# Patient Record
Sex: MALE | Race: WHITE | NOT HISPANIC OR LATINO | Employment: OTHER | URBAN - METROPOLITAN AREA
[De-identification: names, ages, dates, MRNs, and addresses within clinical notes are randomized per-mention and may not be internally consistent; named-entity substitution may affect disease eponyms.]

---

## 2021-10-28 ENCOUNTER — OFFICE VISIT (OUTPATIENT)
Dept: DERMATOLOGY | Facility: CLINIC | Age: 66
End: 2021-10-28
Payer: MEDICARE

## 2021-10-28 DIAGNOSIS — L82.1 SEBORRHEIC KERATOSIS: ICD-10-CM

## 2021-10-28 DIAGNOSIS — L81.4 LENTIGO: ICD-10-CM

## 2021-10-28 DIAGNOSIS — D22.9 MULTIPLE BENIGN NEVI: Primary | ICD-10-CM

## 2021-10-28 PROCEDURE — 1101F PT FALLS ASSESS-DOCD LE1/YR: CPT | Mod: CPTII,S$GLB,, | Performed by: DERMATOLOGY

## 2021-10-28 PROCEDURE — 1159F MED LIST DOCD IN RCRD: CPT | Mod: CPTII,S$GLB,, | Performed by: DERMATOLOGY

## 2021-10-28 PROCEDURE — 3288F FALL RISK ASSESSMENT DOCD: CPT | Mod: CPTII,S$GLB,, | Performed by: DERMATOLOGY

## 2021-10-28 PROCEDURE — 4010F PR ACE/ARB THEARPY RXD/TAKEN: ICD-10-PCS | Mod: CPTII,S$GLB,, | Performed by: DERMATOLOGY

## 2021-10-28 PROCEDURE — 4010F ACE/ARB THERAPY RXD/TAKEN: CPT | Mod: CPTII,S$GLB,, | Performed by: DERMATOLOGY

## 2021-10-28 PROCEDURE — 1160F PR REVIEW ALL MEDS BY PRESCRIBER/CLIN PHARMACIST DOCUMENTED: ICD-10-PCS | Mod: CPTII,S$GLB,, | Performed by: DERMATOLOGY

## 2021-10-28 PROCEDURE — 1160F RVW MEDS BY RX/DR IN RCRD: CPT | Mod: CPTII,S$GLB,, | Performed by: DERMATOLOGY

## 2021-10-28 PROCEDURE — 99203 PR OFFICE/OUTPT VISIT, NEW, LEVL III, 30-44 MIN: ICD-10-PCS | Mod: S$GLB,,, | Performed by: DERMATOLOGY

## 2021-10-28 PROCEDURE — 1101F PR PT FALLS ASSESS DOC 0-1 FALLS W/OUT INJ PAST YR: ICD-10-PCS | Mod: CPTII,S$GLB,, | Performed by: DERMATOLOGY

## 2021-10-28 PROCEDURE — 99999 PR PBB SHADOW E&M-EST. PATIENT-LVL III: CPT | Mod: PBBFAC,,, | Performed by: DERMATOLOGY

## 2021-10-28 PROCEDURE — 99999 PR PBB SHADOW E&M-EST. PATIENT-LVL III: ICD-10-PCS | Mod: PBBFAC,,, | Performed by: DERMATOLOGY

## 2021-10-28 PROCEDURE — 1159F PR MEDICATION LIST DOCUMENTED IN MEDICAL RECORD: ICD-10-PCS | Mod: CPTII,S$GLB,, | Performed by: DERMATOLOGY

## 2021-10-28 PROCEDURE — 3288F PR FALLS RISK ASSESSMENT DOCUMENTED: ICD-10-PCS | Mod: CPTII,S$GLB,, | Performed by: DERMATOLOGY

## 2021-10-28 PROCEDURE — 99203 OFFICE O/P NEW LOW 30 MIN: CPT | Mod: S$GLB,,, | Performed by: DERMATOLOGY

## 2021-10-28 RX ORDER — ISOSORBIDE MONONITRATE 30 MG/1
30 TABLET, EXTENDED RELEASE ORAL DAILY
Status: ON HOLD | COMMUNITY
Start: 2021-07-28 | End: 2023-11-07 | Stop reason: HOSPADM

## 2021-10-28 RX ORDER — TESTOSTERONE CYPIONATE 200 MG/ML
200 INJECTION, SOLUTION INTRAMUSCULAR
COMMUNITY
Start: 2021-10-07

## 2021-10-28 RX ORDER — ROSUVASTATIN CALCIUM 20 MG/1
20 TABLET, COATED ORAL DAILY
Status: ON HOLD | COMMUNITY
Start: 2021-10-07 | End: 2023-11-07 | Stop reason: HOSPADM

## 2021-10-28 RX ORDER — METOPROLOL SUCCINATE 25 MG/1
25 TABLET, EXTENDED RELEASE ORAL DAILY
COMMUNITY
Start: 2021-09-22

## 2021-10-28 RX ORDER — LISINOPRIL 40 MG/1
40 TABLET ORAL DAILY
Status: ON HOLD | COMMUNITY
Start: 2021-08-17 | End: 2023-11-07 | Stop reason: HOSPADM

## 2021-10-28 RX ORDER — HYDROCHLOROTHIAZIDE 25 MG/1
25 TABLET ORAL DAILY
Status: ON HOLD | COMMUNITY
Start: 2021-09-07 | End: 2023-11-07 | Stop reason: HOSPADM

## 2021-10-28 RX ORDER — IBUPROFEN 800 MG/1
800 TABLET ORAL 3 TIMES DAILY PRN
COMMUNITY
Start: 2021-06-15

## 2021-10-28 RX ORDER — OXYCODONE AND ACETAMINOPHEN 10; 325 MG/1; MG/1
1 TABLET ORAL EVERY 6 HOURS PRN
COMMUNITY
Start: 2021-10-04

## 2022-06-03 ENCOUNTER — OFFICE VISIT (OUTPATIENT)
Dept: UROLOGY | Facility: CLINIC | Age: 67
End: 2022-06-03
Payer: MEDICARE

## 2022-06-03 VITALS
WEIGHT: 220.88 LBS | TEMPERATURE: 99 F | DIASTOLIC BLOOD PRESSURE: 79 MMHG | RESPIRATION RATE: 18 BRPM | HEIGHT: 75 IN | BODY MASS INDEX: 27.46 KG/M2 | HEART RATE: 66 BPM | SYSTOLIC BLOOD PRESSURE: 152 MMHG

## 2022-06-03 DIAGNOSIS — R35.1 BENIGN PROSTATIC HYPERPLASIA WITH NOCTURIA: Primary | ICD-10-CM

## 2022-06-03 DIAGNOSIS — N40.1 BENIGN PROSTATIC HYPERPLASIA WITH NOCTURIA: Primary | ICD-10-CM

## 2022-06-03 DIAGNOSIS — Z80.42 FAMILY HISTORY OF PROSTATE CANCER IN FATHER: ICD-10-CM

## 2022-06-03 PROCEDURE — 3077F SYST BP >= 140 MM HG: CPT | Mod: CPTII,S$GLB,, | Performed by: UROLOGY

## 2022-06-03 PROCEDURE — 3078F PR MOST RECENT DIASTOLIC BLOOD PRESSURE < 80 MM HG: ICD-10-PCS | Mod: CPTII,S$GLB,, | Performed by: UROLOGY

## 2022-06-03 PROCEDURE — 1159F PR MEDICATION LIST DOCUMENTED IN MEDICAL RECORD: ICD-10-PCS | Mod: CPTII,S$GLB,, | Performed by: UROLOGY

## 2022-06-03 PROCEDURE — 99999 PR PBB SHADOW E&M-EST. PATIENT-LVL IV: ICD-10-PCS | Mod: PBBFAC,,, | Performed by: UROLOGY

## 2022-06-03 PROCEDURE — 1101F PR PT FALLS ASSESS DOC 0-1 FALLS W/OUT INJ PAST YR: ICD-10-PCS | Mod: CPTII,S$GLB,, | Performed by: UROLOGY

## 2022-06-03 PROCEDURE — 1126F AMNT PAIN NOTED NONE PRSNT: CPT | Mod: CPTII,S$GLB,, | Performed by: UROLOGY

## 2022-06-03 PROCEDURE — 3288F FALL RISK ASSESSMENT DOCD: CPT | Mod: CPTII,S$GLB,, | Performed by: UROLOGY

## 2022-06-03 PROCEDURE — 99999 PR PBB SHADOW E&M-EST. PATIENT-LVL IV: CPT | Mod: PBBFAC,,, | Performed by: UROLOGY

## 2022-06-03 PROCEDURE — 99203 OFFICE O/P NEW LOW 30 MIN: CPT | Mod: S$GLB,,, | Performed by: UROLOGY

## 2022-06-03 PROCEDURE — 4010F PR ACE/ARB THEARPY RXD/TAKEN: ICD-10-PCS | Mod: CPTII,S$GLB,, | Performed by: UROLOGY

## 2022-06-03 PROCEDURE — 3008F PR BODY MASS INDEX (BMI) DOCUMENTED: ICD-10-PCS | Mod: CPTII,S$GLB,, | Performed by: UROLOGY

## 2022-06-03 PROCEDURE — 4010F ACE/ARB THERAPY RXD/TAKEN: CPT | Mod: CPTII,S$GLB,, | Performed by: UROLOGY

## 2022-06-03 PROCEDURE — 1101F PT FALLS ASSESS-DOCD LE1/YR: CPT | Mod: CPTII,S$GLB,, | Performed by: UROLOGY

## 2022-06-03 PROCEDURE — 1159F MED LIST DOCD IN RCRD: CPT | Mod: CPTII,S$GLB,, | Performed by: UROLOGY

## 2022-06-03 PROCEDURE — 3008F BODY MASS INDEX DOCD: CPT | Mod: CPTII,S$GLB,, | Performed by: UROLOGY

## 2022-06-03 PROCEDURE — 3078F DIAST BP <80 MM HG: CPT | Mod: CPTII,S$GLB,, | Performed by: UROLOGY

## 2022-06-03 PROCEDURE — 3288F PR FALLS RISK ASSESSMENT DOCUMENTED: ICD-10-PCS | Mod: CPTII,S$GLB,, | Performed by: UROLOGY

## 2022-06-03 PROCEDURE — 99203 PR OFFICE/OUTPT VISIT, NEW, LEVL III, 30-44 MIN: ICD-10-PCS | Mod: S$GLB,,, | Performed by: UROLOGY

## 2022-06-03 PROCEDURE — 1126F PR PAIN SEVERITY QUANTIFIED, NO PAIN PRESENT: ICD-10-PCS | Mod: CPTII,S$GLB,, | Performed by: UROLOGY

## 2022-06-03 PROCEDURE — 3077F PR MOST RECENT SYSTOLIC BLOOD PRESSURE >= 140 MM HG: ICD-10-PCS | Mod: CPTII,S$GLB,, | Performed by: UROLOGY

## 2022-06-03 NOTE — PROGRESS NOTES
Chief Complaint:   Encounter Diagnoses   Name Primary?    Benign prostatic hyperplasia with nocturia Yes    Family history of prostate cancer in father        HPI:  67-year-old gentleman who would like to establish with Urology.  Significant family history of prostate cancer in his father who  in his mid 60s.  Thus far his PSAs have been stable, he takes 200 mg of testosterone every week by his PCP.  He states that over the last few months he started getting up about 3-5 times per evening with worsening symptoms, started over-the-counter prostate medication states that things are much better.  He is only getting up 2-3 times per evening, a better stream.  No problems with frequency urgency now, no gross hematuria, he has a remote history of smoking, but only for about 10 years.  No other urological history, no family history of urological cancers or stones except for that stated above.  Upon further questioning patient states that he did pass his stone years ago, no recurrence.  Tried tamsulosin once before with no benefit, at this point he is okay with his current lower urinary tract symptoms.    Allergies:  Patient has no known allergies.    Medications:  has a current medication list which includes the following prescription(s): hydrochlorothiazide, ibuprofen, isosorbide mononitrate, lisinopril, metoprolol succinate, oxycodone-acetaminophen, rosuvastatin, and testosterone cypionate.    Review of Systems:  General: No fever, chills, fatigability, or weight loss.  Skin: No rashes, itching, or changes in color or texture of skin.  Chest: Denies FERNANDEZ, cyanosis, wheezing, cough, and sputum production.  Abdomen: Appetite fine. No weight loss. Denies diarrhea, abdominal pain, hematemesis, or blood in stool.  Musculoskeletal: No joint stiffness or swelling. Denies back pain.  : As above.  All other review of systems negative.    PMH:   has a past medical history of Kidney stone.    PSH:   has no past surgical  history on file.    FamHx: family history includes Prostate cancer in his father.    SocHx:  reports that he has never smoked. He has never used smokeless tobacco. He reports that he does not drink alcohol and does not use drugs.      Physical Exam:  Vitals:    06/03/22 1457   BP: (!) 152/79   Pulse: 66   Resp: 18   Temp: 99.1 °F (37.3 °C)     General: A&Ox3, no apparent distress, no deformities  Neck: No masses, normal ROM  Lungs: normal inspiration, no use of accessory muscles  Heart: normal pulse, no arrhythmias  Abdomen: Soft, NT, ND, no masses, no hernias, no hepatosplenomegaly  Skin: The skin is warm and dry. No jaundice.  Ext: No c/c/e.  ESTRELLITA: 6/22- Normal rectal tone. Prost 25 gm no nodules or masses appreciated. SV not palpable. Perineum and anus normal.    Labs/Studies:   PSA 0.69 3/22    Impression/Plan:     1. BPH- currently doing well with over-the-counter supplements and would continue, call if this becomes worse.  Significant family history of prostate cancer within his father, ESTRELLITA and PSA are stable.  I will see him in 1 year with a PSA, unless he needs to be seen sooner.    2. Family history of prostate cancer within his father- please see above.

## 2022-09-14 ENCOUNTER — PATIENT MESSAGE (OUTPATIENT)
Dept: UROLOGY | Facility: CLINIC | Age: 67
End: 2022-09-14
Payer: MEDICARE

## 2022-11-11 ENCOUNTER — PATIENT MESSAGE (OUTPATIENT)
Dept: UROLOGY | Facility: CLINIC | Age: 67
End: 2022-11-11
Payer: MEDICARE

## 2022-12-16 ENCOUNTER — PATIENT MESSAGE (OUTPATIENT)
Dept: RESEARCH | Facility: HOSPITAL | Age: 67
End: 2022-12-16
Payer: MEDICARE

## 2023-05-05 ENCOUNTER — TELEPHONE (OUTPATIENT)
Dept: SURGERY | Facility: CLINIC | Age: 68
End: 2023-05-05
Payer: MEDICARE

## 2023-05-05 NOTE — TELEPHONE ENCOUNTER
Lm on vm letting know pt know we do not have a referral and his ins company might need that. Asked pt to call and gave good call back number

## 2023-06-02 ENCOUNTER — OFFICE VISIT (OUTPATIENT)
Dept: UROLOGY | Facility: CLINIC | Age: 68
End: 2023-06-02
Payer: MEDICARE

## 2023-06-02 VITALS
DIASTOLIC BLOOD PRESSURE: 73 MMHG | SYSTOLIC BLOOD PRESSURE: 125 MMHG | HEART RATE: 74 BPM | BODY MASS INDEX: 26.13 KG/M2 | HEIGHT: 75 IN | WEIGHT: 210.13 LBS

## 2023-06-02 DIAGNOSIS — Z80.42 FAMILY HISTORY OF PROSTATE CANCER IN FATHER: ICD-10-CM

## 2023-06-02 DIAGNOSIS — R35.1 BENIGN PROSTATIC HYPERPLASIA WITH NOCTURIA: Primary | ICD-10-CM

## 2023-06-02 DIAGNOSIS — N40.1 BENIGN PROSTATIC HYPERPLASIA WITH NOCTURIA: Primary | ICD-10-CM

## 2023-06-02 PROCEDURE — 3078F DIAST BP <80 MM HG: CPT | Mod: CPTII,S$GLB,, | Performed by: UROLOGY

## 2023-06-02 PROCEDURE — 3074F PR MOST RECENT SYSTOLIC BLOOD PRESSURE < 130 MM HG: ICD-10-PCS | Mod: CPTII,S$GLB,, | Performed by: UROLOGY

## 2023-06-02 PROCEDURE — 1126F AMNT PAIN NOTED NONE PRSNT: CPT | Mod: CPTII,S$GLB,, | Performed by: UROLOGY

## 2023-06-02 PROCEDURE — 99999 PR PBB SHADOW E&M-EST. PATIENT-LVL III: CPT | Mod: PBBFAC,,, | Performed by: UROLOGY

## 2023-06-02 PROCEDURE — 3074F SYST BP LT 130 MM HG: CPT | Mod: CPTII,S$GLB,, | Performed by: UROLOGY

## 2023-06-02 PROCEDURE — 99213 OFFICE O/P EST LOW 20 MIN: CPT | Mod: S$GLB,,, | Performed by: UROLOGY

## 2023-06-02 PROCEDURE — 3008F PR BODY MASS INDEX (BMI) DOCUMENTED: ICD-10-PCS | Mod: CPTII,S$GLB,, | Performed by: UROLOGY

## 2023-06-02 PROCEDURE — 1159F MED LIST DOCD IN RCRD: CPT | Mod: CPTII,S$GLB,, | Performed by: UROLOGY

## 2023-06-02 PROCEDURE — 1126F PR PAIN SEVERITY QUANTIFIED, NO PAIN PRESENT: ICD-10-PCS | Mod: CPTII,S$GLB,, | Performed by: UROLOGY

## 2023-06-02 PROCEDURE — 4010F ACE/ARB THERAPY RXD/TAKEN: CPT | Mod: CPTII,S$GLB,, | Performed by: UROLOGY

## 2023-06-02 PROCEDURE — 1101F PT FALLS ASSESS-DOCD LE1/YR: CPT | Mod: CPTII,S$GLB,, | Performed by: UROLOGY

## 2023-06-02 PROCEDURE — 1101F PR PT FALLS ASSESS DOC 0-1 FALLS W/OUT INJ PAST YR: ICD-10-PCS | Mod: CPTII,S$GLB,, | Performed by: UROLOGY

## 2023-06-02 PROCEDURE — 3288F FALL RISK ASSESSMENT DOCD: CPT | Mod: CPTII,S$GLB,, | Performed by: UROLOGY

## 2023-06-02 PROCEDURE — 99999 PR PBB SHADOW E&M-EST. PATIENT-LVL III: ICD-10-PCS | Mod: PBBFAC,,, | Performed by: UROLOGY

## 2023-06-02 PROCEDURE — 3008F BODY MASS INDEX DOCD: CPT | Mod: CPTII,S$GLB,, | Performed by: UROLOGY

## 2023-06-02 PROCEDURE — 99213 PR OFFICE/OUTPT VISIT, EST, LEVL III, 20-29 MIN: ICD-10-PCS | Mod: S$GLB,,, | Performed by: UROLOGY

## 2023-06-02 PROCEDURE — 1159F PR MEDICATION LIST DOCUMENTED IN MEDICAL RECORD: ICD-10-PCS | Mod: CPTII,S$GLB,, | Performed by: UROLOGY

## 2023-06-02 PROCEDURE — 3078F PR MOST RECENT DIASTOLIC BLOOD PRESSURE < 80 MM HG: ICD-10-PCS | Mod: CPTII,S$GLB,, | Performed by: UROLOGY

## 2023-06-02 PROCEDURE — 4010F PR ACE/ARB THEARPY RXD/TAKEN: ICD-10-PCS | Mod: CPTII,S$GLB,, | Performed by: UROLOGY

## 2023-06-02 PROCEDURE — 3288F PR FALLS RISK ASSESSMENT DOCUMENTED: ICD-10-PCS | Mod: CPTII,S$GLB,, | Performed by: UROLOGY

## 2023-06-02 NOTE — PROGRESS NOTES
Chief Complaint:   Encounter Diagnoses   Name Primary?    Benign prostatic hyperplasia with nocturia Yes    Family history of prostate cancer in father        HPI:    23- here for yearly assessment, PSA is stable and voiding well on over-the-counter medications.    67-year-old gentleman who would like to establish with Urology.  Significant family history of prostate cancer in his father who  in his mid 60s.  Thus far his PSAs have been stable, he takes 200 mg of testosterone every week by his PCP.  He states that over the last few months he started getting up about 3-5 times per evening with worsening symptoms, started over-the-counter prostate medication states that things are much better.  He is only getting up 2-3 times per evening, a better stream.  No problems with frequency urgency now, no gross hematuria, he has a remote history of smoking, but only for about 10 years.  No other urological history, no family history of urological cancers or stones except for that stated above.  Upon further questioning patient states that he did pass his stone years ago, no recurrence.  Tried tamsulosin once before with no benefit, at this point he is okay with his current lower urinary tract symptoms.    Allergies:  Patient has no known allergies.    Medications:  has a current medication list which includes the following prescription(s): hydrochlorothiazide, ibuprofen, isosorbide mononitrate, lisinopril, metoprolol succinate, oxycodone-acetaminophen, rosuvastatin, and testosterone cypionate.    Review of Systems:  General: No fever, chills, fatigability, or weight loss.  Skin: No rashes, itching, or changes in color or texture of skin.  Chest: Denies FERNANDEZ, cyanosis, wheezing, cough, and sputum production.  Abdomen: Appetite fine. No weight loss. Denies diarrhea, abdominal pain, hematemesis, or blood in stool.  Musculoskeletal: No joint stiffness or swelling. Denies back pain.  : As above.  All other review of  systems negative.    PMH:   has a past medical history of Kidney stone.    PSH:   has no past surgical history on file.    FamHx: family history includes Prostate cancer in his father.    SocHx:  reports that he has never smoked. He has never used smokeless tobacco. He reports that he does not drink alcohol and does not use drugs.      Physical Exam:  There were no vitals filed for this visit.    General: A&Ox3, no apparent distress, no deformities  Neck: No masses, normal ROM  Lungs: normal inspiration, no use of accessory muscles  Heart: normal pulse, no arrhythmias  Abdomen: Soft, NT, ND, no masses, no hernias, no hepatosplenomegaly  Skin: The skin is warm and dry. No jaundice.  Ext: No c/c/e.  ESTRELLITA: 6/22- Normal rectal tone. Prost 25 gm no nodules or masses appreciated. SV not palpable. Perineum and anus normal.    Labs/Studies:   PSA 0.74 4/23    Impression/Plan:       1. BPH- currently doing well with over-the-counter medications, no further recommendations.    2. Family history of prostate cancer within his father- PSA appears to be stable, continue yearly.  Call with any complaints in the meantime.

## 2023-10-28 ENCOUNTER — HOSPITAL ENCOUNTER (INPATIENT)
Facility: HOSPITAL | Age: 68
LOS: 10 days | Discharge: HOME HEALTH | DRG: 215 | End: 2023-11-07
Attending: EMERGENCY MEDICINE | Admitting: INTERNAL MEDICINE
Payer: MEDICARE

## 2023-10-28 DIAGNOSIS — Z95.811 LVAD (LEFT VENTRICULAR ASSIST DEVICE) PRESENT: ICD-10-CM

## 2023-10-28 DIAGNOSIS — Z95.1 S/P CABG X 3: Primary | ICD-10-CM

## 2023-10-28 DIAGNOSIS — R07.9 CHEST PAIN AT REST: ICD-10-CM

## 2023-10-28 DIAGNOSIS — I49.01 VENTRICULAR FIBRILLATION: ICD-10-CM

## 2023-10-28 DIAGNOSIS — I21.3 STEMI (ST ELEVATION MYOCARDIAL INFARCTION): ICD-10-CM

## 2023-10-28 DIAGNOSIS — I46.9 CARDIAC ARREST: ICD-10-CM

## 2023-10-28 DIAGNOSIS — Z98.890 POST-OPERATIVE STATE: ICD-10-CM

## 2023-10-28 DIAGNOSIS — I25.110 CORONARY ARTERY DISEASE INVOLVING NATIVE CORONARY ARTERY OF NATIVE HEART WITH UNSTABLE ANGINA PECTORIS: ICD-10-CM

## 2023-10-28 DIAGNOSIS — I25.10 CAD (CORONARY ARTERY DISEASE): ICD-10-CM

## 2023-10-28 DIAGNOSIS — I47.20 VT (VENTRICULAR TACHYCARDIA): ICD-10-CM

## 2023-10-28 DIAGNOSIS — I25.10 LEFT MAIN CORONARY ARTERY DISEASE: ICD-10-CM

## 2023-10-28 PROBLEM — J96.02 ACUTE HYPERCAPNIC RESPIRATORY FAILURE: Status: ACTIVE | Noted: 2023-10-28

## 2023-10-28 PROBLEM — E78.5 HYPERLIPIDEMIA: Status: ACTIVE | Noted: 2023-10-28

## 2023-10-28 PROBLEM — I10 PRIMARY HYPERTENSION: Status: ACTIVE | Noted: 2023-10-28

## 2023-10-28 PROBLEM — N17.9 AKI (ACUTE KIDNEY INJURY): Status: ACTIVE | Noted: 2023-10-28

## 2023-10-28 PROBLEM — E29.1 HYPOGONADISM IN MALE: Status: ACTIVE | Noted: 2023-10-28

## 2023-10-28 PROBLEM — K51.20 ULCERATIVE PROCTITIS: Status: ACTIVE | Noted: 2023-10-28

## 2023-10-28 LAB
ALBUMIN SERPL BCP-MCNC: 4 G/DL (ref 3.5–5.2)
ALLENS TEST: ABNORMAL
ALLENS TEST: ABNORMAL
ALP SERPL-CCNC: 56 U/L (ref 55–135)
ALT SERPL W/O P-5'-P-CCNC: 215 U/L (ref 10–44)
ANION GAP SERPL CALC-SCNC: 12 MMOL/L (ref 8–16)
ANION GAP SERPL CALC-SCNC: 27 MMOL/L (ref 8–16)
AORTIC ROOT ANNULUS: 3.05 CM
APTT PPP: 23 SEC (ref 21–32)
APTT PPP: 24.5 SEC (ref 21–32)
ASCENDING AORTA: 2.86 CM
AST SERPL-CCNC: 113 U/L (ref 10–40)
AV INDEX (PROSTH): 0.79
AV MEAN GRADIENT: 3 MMHG
AV PEAK GRADIENT: 4 MMHG
AV VALVE AREA BY VELOCITY RATIO: 2.15 CM²
AV VALVE AREA: 2.34 CM²
AV VELOCITY RATIO: 0.73
BACTERIA #/AREA URNS HPF: ABNORMAL /HPF
BASOPHILS # BLD AUTO: 0.1 K/UL (ref 0–0.2)
BASOPHILS NFR BLD: 0.8 % (ref 0–1.9)
BILIRUB SERPL-MCNC: 0.5 MG/DL (ref 0.1–1)
BILIRUB UR QL STRIP: NEGATIVE
BSA FOR ECHO PROCEDURE: 2.43 M2
BUN SERPL-MCNC: 27 MG/DL (ref 8–23)
BUN SERPL-MCNC: 30 MG/DL (ref 8–23)
CALCIUM SERPL-MCNC: 7.5 MG/DL (ref 8.7–10.5)
CALCIUM SERPL-MCNC: 8.3 MG/DL (ref 8.7–10.5)
CHLORIDE SERPL-SCNC: 102 MMOL/L (ref 95–110)
CHLORIDE SERPL-SCNC: 105 MMOL/L (ref 95–110)
CK SERPL-CCNC: 2422 U/L (ref 20–200)
CK SERPL-CCNC: 394 U/L (ref 20–200)
CLARITY UR: CLEAR
CO2 SERPL-SCNC: 11 MMOL/L (ref 23–29)
CO2 SERPL-SCNC: 21 MMOL/L (ref 23–29)
COLOR UR: YELLOW
CREAT SERPL-MCNC: 1.5 MG/DL (ref 0.5–1.4)
CREAT SERPL-MCNC: 1.9 MG/DL (ref 0.5–1.4)
CV ECHO LV RWT: 0.42 CM
DELSYS: ABNORMAL
DELSYS: ABNORMAL
DIFFERENTIAL METHOD: ABNORMAL
DOP CALC AO PEAK VEL: 1.06 M/S
DOP CALC AO VTI: 20.6 CM
DOP CALC LVOT AREA: 3 CM2
DOP CALC LVOT DIAMETER: 1.94 CM
DOP CALC LVOT PEAK VEL: 0.77 M/S
DOP CALC LVOT STROKE VOLUME: 48.16 CM3
DOP CALC RVOT PEAK VEL: 0.66 M/S
DOP CALC RVOT VTI: 13.7 CM
DOP CALCLVOT PEAK VEL VTI: 16.3 CM
E WAVE DECELERATION TIME: 189.74 MSEC
E/A RATIO: 2.05
E/E' RATIO: 8.8 M/S
ECHO LV POSTERIOR WALL: 1.13 CM (ref 0.6–1.1)
EOSINOPHIL # BLD AUTO: 0.2 K/UL (ref 0–0.5)
EOSINOPHIL NFR BLD: 1.3 % (ref 0–8)
ERYTHROCYTE [DISTWIDTH] IN BLOOD BY AUTOMATED COUNT: 13.3 % (ref 11.5–14.5)
ERYTHROCYTE [SEDIMENTATION RATE] IN BLOOD BY WESTERGREN METHOD: 18 MM/H
ERYTHROCYTE [SEDIMENTATION RATE] IN BLOOD BY WESTERGREN METHOD: 20 MM/H
EST. GFR  (NO RACE VARIABLE): 38 ML/MIN/1.73 M^2
EST. GFR  (NO RACE VARIABLE): 51 ML/MIN/1.73 M^2
FIBRINOGEN PPP-MCNC: 179 MG/DL (ref 182–400)
FIO2: 100
FIO2: 40
FRACTIONAL SHORTENING: 29 % (ref 28–44)
GLUCOSE SERPL-MCNC: 144 MG/DL (ref 70–110)
GLUCOSE SERPL-MCNC: 145 MG/DL (ref 70–110)
GLUCOSE SERPL-MCNC: 148 MG/DL (ref 70–110)
GLUCOSE SERPL-MCNC: 149 MG/DL (ref 70–110)
GLUCOSE SERPL-MCNC: 198 MG/DL (ref 70–110)
GLUCOSE UR QL STRIP: NEGATIVE
HCO3 UR-SCNC: 20 MMOL/L (ref 24–28)
HCO3 UR-SCNC: 21.7 MMOL/L (ref 24–28)
HCT VFR BLD AUTO: 53.3 % (ref 40–54)
HGB BLD-MCNC: 16.9 G/DL (ref 14–18)
HGB UR QL STRIP: ABNORMAL
IMM GRANULOCYTES # BLD AUTO: 0.7 K/UL (ref 0–0.04)
IMM GRANULOCYTES NFR BLD AUTO: 5.6 % (ref 0–0.5)
INR PPP: 1.3 (ref 0.8–1.2)
INTERVENTRICULAR SEPTUM: 1.09 CM (ref 0.6–1.1)
IVC DIAMETER: 2.68 CM
IVRT: 64.7 MSEC
KETONES UR QL STRIP: NEGATIVE
LA MAJOR: 6.14 CM
LA MINOR: 6.05 CM
LA WIDTH: 4.5 CM
LACTATE SERPL-SCNC: 1.8 MMOL/L (ref 0.5–2.2)
LEFT ATRIUM SIZE: 4.3 CM
LEFT ATRIUM VOLUME INDEX: 41.8 ML/M2
LEFT ATRIUM VOLUME: 100.24 CM3
LEFT INTERNAL DIMENSION IN SYSTOLE: 3.81 CM (ref 2.1–4)
LEFT VENTRICLE DIASTOLIC VOLUME INDEX: 57.63 ML/M2
LEFT VENTRICLE DIASTOLIC VOLUME: 138.32 ML
LEFT VENTRICLE MASS INDEX: 98 G/M2
LEFT VENTRICLE SYSTOLIC VOLUME INDEX: 26 ML/M2
LEFT VENTRICLE SYSTOLIC VOLUME: 62.38 ML
LEFT VENTRICULAR INTERNAL DIMENSION IN DIASTOLE: 5.35 CM (ref 3.5–6)
LEFT VENTRICULAR MASS: 234.12 G
LEUKOCYTE ESTERASE UR QL STRIP: NEGATIVE
LV LATERAL E/E' RATIO: 9.78 M/S
LV SEPTAL E/E' RATIO: 8 M/S
LVOT MG: 1.52 MMHG
LVOT MV: 0.6 CM/S
LYMPHOCYTES # BLD AUTO: 3.9 K/UL (ref 1–4.8)
LYMPHOCYTES NFR BLD: 31 % (ref 18–48)
MAGNESIUM SERPL-MCNC: 2 MG/DL (ref 1.6–2.6)
MCH RBC QN AUTO: 29.8 PG (ref 27–31)
MCHC RBC AUTO-ENTMCNC: 31.7 G/DL (ref 32–36)
MCV RBC AUTO: 94 FL (ref 82–98)
MICROSCOPIC COMMENT: ABNORMAL
MODE: ABNORMAL
MODE: ABNORMAL
MONOCYTES # BLD AUTO: 0.8 K/UL (ref 0.3–1)
MONOCYTES NFR BLD: 6.3 % (ref 4–15)
MV PEAK A VEL: 0.43 M/S
MV PEAK E VEL: 0.88 M/S
MV STENOSIS PRESSURE HALF TIME: 55.02 MS
MV VALVE AREA P 1/2 METHOD: 4 CM2
NEUTROPHILS # BLD AUTO: 6.9 K/UL (ref 1.8–7.7)
NEUTROPHILS NFR BLD: 55 % (ref 38–73)
NITRITE UR QL STRIP: NEGATIVE
NRBC BLD-RTO: 0 /100 WBC
PCO2 BLDA: 45.7 MMHG (ref 35–45)
PCO2 BLDA: 57.2 MMHG (ref 35–45)
PEEP: 5
PEEP: 5
PH SMN: 7.19 [PH] (ref 7.35–7.45)
PH SMN: 7.25 [PH] (ref 7.35–7.45)
PH UR STRIP: 7 [PH] (ref 5–8)
PHOSPHATE SERPL-MCNC: 3.7 MG/DL (ref 2.7–4.5)
PISA MRMAX VEL: 4.82 M/S
PISA TR MAX VEL: 2.89 M/S
PLATELET # BLD AUTO: 250 K/UL (ref 150–450)
PMV BLD AUTO: 10.3 FL (ref 9.2–12.9)
PO2 BLDA: 413 MMHG (ref 80–100)
PO2 BLDA: 77 MMHG (ref 80–100)
POC BE: -6 MMOL/L
POC BE: -7 MMOL/L
POC SATURATED O2: 100 % (ref 95–100)
POC SATURATED O2: 91 % (ref 95–100)
POTASSIUM SERPL-SCNC: 4.1 MMOL/L (ref 3.5–5.1)
POTASSIUM SERPL-SCNC: 5.1 MMOL/L (ref 3.5–5.1)
PROT SERPL-MCNC: 6.8 G/DL (ref 6–8.4)
PROT UR QL STRIP: ABNORMAL
PROTHROMBIN TIME: 14 SEC (ref 9–12.5)
PV MEAN GRADIENT: 1 MMHG
RA MAJOR: 5.53 CM
RA PRESSURE ESTIMATED: 3 MMHG
RA WIDTH: 4 CM
RBC # BLD AUTO: 5.68 M/UL (ref 4.6–6.2)
RBC #/AREA URNS HPF: 6 /HPF (ref 0–4)
RIGHT VENTRICULAR END-DIASTOLIC DIMENSION: 2.69 CM
RV TB RVSP: 6 MMHG
SAMPLE: ABNORMAL
SAMPLE: ABNORMAL
SITE: ABNORMAL
SITE: ABNORMAL
SODIUM SERPL-SCNC: 138 MMOL/L (ref 136–145)
SODIUM SERPL-SCNC: 140 MMOL/L (ref 136–145)
SP GR UR STRIP: 1.02 (ref 1–1.03)
SP02: 100
STJ: 2.96 CM
TDI LATERAL: 0.09 M/S
TDI SEPTAL: 0.11 M/S
TDI: 0.1 M/S
TR MAX PG: 33 MMHG
TR MEAN GRADIENT: 29 MMHG
TRICUSPID ANNULAR PLANE SYSTOLIC EXCURSION: 2.09 CM
TROPONIN I SERPL DL<=0.01 NG/ML-MCNC: 0.02 NG/ML (ref 0–0.03)
TROPONIN I SERPL DL<=0.01 NG/ML-MCNC: 2.21 NG/ML (ref 0–0.03)
TROPONIN I SERPL DL<=0.01 NG/ML-MCNC: 6.19 NG/ML (ref 0–0.03)
TV REST PULMONARY ARTERY PRESSURE: 36 MMHG
UNIDENT CRYS URNS QL MICRO: ABNORMAL
URN SPEC COLLECT METH UR: ABNORMAL
UROBILINOGEN UR STRIP-ACNC: NEGATIVE EU/DL
VT: 450
VT: 500
WBC # BLD AUTO: 12.6 K/UL (ref 3.9–12.7)
WBC #/AREA URNS HPF: 3 /HPF (ref 0–5)
Z-SCORE OF LEFT VENTRICULAR DIMENSION IN END DIASTOLE: -7.15
Z-SCORE OF LEFT VENTRICULAR DIMENSION IN END SYSTOLE: -4.28

## 2023-10-28 PROCEDURE — 63600175 PHARM REV CODE 636 W HCPCS

## 2023-10-28 PROCEDURE — 63600175 PHARM REV CODE 636 W HCPCS: Performed by: NURSE PRACTITIONER

## 2023-10-28 PROCEDURE — 87040 BLOOD CULTURE FOR BACTERIA: CPT | Performed by: EMERGENCY MEDICINE

## 2023-10-28 PROCEDURE — 82803 BLOOD GASES ANY COMBINATION: CPT

## 2023-10-28 PROCEDURE — 99900026 HC AIRWAY MAINTENANCE (STAT)

## 2023-10-28 PROCEDURE — 96361 HYDRATE IV INFUSION ADD-ON: CPT

## 2023-10-28 PROCEDURE — 36415 COLL VENOUS BLD VENIPUNCTURE: CPT | Performed by: INTERNAL MEDICINE

## 2023-10-28 PROCEDURE — 99291 CRITICAL CARE FIRST HOUR: CPT

## 2023-10-28 PROCEDURE — 84484 ASSAY OF TROPONIN QUANT: CPT | Mod: 91 | Performed by: INTERNAL MEDICINE

## 2023-10-28 PROCEDURE — 93005 ELECTROCARDIOGRAM TRACING: CPT

## 2023-10-28 PROCEDURE — 25000003 PHARM REV CODE 250: Performed by: INTERNAL MEDICINE

## 2023-10-28 PROCEDURE — 94002 VENT MGMT INPAT INIT DAY: CPT

## 2023-10-28 PROCEDURE — 82550 ASSAY OF CK (CPK): CPT | Mod: 91 | Performed by: EMERGENCY MEDICINE

## 2023-10-28 PROCEDURE — 85384 FIBRINOGEN ACTIVITY: CPT | Performed by: EMERGENCY MEDICINE

## 2023-10-28 PROCEDURE — 82550 ASSAY OF CK (CPK): CPT | Performed by: INTERNAL MEDICINE

## 2023-10-28 PROCEDURE — 27201640 HC PAD, ARTICGEL

## 2023-10-28 PROCEDURE — 85610 PROTHROMBIN TIME: CPT | Performed by: EMERGENCY MEDICINE

## 2023-10-28 PROCEDURE — 20000000 HC ICU ROOM

## 2023-10-28 PROCEDURE — 95822 EEG COMA OR SLEEP ONLY: CPT

## 2023-10-28 PROCEDURE — 99900035 HC TECH TIME PER 15 MIN (STAT)

## 2023-10-28 PROCEDURE — 99223 1ST HOSP IP/OBS HIGH 75: CPT | Mod: 25,,, | Performed by: INTERNAL MEDICINE

## 2023-10-28 PROCEDURE — 99223 PR INITIAL HOSPITAL CARE,LEVL III: ICD-10-PCS | Mod: 25,,, | Performed by: INTERNAL MEDICINE

## 2023-10-28 PROCEDURE — 93010 ELECTROCARDIOGRAM REPORT: CPT | Mod: ,,, | Performed by: INTERNAL MEDICINE

## 2023-10-28 PROCEDURE — 63600175 PHARM REV CODE 636 W HCPCS: Performed by: EMERGENCY MEDICINE

## 2023-10-28 PROCEDURE — 80053 COMPREHEN METABOLIC PANEL: CPT | Performed by: EMERGENCY MEDICINE

## 2023-10-28 PROCEDURE — 83605 ASSAY OF LACTIC ACID: CPT | Performed by: INTERNAL MEDICINE

## 2023-10-28 PROCEDURE — 85730 THROMBOPLASTIN TIME PARTIAL: CPT | Mod: 91 | Performed by: EMERGENCY MEDICINE

## 2023-10-28 PROCEDURE — 81000 URINALYSIS NONAUTO W/SCOPE: CPT | Performed by: EMERGENCY MEDICINE

## 2023-10-28 PROCEDURE — 51702 INSERT TEMP BLADDER CATH: CPT

## 2023-10-28 PROCEDURE — 83735 ASSAY OF MAGNESIUM: CPT | Mod: 91 | Performed by: INTERNAL MEDICINE

## 2023-10-28 PROCEDURE — 96374 THER/PROPH/DIAG INJ IV PUSH: CPT

## 2023-10-28 PROCEDURE — 25000003 PHARM REV CODE 250: Performed by: EMERGENCY MEDICINE

## 2023-10-28 PROCEDURE — 36556 INSERT NON-TUNNEL CV CATH: CPT

## 2023-10-28 PROCEDURE — 36620 INSERTION CATHETER ARTERY: CPT

## 2023-10-28 PROCEDURE — 96375 TX/PRO/DX INJ NEW DRUG ADDON: CPT

## 2023-10-28 PROCEDURE — 84484 ASSAY OF TROPONIN QUANT: CPT | Mod: 91 | Performed by: EMERGENCY MEDICINE

## 2023-10-28 PROCEDURE — 93010 ELECTROCARDIOGRAM REPORT: CPT | Mod: 76,,, | Performed by: INTERNAL MEDICINE

## 2023-10-28 PROCEDURE — 96365 THER/PROPH/DIAG IV INF INIT: CPT

## 2023-10-28 PROCEDURE — 94761 N-INVAS EAR/PLS OXIMETRY MLT: CPT

## 2023-10-28 PROCEDURE — 85730 THROMBOPLASTIN TIME PARTIAL: CPT | Performed by: INTERNAL MEDICINE

## 2023-10-28 PROCEDURE — 82947 ASSAY GLUCOSE BLOOD QUANT: CPT | Performed by: INTERNAL MEDICINE

## 2023-10-28 PROCEDURE — 93010 EKG 12-LEAD: ICD-10-PCS | Mod: 76,,, | Performed by: INTERNAL MEDICINE

## 2023-10-28 PROCEDURE — 80048 BASIC METABOLIC PNL TOTAL CA: CPT | Mod: XB | Performed by: INTERNAL MEDICINE

## 2023-10-28 PROCEDURE — 63600175 PHARM REV CODE 636 W HCPCS: Performed by: INTERNAL MEDICINE

## 2023-10-28 PROCEDURE — 84100 ASSAY OF PHOSPHORUS: CPT | Mod: 91 | Performed by: INTERNAL MEDICINE

## 2023-10-28 PROCEDURE — 95822 EEG COMA OR SLEEP ONLY: CPT | Mod: 26,,, | Performed by: PSYCHIATRY & NEUROLOGY

## 2023-10-28 PROCEDURE — 85007 BL SMEAR W/DIFF WBC COUNT: CPT | Performed by: EMERGENCY MEDICINE

## 2023-10-28 PROCEDURE — 85027 COMPLETE CBC AUTOMATED: CPT | Performed by: EMERGENCY MEDICINE

## 2023-10-28 PROCEDURE — 95822 PR EEG,COMA/SLEEP RECORD ONLY: ICD-10-PCS | Mod: 26,,, | Performed by: PSYCHIATRY & NEUROLOGY

## 2023-10-28 PROCEDURE — 27100171 HC OXYGEN HIGH FLOW UP TO 24 HOURS

## 2023-10-28 PROCEDURE — 36600 WITHDRAWAL OF ARTERIAL BLOOD: CPT

## 2023-10-28 RX ORDER — SODIUM CHLORIDE 0.9 % (FLUSH) 0.9 %
10 SYRINGE (ML) INJECTION
Status: DISCONTINUED | OUTPATIENT
Start: 2023-10-28 | End: 2023-11-07 | Stop reason: HOSPADM

## 2023-10-28 RX ORDER — NOREPINEPHRINE BITARTRATE/D5W 4MG/250ML
PLASTIC BAG, INJECTION (ML) INTRAVENOUS
Status: DISPENSED
Start: 2023-10-28 | End: 2023-10-28

## 2023-10-28 RX ORDER — MAGNESIUM SULFATE HEPTAHYDRATE 40 MG/ML
2 INJECTION, SOLUTION INTRAVENOUS
Status: DISCONTINUED | OUTPATIENT
Start: 2023-10-28 | End: 2023-11-07 | Stop reason: HOSPADM

## 2023-10-28 RX ORDER — BUSPIRONE HYDROCHLORIDE 5 MG/1
5 TABLET ORAL 2 TIMES DAILY
Status: DISCONTINUED | OUTPATIENT
Start: 2023-10-28 | End: 2023-10-30

## 2023-10-28 RX ORDER — CALCIUM GLUCONATE 20 MG/ML
3 INJECTION, SOLUTION INTRAVENOUS
Status: DISCONTINUED | OUTPATIENT
Start: 2023-10-28 | End: 2023-10-31

## 2023-10-28 RX ORDER — DOPAMINE HYDROCHLORIDE 160 MG/100ML
0-20 INJECTION, SOLUTION INTRAVENOUS CONTINUOUS
Status: DISCONTINUED | OUTPATIENT
Start: 2023-10-28 | End: 2023-10-30

## 2023-10-28 RX ORDER — PROPOFOL 10 MG/ML
INJECTION, EMULSION INTRAVENOUS
Status: COMPLETED
Start: 2023-10-28 | End: 2023-10-28

## 2023-10-28 RX ORDER — LEVETIRACETAM 500 MG/5ML
2000 INJECTION, SOLUTION, CONCENTRATE INTRAVENOUS ONCE
Status: DISCONTINUED | OUTPATIENT
Start: 2023-10-28 | End: 2023-10-28

## 2023-10-28 RX ORDER — NOREPINEPHRINE BITARTRATE/D5W 4MG/250ML
0-3 PLASTIC BAG, INJECTION (ML) INTRAVENOUS CONTINUOUS
Status: DISCONTINUED | OUTPATIENT
Start: 2023-10-28 | End: 2023-10-29

## 2023-10-28 RX ORDER — POTASSIUM CHLORIDE 29.8 MG/ML
80 INJECTION INTRAVENOUS
Status: DISCONTINUED | OUTPATIENT
Start: 2023-10-28 | End: 2023-11-02 | Stop reason: SDUPTHER

## 2023-10-28 RX ORDER — LORAZEPAM 2 MG/ML
INJECTION INTRAMUSCULAR
Status: COMPLETED
Start: 2023-10-28 | End: 2023-10-28

## 2023-10-28 RX ORDER — FAMOTIDINE 10 MG/ML
20 INJECTION INTRAVENOUS EVERY 12 HOURS
Status: DISCONTINUED | OUTPATIENT
Start: 2023-10-28 | End: 2023-10-30

## 2023-10-28 RX ORDER — ATROPINE SULFATE 0.1 MG/ML
INJECTION INTRAVENOUS
Status: DISPENSED
Start: 2023-10-28 | End: 2023-10-28

## 2023-10-28 RX ORDER — DOPAMINE HYDROCHLORIDE 160 MG/100ML
0-20 INJECTION, SOLUTION INTRAVENOUS CONTINUOUS
Status: DISCONTINUED | OUTPATIENT
Start: 2023-10-28 | End: 2023-10-28

## 2023-10-28 RX ORDER — FENTANYL CITRATE 50 UG/ML
200 INJECTION, SOLUTION INTRAMUSCULAR; INTRAVENOUS
Status: COMPLETED | OUTPATIENT
Start: 2023-10-28 | End: 2023-10-28

## 2023-10-28 RX ORDER — MAGNESIUM SULFATE HEPTAHYDRATE 40 MG/ML
4 INJECTION, SOLUTION INTRAVENOUS
Status: DISCONTINUED | OUTPATIENT
Start: 2023-10-28 | End: 2023-11-07 | Stop reason: HOSPADM

## 2023-10-28 RX ORDER — SODIUM CHLORIDE, SODIUM LACTATE, POTASSIUM CHLORIDE, CALCIUM CHLORIDE 600; 310; 30; 20 MG/100ML; MG/100ML; MG/100ML; MG/100ML
INJECTION, SOLUTION INTRAVENOUS CONTINUOUS
Status: DISCONTINUED | OUTPATIENT
Start: 2023-10-28 | End: 2023-10-29

## 2023-10-28 RX ORDER — ATROPINE SULFATE 0.1 MG/ML
1 INJECTION INTRAVENOUS
Status: COMPLETED | OUTPATIENT
Start: 2023-10-28 | End: 2023-10-28

## 2023-10-28 RX ORDER — LEVETIRACETAM 10 MG/ML
1000 INJECTION INTRAVASCULAR EVERY 12 HOURS
Status: DISCONTINUED | OUTPATIENT
Start: 2023-10-29 | End: 2023-10-28

## 2023-10-28 RX ORDER — POTASSIUM CHLORIDE 14.9 MG/ML
60 INJECTION INTRAVENOUS
Status: DISCONTINUED | OUTPATIENT
Start: 2023-10-28 | End: 2023-11-02 | Stop reason: SDUPTHER

## 2023-10-28 RX ORDER — ENOXAPARIN SODIUM 100 MG/ML
40 INJECTION SUBCUTANEOUS EVERY 24 HOURS
Status: DISCONTINUED | OUTPATIENT
Start: 2023-10-28 | End: 2023-10-30

## 2023-10-28 RX ORDER — CALCIUM GLUCONATE 20 MG/ML
1 INJECTION, SOLUTION INTRAVENOUS
Status: DISCONTINUED | OUTPATIENT
Start: 2023-10-28 | End: 2023-10-31

## 2023-10-28 RX ORDER — FENTANYL CITRATE-0.9 % NACL/PF 10 MCG/ML
0-300 PLASTIC BAG, INJECTION (ML) INTRAVENOUS CONTINUOUS
Status: DISCONTINUED | OUTPATIENT
Start: 2023-10-28 | End: 2023-10-30

## 2023-10-28 RX ORDER — LORAZEPAM 2 MG/ML
2 INJECTION INTRAMUSCULAR ONCE
Status: COMPLETED | OUTPATIENT
Start: 2023-10-28 | End: 2023-10-28

## 2023-10-28 RX ORDER — CALCIUM GLUCONATE 20 MG/ML
2 INJECTION, SOLUTION INTRAVENOUS
Status: DISCONTINUED | OUTPATIENT
Start: 2023-10-28 | End: 2023-10-31

## 2023-10-28 RX ORDER — POTASSIUM CHLORIDE 29.8 MG/ML
40 INJECTION INTRAVENOUS
Status: DISCONTINUED | OUTPATIENT
Start: 2023-10-28 | End: 2023-11-02 | Stop reason: SDUPTHER

## 2023-10-28 RX ORDER — CALCIUM GLUCONATE 98 MG/ML
1 INJECTION, SOLUTION INTRAVENOUS ONCE
Status: COMPLETED | OUTPATIENT
Start: 2023-10-28 | End: 2023-10-28

## 2023-10-28 RX ORDER — LORAZEPAM 2 MG/ML
2 INJECTION INTRAMUSCULAR
Status: DISCONTINUED | OUTPATIENT
Start: 2023-10-28 | End: 2023-10-30

## 2023-10-28 RX ORDER — MUPIROCIN 20 MG/G
OINTMENT TOPICAL 2 TIMES DAILY
Status: DISPENSED | OUTPATIENT
Start: 2023-10-28 | End: 2023-11-02

## 2023-10-28 RX ORDER — PROPOFOL 10 MG/ML
0-50 INJECTION, EMULSION INTRAVENOUS CONTINUOUS
Status: DISCONTINUED | OUTPATIENT
Start: 2023-10-28 | End: 2023-10-30

## 2023-10-28 RX ORDER — CHLORHEXIDINE GLUCONATE ORAL RINSE 1.2 MG/ML
15 SOLUTION DENTAL 2 TIMES DAILY
Status: DISCONTINUED | OUTPATIENT
Start: 2023-10-28 | End: 2023-10-31

## 2023-10-28 RX ADMIN — DOPAMINE HYDROCHLORIDE 5 MCG/KG/MIN: 160 INJECTION, SOLUTION INTRAVENOUS at 05:10

## 2023-10-28 RX ADMIN — NOREPINEPHRINE BITARTRATE 0.2 MCG/KG/MIN: 4 INJECTION, SOLUTION INTRAVENOUS at 12:10

## 2023-10-28 RX ADMIN — FENTANYL CITRATE 200 MCG: 50 INJECTION INTRAMUSCULAR; INTRAVENOUS at 10:10

## 2023-10-28 RX ADMIN — CISATRACURIUM BESYLATE 3 MCG/KG/MIN: 10 INJECTION INTRAVENOUS at 11:10

## 2023-10-28 RX ADMIN — LORAZEPAM 2 MG: 2 INJECTION INTRAMUSCULAR; INTRAVENOUS at 06:10

## 2023-10-28 RX ADMIN — DEXTROSE MONOHYDRATE 1000 MG PE: 50 INJECTION, SOLUTION INTRAVENOUS at 09:10

## 2023-10-28 RX ADMIN — AMIODARONE HYDROCHLORIDE 1 MG/MIN: 1.8 INJECTION, SOLUTION INTRAVENOUS at 09:10

## 2023-10-28 RX ADMIN — PROPOFOL 25 MCG/KG/MIN: 10 INJECTION, EMULSION INTRAVENOUS at 11:10

## 2023-10-28 RX ADMIN — NOREPINEPHRINE BITARTRATE 0.22 MCG/KG/MIN: 4 INJECTION, SOLUTION INTRAVENOUS at 04:10

## 2023-10-28 RX ADMIN — PROPOFOL 15 MCG/KG/MIN: 10 INJECTION, EMULSION INTRAVENOUS at 09:10

## 2023-10-28 RX ADMIN — AMIODARONE HYDROCHLORIDE 0.5 MG/MIN: 1.8 INJECTION, SOLUTION INTRAVENOUS at 03:10

## 2023-10-28 RX ADMIN — CALCIUM GLUCONATE 1 G: 98 INJECTION, SOLUTION INTRAVENOUS at 06:10

## 2023-10-28 RX ADMIN — DOPAMINE HYDROCHLORIDE 5 MCG/KG/MIN: 160 INJECTION, SOLUTION INTRAVENOUS at 08:10

## 2023-10-28 RX ADMIN — Medication 100 MCG/HR: at 10:10

## 2023-10-28 RX ADMIN — PROPOFOL 30 MCG/KG/MIN: 10 INJECTION, EMULSION INTRAVENOUS at 05:10

## 2023-10-28 RX ADMIN — PROPOFOL 30 MCG/KG/MIN: 10 INJECTION, EMULSION INTRAVENOUS at 12:10

## 2023-10-28 RX ADMIN — LORAZEPAM 2 MG: 2 INJECTION INTRAMUSCULAR at 06:10

## 2023-10-28 RX ADMIN — ATROPINE SULFATE 1 MG: 0.1 INJECTION INTRAVENOUS at 10:10

## 2023-10-28 RX ADMIN — CHLORHEXIDINE GLUCONATE 0.12% ORAL RINSE 15 ML: 1.2 LIQUID ORAL at 09:10

## 2023-10-28 RX ADMIN — MUPIROCIN: 20 OINTMENT TOPICAL at 09:10

## 2023-10-28 RX ADMIN — NOREPINEPHRINE BITARTRATE 0.05 MCG/KG/MIN: 4 INJECTION, SOLUTION INTRAVENOUS at 10:10

## 2023-10-28 RX ADMIN — SODIUM CHLORIDE, POTASSIUM CHLORIDE, SODIUM LACTATE AND CALCIUM CHLORIDE: 600; 310; 30; 20 INJECTION, SOLUTION INTRAVENOUS at 04:10

## 2023-10-28 RX ADMIN — AMIODARONE HYDROCHLORIDE 0.5 MG/MIN: 1.8 INJECTION, SOLUTION INTRAVENOUS at 08:10

## 2023-10-28 RX ADMIN — ENOXAPARIN SODIUM 40 MG: 40 INJECTION SUBCUTANEOUS at 04:10

## 2023-10-28 RX ADMIN — SODIUM CHLORIDE 3369 ML: 9 INJECTION, SOLUTION INTRAVENOUS at 09:10

## 2023-10-28 RX ADMIN — FAMOTIDINE 20 MG: 10 INJECTION, SOLUTION INTRAVENOUS at 09:10

## 2023-10-28 NOTE — NURSING
Per communication with MD Tate, amio gtt stopped when HR dropped to 35.  Titrated down prop hoping that would also buffet low HR.  No improvement, MD notified for any possible new directives.

## 2023-10-28 NOTE — ASSESSMENT & PLAN NOTE
Patient with Hypercapnic Respiratory failure which is Acute.  he is not on home oxygen. Supplemental oxygen was provided and noted- Vent Mode: A/C  Oxygen Concentration (%):  [40-50] 40  Resp Rate Total:  [20 br/min-25 br/min] 20 br/min  Vt Set:  [500 mL] 500 mL  PEEP/CPAP:  [5 cmH20] 5 cmH20  Mean Airway Pressure:  [8.3 nwE45-27 cmH20] 8.3 cmH20    .   Signs/symptoms of respiratory failure include- lethargy. Contributing diagnoses includes - cardiac arrest Labs and images were reviewed. Patient Has recent ABG, which has been reviewed. Will treat underlying causes and adjust management of respiratory failure as follows - ventilator support    - intubated 10/28  - ABGs reviewed; continue PRN  - wean settings, as able  - vent bundle in place  - SAT when he completes TTM

## 2023-10-28 NOTE — PROCEDURES
"Carlin Isaac is a 68 y.o. male patient.    Temp: (!) 95 °F (35 °C) (10/28/23 1731)  Pulse: (!) 35 (10/28/23 1731)  Resp: 20 (10/28/23 1731)  BP: (!) 100/53 (10/28/23 1731)  SpO2: 100 % (10/28/23 1731)  Weight: 112 kg (246 lb 14.6 oz) (10/28/23 1413)  Height: 6' 3" (190.5 cm) (10/28/23 1432)       Arterial Line    Date/Time: 10/28/2023 6:45 PM  Location procedure was performed: [facility] PULMONARY MEDICINE    Performed by: Marc Tate MD  Authorized by: Marc Tate MD  Preparation: Patient was prepped and draped in the usual sterile fashion.  Indications: multiple ABGs and hemodynamic monitoring  Location: left femoral    Patient sedated: yes  Sedatives: propofol  Analgesia: fentanyl  Vitals: Vital signs were monitored during sedation.  Needle gauge: 20  Number of attempts: 1  Technical procedures used: catheter over needle; ultrasound guidance with sterile probe cover  Complications: No  Specimens: No  Implants: No  Post-procedure: line sutured and dressing applied  Post-procedure CMS: normal  Patient tolerance: Patient tolerated the procedure well with no immediate complications          10/28/2023    "

## 2023-10-28 NOTE — ASSESSMENT & PLAN NOTE
- presumed VT/VF arrest given 3 shocks, 3 epis, and amio bolus in the field  - TTM with goal 36 initially  - protocol in place  - amio drip in place  - Cardiology consulted  - Echo fairly unremarkable  - cardiac monitoring  - replete lytes PRN  - CT Head without acute findings initially  - trend troponins  - wean Levo for goal MAP > 65

## 2023-10-28 NOTE — ASSESSMENT & PLAN NOTE
- Cr 1.9 at admission; b/l around 1.2  - likely 2/2 to arrest  - will monitor with resuscitation   - renally dose meds and avoid nephrotoxins

## 2023-10-28 NOTE — HPI
Mr Isaac is a 69 y/o man with HTN, hypogonadism on chonic testosterone, Chrohn's dz/Ulcerative proctitis, CAD s/p stent to the proximal/mid LAD in 2007, and HLD who presents to the ER today after an out of hospital cardiac arrest.  History taken from the medical record and discussion with staff as patient is unable to provide history and no family at bedside during my exam.  Reportedly, he was working out at [employer] when he suddenly collapsed.  EMS administered 3 shocks, 3 epis, and amio bolus en route.  Presumed rythm was VF.    Of note, he had angina in Sept 2022 and [facility] with multi-vessel disease.  He was recommended for CABG, but he declined at the time as his angina had resolved.    In the ER, lab work notable for INR 1.3, Cr 1.9, mildly elevated LFTs,  with negative initial troponin.  Echo fairly unremarkable.  He is intubated and sedated.  TTM in place and will change goal to 36.  Currently on Levo for vasopressor support through CVL placed by ER.

## 2023-10-28 NOTE — H&P (VIEW-ONLY)
[facility] - Intensive Care ([facility])  Cardiology  Consult Note    Patient Name: Carlin Isaac  MRN: [id number]  Admission Date: 10/28/2023  Hospital Length of Stay: 0 days  Code Status: Full Code   Attending Provider: Marc Tate MD   Consulting Provider: Héctor Graham MD  Primary Care Physician: Eliot Ambriz MD  Principal Problem:Cardiac arrest    Patient information was obtained from relative(s) and ER records.     Inpatient consult to Cardiology  Consult performed by: Héctor Graham MD  Consult ordered by: Duran Muniz MD        Subjective:     Chief Complaint:  VT arrest     HPI:   History of Present Illness: Carlin Isaac is a 68 y.o. male patient who presents to the Emergency Department for evaluation of cardiac arrest onset PTA. Pt was working out at a [employer] when he suddenly collapsed. EMS administered 3 shocks, 3 epis, and amio while on route. Symptoms are constant and moderate in severity. No mitigating or exacerbating factors reported. Associated sxs not reported. Other prior Tx not reported. No further complaints or concerns at this time.     Per significant other, Coronary artery disease involving native coronary artery of native heart without angina pectoris  -s/p remote PCI/stent of the proximal/mid LAD 2007  -[facility] 09/2022 with distal left main, ostial LAD, and proximal LCx disease w/left dominate circulation, has preserved LVEF-CT surgery consulted and recommended elective CABG. Medical therapy optimized, and anginal symptoms did not reoccur therefore patient deferred CABG-will obtain if has further issues with angina  -will continue on current GDMT with aspirin, statin, BB, and long-acting nitrate     H/O bilateral carotid disease of 30% and HTN.      Past Medical History:   Diagnosis Date    Kidney stone        No past surgical history on file.    Review of patient's allergies indicates:  No Known Allergies    No current facility-administered medications  on file prior to encounter.     Current Outpatient Medications on File Prior to Encounter   Medication Sig    hydroCHLOROthiazide (HYDRODIURIL) 25 MG tablet Take 25 mg by mouth once daily.    ibuprofen (ADVIL,MOTRIN) 800 MG tablet Take 800 mg by mouth 3 (three) times daily as needed.    isosorbide mononitrate (IMDUR) 30 MG 24 hr tablet Take 30 mg by mouth once daily.    lisinopriL (PRINIVIL,ZESTRIL) 40 MG tablet Take 40 mg by mouth once daily.    metoprolol succinate (TOPROL-XL) 25 MG 24 hr tablet Take 25 mg by mouth once daily.    oxyCODONE-acetaminophen (PERCOCET)  mg per tablet Take 1 tablet by mouth every 6 (six) hours as needed.    rosuvastatin (CRESTOR) 20 MG tablet Take 20 mg by mouth once daily.    testosterone cypionate (DEPOTESTOTERONE CYPIONATE) 200 mg/mL injection Inject 200 mg into the muscle every 7 days.     Family History       Problem Relation (Age of Onset)    Prostate cancer Father          Tobacco Use    Smoking status: Never    Smokeless tobacco: Never   Substance and Sexual Activity    Alcohol use: Never    Drug use: Never    Sexual activity: Not on file     Review of Systems   Unable to perform ROS: Acuity of condition     Objective:     Vital Signs (Most Recent):  Temp: (!) 93.6 °F (34.2 °C) (10/28/23 1245)  Pulse: (!) 56 (10/28/23 1245)  Resp: 20 (10/28/23 1245)  BP: (!) 157/95 (10/28/23 1220)  SpO2: 99 % (10/28/23 1245) Vital Signs (24h Range):  Temp:  [93 °F (33.9 °C)-98.8 °F (37.1 °C)] 93.6 °F (34.2 °C)  Pulse:  [46-82] 56  Resp:  [0-32] 20  SpO2:  [94 %-100 %] 99 %  BP: ()/(43-95) 157/95     Weight: 112 kg (247 lb)  Body mass index is 30.87 kg/m².    SpO2: 99 %         Intake/Output Summary (Last 24 hours) at 10/28/2023 1255  Last data filed at 10/28/2023 1222  Gross per 24 hour   Intake --   Output 285 ml   Net -285 ml       Lines/Drains/Airways       Central Venous Catheter Line  Duration             Percutaneous Central Line Insertion/Assessment - Triple Lumen   10/28/23 0950 Internal Jugular Right <1 day              Drain  Duration                  NG/OG Tube 10/28/23 0940 [place] sump 16 Fr. Center mouth <1 day         Urethral Catheter 10/28/23 1000 Latex 16 Fr. <1 day              Airway  Duration                  Airway - Non-Surgical 10/28/23 0900 Endotracheal Tube <1 day              Peripheral Intravenous Line  Duration                  Peripheral IV - Single Lumen 10/28/23 0915 20 G Left;Posterior Forearm <1 day         Peripheral IV - Single Lumen 10/28/23 0919 20 G Right Antecubital <1 day                     Physical Exam  Cardiovascular:      Rate and Rhythm: Tachycardia present.   Pulmonary:      Breath sounds: Normal breath sounds.          Significant Labs: CMP   Recent Labs   Lab 10/28/23  0914      K 4.1      CO2 11*   *   BUN 27*   CREATININE 1.9*   CALCIUM 8.3*   PROT 6.8   ALBUMIN 4.0   BILITOT 0.5   ALKPHOS 56   *   *   ANIONGAP 27*   , CBC   Recent Labs   Lab 10/28/23  0914   WBC 12.60   HGB 16.9   HCT 53.3      , and Troponin   Recent Labs   Lab 10/28/23  0914   TROPONINI 0.020       Significant Imaging: Echocardiogram: Transthoracic echo (TTE) complete ([facility] Only):   Results for orders placed or performed during the hospital encounter of 10/28/23   Echo   Result Value Ref Range    BSA 2.43 m2    LVOT stroke volume 48.16 cm3    LVIDd 5.35 3.5 - 6.0 cm    LV Systolic Volume 62.38 mL    LV Systolic Volume Index 26.0 mL/m2    LVIDs 3.81 2.1 - 4.0 cm    LV Diastolic Volume 138.32 mL    LV Diastolic Volume Index 57.63 mL/m2    IVS 1.09 0.6 - 1.1 cm    LVOT diameter 1.94 cm    LVOT area 3.0 cm2    FS 29 28 - 44 %    Left Ventricle Relative Wall Thickness 0.42 cm    Posterior Wall 1.13 (A) 0.6 - 1.1 cm    LV mass 234.12 g    LV Mass Index 98 g/m2    MV Peak E Dima 0.88 m/s    TDI LATERAL 0.09 m/s    TDI SEPTAL 0.11 m/s    E/E' ratio 8.80 m/s    MV Peak A Dima 0.43 m/s    TR Max Dima 2.89 m/s    E/A ratio 2.05     IVRT  64.70 msec    E wave deceleration time 189.74 msec    LV SEPTAL E/E' RATIO 8.00 m/s    LV LATERAL E/E' RATIO 9.78 m/s    LVOT peak jannie 0.77 m/s    Left Ventricular Outflow Tract Mean Velocity 0.60 cm/s    Left Ventricular Outflow Tract Mean Gradient 1.52 mmHg    LA size 4.30 cm    Left Atrium Minor Axis 6.05 cm    Left Atrium Major Axis 6.14 cm    RVDD 2.69 cm    RVOT peak VTI 13.7 cm    TAPSE 2.09 cm    RA Major Axis 5.53 cm    AV mean gradient 3 mmHg    AV peak gradient 4 mmHg    Ao peak jannie 1.06 m/s    Ao VTI 20.60 cm    LVOT peak VTI 16.30 cm    AV valve area 2.34 cm²    AV Velocity Ratio 0.73     AV index (prosthetic) 0.79     ARMANDO by Velocity Ratio 2.15 cm²    Mr max jannie 4.82 m/s    MV stenosis pressure 1/2 time 55.02 ms    MV valve area p 1/2 method 4.00 cm2    TV mean gradient 29 mmHg    Triscuspid Valve Regurgitation Peak Gradient 33 mmHg    PV mean gradient 1 mmHg    RVOT peak jannie 0.66 m/s    Ao root annulus 3.05 cm    STJ 2.96 cm    Ascending aorta 2.86 cm    IVC diameter 2.68 cm    Mean e' 0.10 m/s    ZLVIDS -4.28     ZLVIDD -7.15     LA Volume Index 41.8 mL/m2    LA volume 100.24 cm3    LA WIDTH 4.5 cm    RA Width 4.0 cm    TV resting pulmonary artery pressure 36 mmHg    RV TB RVSP 6 mmHg    Est. RA pres 3 mmHg    Narrative      Left Ventricle: The left ventricle is normal in size. Normal wall   thickness. Normal wall motion. There is low normal systolic function with   a visually estimated ejection fraction of 50 - 55%. There is normal   diastolic function.    Right Ventricle: Normal right ventricular cavity size. Wall thickness   is normal. Right ventricle wall motion  is normal. Systolic function is   normal.    Mitral Valve: There is mild regurgitation with a centrally directed   jet.    Tricuspid Valve: There is mild regurgitation with a centrally directed   jet.    Pulmonic Valve: There is mild regurgitation with a centrally directed   jet.    Pulmonary Artery: The estimated pulmonary artery  systolic pressure is   36 mmHg.    IVC/SVC: Normal venous pressure at 3 mmHg.       Assessment and Plan:     * Cardiac arrest  Will continue supportive care  Continue amiodarone  Continue enoxaparin  Add BB if needed    Coronary artery disease involving native coronary artery of native heart  Significant CAD, EKG shows anterior and lateral ischemia  Plan nitrates if possible  1 st troponin negative and will trend  Likely cardiac event was VT  Cardiac echo shows  Preserved LV function        VTE Risk Mitigation (From admission, onward)         Ordered     enoxaparin injection 40 mg  Daily         10/28/23 1229     IP VTE HIGH RISK PATIENT  Once         10/28/23 1229     Place sequential compression device  Until discontinued         10/28/23 1229                Thank you for your consult. I will follow-up with patient. Please contact us if you have any additional questions.    Héctor Graham MD  Cardiology   [facility] - Intensive Care ([facility])

## 2023-10-28 NOTE — ASSESSMENT & PLAN NOTE
Doesn't appear he takes anything regularly at home (mention of PRN sulfasalazine in some clinic notes)  Supportive Care

## 2023-10-28 NOTE — SUBJECTIVE & OBJECTIVE
Past Medical History:   Diagnosis Date    Kidney stone        No past surgical history on file.    Review of patient's allergies indicates:  No Known Allergies    Family History       Problem Relation (Age of Onset)    Prostate cancer Father          Tobacco Use    Smoking status: Never    Smokeless tobacco: Never   Substance and Sexual Activity    Alcohol use: Never    Drug use: Never    Sexual activity: Not on file         Review of Systems   Unable to perform ROS: Intubated     Objective:     Vital Signs (Most Recent):  Temp: (!) 93.6 °F (34.2 °C) (10/28/23 1245)  Pulse: (!) 56 (10/28/23 1245)  Resp: 20 (10/28/23 1245)  BP: (!) 157/95 (10/28/23 1220)  SpO2: 99 % (10/28/23 1245) Vital Signs (24h Range):  Temp:  [93 °F (33.9 °C)-98.8 °F (37.1 °C)] 93.6 °F (34.2 °C)  Pulse:  [46-82] 56  Resp:  [0-32] 20  SpO2:  [94 %-100 %] 99 %  BP: ()/(43-95) 157/95     Weight: 112 kg (247 lb)  Body mass index is 30.87 kg/m².      Intake/Output Summary (Last 24 hours) at 10/28/2023 1254  Last data filed at 10/28/2023 1222  Gross per 24 hour   Intake --   Output 285 ml   Net -285 ml        Physical Exam  Vitals and nursing note reviewed.   Constitutional:       General: He is not in acute distress.     Comments: Intubated and sedated   HENT:      Head: Normocephalic and atraumatic.   Eyes:      General: No scleral icterus.     Conjunctiva/sclera: Conjunctivae normal.      Pupils: Pupils are equal, round, and reactive to light.   Cardiovascular:      Rate and Rhythm: Regular rhythm. Bradycardia present.      Pulses: Normal pulses.      Heart sounds: No murmur heard.  Pulmonary:      Effort: Pulmonary effort is normal. No respiratory distress.      Breath sounds: No wheezing, rhonchi or rales.   Abdominal:      General: Abdomen is flat. There is no distension.      Palpations: Abdomen is soft.      Tenderness: There is no abdominal tenderness.   Musculoskeletal:      Cervical back: Normal range of motion and neck supple. No  rigidity or tenderness.      Right lower leg: No edema.      Left lower leg: No edema.   Skin:     Comments: Very tan          Vents:  Vent Mode: A/C (10/28/23 1131)  Ventilator Initiated: Yes (10/28/23 0938)  Set Rate: 20 BPM (10/28/23 1131)  Vt Set: 500 mL (10/28/23 1131)  PEEP/CPAP: 5 cmH20 (10/28/23 1131)  Oxygen Concentration (%): 40 (10/28/23 1245)  Peak Airway Pressure: 11 cmH20 (10/28/23 1131)  Plateau Pressure: 0 cmH20 (10/28/23 1131)  Total Ve: 12.3 L/m (10/28/23 1131)  Negative Inspiratory Force (cm H2O): 0 (10/28/23 1131)  F/VT Ratio<105 (RSBI): (!) 54.9 (10/28/23 1131)    Lines/Drains/Airways       Central Venous Catheter Line  Duration             Percutaneous Central Line Insertion/Assessment - Triple Lumen  10/28/23 0950 Internal Jugular Right <1 day              Drain  Duration                  NG/OG Tube 10/28/23 0940 [place] sump 16 Fr. Center mouth <1 day         Urethral Catheter 10/28/23 1000 Latex 16 Fr. <1 day              Airway  Duration                  Airway - Non-Surgical 10/28/23 0900 Endotracheal Tube <1 day              Peripheral Intravenous Line  Duration                  Peripheral IV - Single Lumen 10/28/23 0915 20 G Left;Posterior Forearm <1 day         Peripheral IV - Single Lumen 10/28/23 0919 20 G Right Antecubital <1 day                    Significant Labs:    CBC/Anemia Profile:  Recent Labs   Lab 10/28/23  0914   WBC 12.60   HGB 16.9   HCT 53.3      MCV 94   RDW 13.3        Chemistries:  Recent Labs   Lab 10/28/23  0914      K 4.1      CO2 11*   BUN 27*   CREATININE 1.9*   CALCIUM 8.3*   ALBUMIN 4.0   PROT 6.8   BILITOT 0.5   ALKPHOS 56   *   *       All pertinent labs within the past 24 hours have been reviewed.    Significant Imaging:   I have reviewed all pertinent imaging results/findings within the past 24 hours.

## 2023-10-28 NOTE — CONSULTS
[facility] - Intensive Care ([facility])  Cardiology  Consult Note    Patient Name: Carlin Isaac  MRN: [id number]  Admission Date: 10/28/2023  Hospital Length of Stay: 0 days  Code Status: Full Code   Attending Provider: Marc Tate MD   Consulting Provider: Héctor Graham MD  Primary Care Physician: Eliot Ambriz MD  Principal Problem:Cardiac arrest    Patient information was obtained from relative(s) and ER records.     Inpatient consult to Cardiology  Consult performed by: Héctor Graham MD  Consult ordered by: Duran Muniz MD        Subjective:     Chief Complaint:  VT arrest     HPI:   History of Present Illness: Carlin Isaac is a 68 y.o. male patient who presents to the Emergency Department for evaluation of cardiac arrest onset PTA. Pt was working out at a [email or web address] when he suddenly collapsed. EMS administered 3 shocks, 3 epis, and amio while on route. Symptoms are constant and moderate in severity. No mitigating or exacerbating factors reported. Associated sxs not reported. Other prior Tx not reported. No further complaints or concerns at this time.     Per significant other, Coronary artery disease involving native coronary artery of native heart without angina pectoris  -s/p remote PCI/stent of the proximal/mid LAD 2007  -[facility] 09/2022 with distal left main, ostial LAD, and proximal LCx disease w/left dominate circulation, has preserved LVEF-CT surgery consulted and recommended elective CABG. Medical therapy optimized, and anginal symptoms did not reoccur therefore patient deferred CABG-will obtain if has further issues with angina  -will continue on current GDMT with aspirin, statin, BB, and long-acting nitrate     H/O bilateral carotid disease of 30% and HTN.      Past Medical History:   Diagnosis Date    Kidney stone        No past surgical history on file.    Review of patient's allergies indicates:  No Known Allergies    No current facility-administered medications  on file prior to encounter.     Current Outpatient Medications on File Prior to Encounter   Medication Sig    hydroCHLOROthiazide (HYDRODIURIL) 25 MG tablet Take 25 mg by mouth once daily.    ibuprofen (ADVIL,MOTRIN) 800 MG tablet Take 800 mg by mouth 3 (three) times daily as needed.    isosorbide mononitrate (IMDUR) 30 MG 24 hr tablet Take 30 mg by mouth once daily.    lisinopriL (PRINIVIL,ZESTRIL) 40 MG tablet Take 40 mg by mouth once daily.    metoprolol succinate (TOPROL-XL) 25 MG 24 hr tablet Take 25 mg by mouth once daily.    oxyCODONE-acetaminophen (PERCOCET)  mg per tablet Take 1 tablet by mouth every 6 (six) hours as needed.    rosuvastatin (CRESTOR) 20 MG tablet Take 20 mg by mouth once daily.    testosterone cypionate (DEPOTESTOTERONE CYPIONATE) 200 mg/mL injection Inject 200 mg into the muscle every 7 days.     Family History       Problem Relation (Age of Onset)    Prostate cancer Father          Tobacco Use    Smoking status: Never    Smokeless tobacco: Never   Substance and Sexual Activity    Alcohol use: Never    Drug use: Never    Sexual activity: Not on file     Review of Systems   Unable to perform ROS: Acuity of condition     Objective:     Vital Signs (Most Recent):  Temp: (!) 93.6 °F (34.2 °C) (10/28/23 1245)  Pulse: (!) 56 (10/28/23 1245)  Resp: 20 (10/28/23 1245)  BP: (!) 157/95 (10/28/23 1220)  SpO2: 99 % (10/28/23 1245) Vital Signs (24h Range):  Temp:  [93 °F (33.9 °C)-98.8 °F (37.1 °C)] 93.6 °F (34.2 °C)  Pulse:  [46-82] 56  Resp:  [0-32] 20  SpO2:  [94 %-100 %] 99 %  BP: ()/(43-95) 157/95     Weight: 112 kg (247 lb)  Body mass index is 30.87 kg/m².    SpO2: 99 %         Intake/Output Summary (Last 24 hours) at 10/28/2023 1255  Last data filed at 10/28/2023 1222  Gross per 24 hour   Intake --   Output 285 ml   Net -285 ml       Lines/Drains/Airways       Central Venous Catheter Line  Duration             Percutaneous Central Line Insertion/Assessment - Triple Lumen   10/28/23 0950 Internal Jugular Right <1 day              Drain  Duration                  NG/OG Tube 10/28/23 0940 [place] sump 16 Fr. Center mouth <1 day         Urethral Catheter 10/28/23 1000 Latex 16 Fr. <1 day              Airway  Duration                  Airway - Non-Surgical 10/28/23 0900 Endotracheal Tube <1 day              Peripheral Intravenous Line  Duration                  Peripheral IV - Single Lumen 10/28/23 0915 20 G Left;Posterior Forearm <1 day         Peripheral IV - Single Lumen 10/28/23 0919 20 G Right Antecubital <1 day                     Physical Exam  Cardiovascular:      Rate and Rhythm: Tachycardia present.   Pulmonary:      Breath sounds: Normal breath sounds.          Significant Labs: CMP   Recent Labs   Lab 10/28/23  0914      K 4.1      CO2 11*   *   BUN 27*   CREATININE 1.9*   CALCIUM 8.3*   PROT 6.8   ALBUMIN 4.0   BILITOT 0.5   ALKPHOS 56   *   *   ANIONGAP 27*   , CBC   Recent Labs   Lab 10/28/23  0914   WBC 12.60   HGB 16.9   HCT 53.3      , and Troponin   Recent Labs   Lab 10/28/23  0914   TROPONINI 0.020       Significant Imaging: Echocardiogram: Transthoracic echo (TTE) complete (Cupid Only):   Results for orders placed or performed during the hospital encounter of 10/28/23   Echo   Result Value Ref Range    BSA 2.43 m2    LVOT stroke volume 48.16 cm3    LVIDd 5.35 3.5 - 6.0 cm    LV Systolic Volume 62.38 mL    LV Systolic Volume Index 26.0 mL/m2    LVIDs 3.81 2.1 - 4.0 cm    LV Diastolic Volume 138.32 mL    LV Diastolic Volume Index 57.63 mL/m2    IVS 1.09 0.6 - 1.1 cm    LVOT diameter 1.94 cm    LVOT area 3.0 cm2    FS 29 28 - 44 %    Left Ventricle Relative Wall Thickness 0.42 cm    Posterior Wall 1.13 (A) 0.6 - 1.1 cm    LV mass 234.12 g    LV Mass Index 98 g/m2    MV Peak E Dima 0.88 m/s    TDI LATERAL 0.09 m/s    TDI SEPTAL 0.11 m/s    E/E' ratio 8.80 m/s    MV Peak A Dima 0.43 m/s    TR Max Dima 2.89 m/s    E/A ratio 2.05     IVRT  64.70 msec    E wave deceleration time 189.74 msec    LV SEPTAL E/E' RATIO 8.00 m/s    LV LATERAL E/E' RATIO 9.78 m/s    LVOT peak jannie 0.77 m/s    Left Ventricular Outflow Tract Mean Velocity 0.60 cm/s    Left Ventricular Outflow Tract Mean Gradient 1.52 mmHg    LA size 4.30 cm    Left Atrium Minor Axis 6.05 cm    Left Atrium Major Axis 6.14 cm    RVDD 2.69 cm    RVOT peak VTI 13.7 cm    TAPSE 2.09 cm    RA Major Axis 5.53 cm    AV mean gradient 3 mmHg    AV peak gradient 4 mmHg    Ao peak jannie 1.06 m/s    Ao VTI 20.60 cm    LVOT peak VTI 16.30 cm    AV valve area 2.34 cm²    AV Velocity Ratio 0.73     AV index (prosthetic) 0.79     ARMANDO by Velocity Ratio 2.15 cm²    Mr max jannie 4.82 m/s    MV stenosis pressure 1/2 time 55.02 ms    MV valve area p 1/2 method 4.00 cm2    TV mean gradient 29 mmHg    Triscuspid Valve Regurgitation Peak Gradient 33 mmHg    PV mean gradient 1 mmHg    RVOT peak jannie 0.66 m/s    Ao root annulus 3.05 cm    STJ 2.96 cm    Ascending aorta 2.86 cm    IVC diameter 2.68 cm    Mean e' 0.10 m/s    ZLVIDS -4.28     ZLVIDD -7.15     LA Volume Index 41.8 mL/m2    LA volume 100.24 cm3    LA WIDTH 4.5 cm    RA Width 4.0 cm    TV resting pulmonary artery pressure 36 mmHg    RV TB RVSP 6 mmHg    Est. RA pres 3 mmHg    Narrative      Left Ventricle: The left ventricle is normal in size. Normal wall   thickness. Normal wall motion. There is low normal systolic function with   a visually estimated ejection fraction of 50 - 55%. There is normal   diastolic function.    Right Ventricle: Normal right ventricular cavity size. Wall thickness   is normal. Right ventricle wall motion  is normal. Systolic function is   normal.    Mitral Valve: There is mild regurgitation with a centrally directed   jet.    Tricuspid Valve: There is mild regurgitation with a centrally directed   jet.    Pulmonic Valve: There is mild regurgitation with a centrally directed   jet.    Pulmonary Artery: The estimated pulmonary artery  systolic pressure is   36 mmHg.    IVC/SVC: Normal venous pressure at 3 mmHg.       Assessment and Plan:     * Cardiac arrest  Will continue supportive care  Continue amiodarone  Continue enoxaparin  Add BB if needed    Coronary artery disease involving native coronary artery of native heart  Significant CAD, EKG shows anterior and lateral ischemia  Plan nitrates if possible  1 st troponin negative and will trend  Likely cardiac event was VT  Cardiac echo shows  Preserved LV function        VTE Risk Mitigation (From admission, onward)         Ordered     enoxaparin injection 40 mg  Daily         10/28/23 1229     IP VTE HIGH RISK PATIENT  Once         10/28/23 1229     Place sequential compression device  Until discontinued         10/28/23 1229                Thank you for your consult. I will follow-up with patient. Please contact us if you have any additional questions.    Héctor Graham MD  Cardiology   [facility] - Int[facility] ([facility])

## 2023-10-28 NOTE — Clinical Note
40 ml of contrast were injected throughout the case. 0 mL of contrast was the total wasted during the case. 40 mL was the total amount used during the case.

## 2023-10-28 NOTE — SUBJECTIVE & OBJECTIVE
Past Medical History:   Diagnosis Date    Kidney stone        No past surgical history on file.    Review of patient's allergies indicates:  No Known Allergies    No current facility-administered medications on file prior to encounter.     Current Outpatient Medications on File Prior to Encounter   Medication Sig    hydroCHLOROthiazide (HYDRODIURIL) 25 MG tablet Take 25 mg by mouth once daily.    ibuprofen (ADVIL,MOTRIN) 800 MG tablet Take 800 mg by mouth 3 (three) times daily as needed.    isosorbide mononitrate (IMDUR) 30 MG 24 hr tablet Take 30 mg by mouth once daily.    lisinopriL (PRINIVIL,ZESTRIL) 40 MG tablet Take 40 mg by mouth once daily.    metoprolol succinate (TOPROL-XL) 25 MG 24 hr tablet Take 25 mg by mouth once daily.    oxyCODONE-acetaminophen (PERCOCET)  mg per tablet Take 1 tablet by mouth every 6 (six) hours as needed.    rosuvastatin (CRESTOR) 20 MG tablet Take 20 mg by mouth once daily.    testosterone cypionate (DEPOTESTOTERONE CYPIONATE) 200 mg/mL injection Inject 200 mg into the muscle every 7 days.     Family History       Problem Relation (Age of Onset)    Prostate cancer Father          Tobacco Use    Smoking status: Never    Smokeless tobacco: Never   Substance and Sexual Activity    Alcohol use: Never    Drug use: Never    Sexual activity: Not on file     Review of Systems   Unable to perform ROS: Acuity of condition     Objective:     Vital Signs (Most Recent):  Temp: (!) 93.6 °F (34.2 °C) (10/28/23 1245)  Pulse: (!) 56 (10/28/23 1245)  Resp: 20 (10/28/23 1245)  BP: (!) 157/95 (10/28/23 1220)  SpO2: 99 % (10/28/23 1245) Vital Signs (24h Range):  Temp:  [93 °F (33.9 °C)-98.8 °F (37.1 °C)] 93.6 °F (34.2 °C)  Pulse:  [46-82] 56  Resp:  [0-32] 20  SpO2:  [94 %-100 %] 99 %  BP: ()/(43-95) 157/95     Weight: 112 kg (247 lb)  Body mass index is 30.87 kg/m².    SpO2: 99 %         Intake/Output Summary (Last 24 hours) at 10/28/2023 1255  Last data filed at 10/28/2023 1222  Gross per  24 hour   Intake --   Output 285 ml   Net -285 ml       Lines/Drains/Airways       Central Venous Catheter Line  Duration             Percutaneous Central Line Insertion/Assessment - Triple Lumen  10/28/23 0950 Internal Jugular Right <1 day              Drain  Duration                  NG/OG Tube 10/28/23 0940 [place] sump 16 Fr. Center mouth <1 day         Urethral Catheter 10/28/23 1000 Latex 16 Fr. <1 day              Airway  Duration                  Airway - Non-Surgical 10/28/23 0900 Endotracheal Tube <1 day              Peripheral Intravenous Line  Duration                  Peripheral IV - Single Lumen 10/28/23 0915 20 G Left;Posterior Forearm <1 day         Peripheral IV - Single Lumen 10/28/23 0919 20 G Right Antecubital <1 day                     Physical Exam  Cardiovascular:      Rate and Rhythm: Tachycardia present.   Pulmonary:      Breath sounds: Normal breath sounds.          Significant Labs: CMP   Recent Labs   Lab 10/28/23  0914      K 4.1      CO2 11*   *   BUN 27*   CREATININE 1.9*   CALCIUM 8.3*   PROT 6.8   ALBUMIN 4.0   BILITOT 0.5   ALKPHOS 56   *   *   ANIONGAP 27*   , CBC   Recent Labs   Lab 10/28/23  0914   WBC 12.60   HGB 16.9   HCT 53.3      , and Troponin   Recent Labs   Lab 10/28/23  0914   TROPONINI 0.020       Significant Imaging: Echocardiogram: Transthoracic echo (TTE) complete (Cupid Only):   Results for orders placed or performed during the hospital encounter of 10/28/23   Echo   Result Value Ref Range    BSA 2.43 m2    LVOT stroke volume 48.16 cm3    LVIDd 5.35 3.5 - 6.0 cm    LV Systolic Volume 62.38 mL    LV Systolic Volume Index 26.0 mL/m2    LVIDs 3.81 2.1 - 4.0 cm    LV Diastolic Volume 138.32 mL    LV Diastolic Volume Index 57.63 mL/m2    IVS 1.09 0.6 - 1.1 cm    LVOT diameter 1.94 cm    LVOT area 3.0 cm2    FS 29 28 - 44 %    Left Ventricle Relative Wall Thickness 0.42 cm    Posterior Wall 1.13 (A) 0.6 - 1.1 cm    LV mass 234.12 g     LV Mass Index 98 g/m2    MV Peak E Dima 0.88 m/s    TDI LATERAL 0.09 m/s    TDI SEPTAL 0.11 m/s    E/E' ratio 8.80 m/s    MV Peak A Dima 0.43 m/s    TR Max Dima 2.89 m/s    E/A ratio 2.05     IVRT 64.70 msec    E wave deceleration time 189.74 msec    LV SEPTAL E/E' RATIO 8.00 m/s    LV LATERAL E/E' RATIO 9.78 m/s    LVOT peak dima 0.77 m/s    Left Ventricular Outflow Tract Mean Velocity 0.60 cm/s    Left Ventricular Outflow Tract Mean Gradient 1.52 mmHg    LA size 4.30 cm    Left Atrium Minor Axis 6.05 cm    Left Atrium Major Axis 6.14 cm    RVDD 2.69 cm    RVOT peak VTI 13.7 cm    TAPSE 2.09 cm    RA Major Axis 5.53 cm    AV mean gradient 3 mmHg    AV peak gradient 4 mmHg    Ao peak dima 1.06 m/s    Ao VTI 20.60 cm    LVOT peak VTI 16.30 cm    AV valve area 2.34 cm²    AV Velocity Ratio 0.73     AV index (prosthetic) 0.79     ARMANDO by Velocity Ratio 2.15 cm²    Mr max dima 4.82 m/s    MV stenosis pressure 1/2 time 55.02 ms    MV valve area p 1/2 method 4.00 cm2    TV mean gradient 29 mmHg    Triscuspid Valve Regurgitation Peak Gradient 33 mmHg    PV mean gradient 1 mmHg    RVOT peak dima 0.66 m/s    Ao root annulus 3.05 cm    STJ 2.96 cm    Ascending aorta 2.86 cm    IVC diameter 2.68 cm    Mean e' 0.10 m/s    ZLVIDS -4.28     ZLVIDD -7.15     LA Volume Index 41.8 mL/m2    LA volume 100.24 cm3    LA WIDTH 4.5 cm    RA Width 4.0 cm    TV resting pulmonary artery pressure 36 mmHg    RV TB RVSP 6 mmHg    Est. RA pres 3 mmHg    Narrative      Left Ventricle: The left ventricle is normal in size. Normal wall   thickness. Normal wall motion. There is low normal systolic function with   a visually estimated ejection fraction of 50 - 55%. There is normal   diastolic function.    Right Ventricle: Normal right ventricular cavity size. Wall thickness   is normal. Right ventricle wall motion  is normal. Systolic function is   normal.    Mitral Valve: There is mild regurgitation with a centrally directed   jet.    Tricuspid Valve: There  is mild regurgitation with a centrally directed   jet.    Pulmonic Valve: There is mild regurgitation with a centrally directed   jet.    Pulmonary Artery: The estimated pulmonary artery systolic pressure is   36 mmHg.    IVC/SVC: Normal venous pressure at 3 mmHg.

## 2023-10-28 NOTE — Clinical Note
The catheter was inserted into the left ventricle. Hemodynamics were performed.  An angiography was performed Multiple views were taken.

## 2023-10-28 NOTE — ASSESSMENT & PLAN NOTE
Significant CAD, EKG shows anterior and lateral ischemia  Plan nitrates if possible  1 st troponin negative and will trend  Likely cardiac event was VT  Cardiac echo shows  Preserved LV function

## 2023-10-28 NOTE — HPI
History of Present Illness: Carlin Isaac is a 68 y.o. male patient who presents to the Emergency Department for evaluation of cardiac arrest onset PTA. Pt was working out at a [employer] when he suddenly collapsed. EMS administered 3 shocks, 3 epis, and amio while on route. Symptoms are constant and moderate in severity. No mitigating or exacerbating factors reported. Associated sxs not reported. Other prior Tx not reported. No further complaints or concerns at this time.     Per significant other, Coronary artery disease involving native coronary artery of native heart without angina pectoris  -s/p remote PCI/stent of the proximal/mid LAD 2007  -[facility] 09/2022 with distal left main, ostial LAD, and proximal LCx disease w/left dominate circulation, has preserved LVEF-CT surgery consulted and recommended elective CABG. Medical therapy optimized, and anginal symptoms did not reoccur therefore patient deferred CABG-will obtain if has further issues with angina  -will continue on current GDMT with aspirin, statin, BB, and long-acting nitrate     H/O bilateral carotid disease of 30% and HTN.

## 2023-10-28 NOTE — PLAN OF CARE
Nutrition Recs: 10/28  1. Recommend pt be Initiated onto EN when medically appropriate.   - Peptamen Intense VHP. Initiate at 10 mL/hr, advanced as tolerated. Aim for goal rate of 60 mL/hr.   - Add Beneprotein x6 packs/day (fwf before and after).   - Formula provides with Beneprotein: 2123.28 Kcal (100% EEN), 168 g Protein (100% EPN), 1209.6 mL free water.   - 150 mL fwf q 4 hrs (900 mL) or per MD/NP.   - FWF + Free water = 2109.6 mL/day (100% EFN).   - Check Mg, Na, K+, Phos, and Glu before and after initiation, correct as indicated.   2. Re-consult for updated TF recs if propofol is stopped, decreased, or increased.  3. Weigh twice weekly.    Goals:   1. Pt will be initiated onto EN within 24 hrs.   2. Pt will consume and tolerate >50% of EEN/EPN prior to RD follow up.  Nutrition Goal Status: new  Communication of RD Recs: other (comment) (POC: Sticky Note)  Aj Busby, Registration Eligible, Provisional LDN

## 2023-10-28 NOTE — H&P
[facility] - Intensive Care ([facility])  Critical Care Medicine  History & Physical    Patient Name: Carlin Isaac  MRN: [id number]  Admission Date: 10/28/2023  Hospital Length of Stay: 0 days  Code Status: Full Code  Attending Physician: Marc Tate MD   Primary Care Provider: Eliot Ambriz MD   Principal Problem: Cardiac arrest    Subjective:     HPI:  Mr Isaac is a 69 y/o man with HTN, hypogonadism on chonic testosterone, Chrohn's dz/Ulcerative proctitis, CAD s/p stent to the proximal/mid LAD in 2007, and HLD who presents to the ER today after an out of hospital cardiac arrest.  History taken from the medical record and discussion with staff as patient is unable to provide history and no family at bedside during my exam.  Reportedly, he was working out at [employer] when he suddenly collapsed.  EMS administered 3 shocks, 3 epis, and amio bolus en route.  Presumed rythm was VF.    Of note, he had angina in Sept 2022 and [facility] with multi-vessel disease.  He was recommended for CABG, but he declined at the time as his angina had resolved.    In the ER, lab work notable for INR 1.3, Cr 1.9, mildly elevated LFTs,  with negative initial troponin.  Echo fairly unremarkable.  He is intubated and sedated.  TTM in place and will change goal to 36.  Currently on Levo for vasopressor support through CVL placed by ER.      Hospital/ICU Course:  No notes on file     Past Medical History:   Diagnosis Date    Kidney stone        No past surgical history on file.    Review of patient's allergies indicates:  No Known Allergies    Family History       Problem Relation (Age of Onset)    Prostate cancer Father          Tobacco Use    Smoking status: Never    Smokeless tobacco: Never   Substance and Sexual Activity    Alcohol use: Never    Drug use: Never    Sexual activity: Not on file         Review of Systems   Unable to perform ROS: Intubated     Objective:     Vital Signs (Most Recent):  Temp: (!) 93.6  °F (34.2 °C) (10/28/23 1245)  Pulse: (!) 56 (10/28/23 1245)  Resp: 20 (10/28/23 1245)  BP: (!) 157/95 (10/28/23 1220)  SpO2: 99 % (10/28/23 1245) Vital Signs (24h Range):  Temp:  [93 °F (33.9 °C)-98.8 °F (37.1 °C)] 93.6 °F (34.2 °C)  Pulse:  [46-82] 56  Resp:  [0-32] 20  SpO2:  [94 %-100 %] 99 %  BP: ()/(43-95) 157/95     Weight: 112 kg (247 lb)  Body mass index is 30.87 kg/m².      Intake/Output Summary (Last 24 hours) at 10/28/2023 1254  Last data filed at 10/28/2023 1222  Gross per 24 hour   Intake --   Output 285 ml   Net -285 ml        Physical Exam  Vitals and nursing note reviewed.   Constitutional:       General: He is not in acute distress.     Comments: Intubated and sedated   HENT:      Head: Normocephalic and atraumatic.   Eyes:      General: No scleral icterus.     Conjunctiva/sclera: Conjunctivae normal.      Pupils: Pupils are equal, round, and reactive to light.   Cardiovascular:      Rate and Rhythm: Regular rhythm. Bradycardia present.      Pulses: Normal pulses.      Heart sounds: No murmur heard.  Pulmonary:      Effort: Pulmonary effort is normal. No respiratory distress.      Breath sounds: No wheezing, rhonchi or rales.   Abdominal:      General: Abdomen is flat. There is no distension.      Palpations: Abdomen is soft.      Tenderness: There is no abdominal tenderness.   Musculoskeletal:      Cervical back: Normal range of motion and neck supple. No rigidity or tenderness.      Right lower leg: No edema.      Left lower leg: No edema.   Skin:     Comments: Very tan          Vents:  Vent Mode: A/C (10/28/23 1131)  Ventilator Initiated: Yes (10/28/23 0938)  Set Rate: 20 BPM (10/28/23 1131)  Vt Set: 500 mL (10/28/23 1131)  PEEP/CPAP: 5 cmH20 (10/28/23 1131)  Oxygen Concentration (%): 40 (10/28/23 1245)  Peak Airway Pressure: 11 cmH20 (10/28/23 1131)  Plateau Pressure: 0 cmH20 (10/28/23 1131)  Total Ve: 12.3 L/m (10/28/23 1131)  Negative Inspiratory Force (cm H2O): 0 (10/28/23 1131)  F/VT  Ratio<105 (RSBI): (!) 54.9 (10/28/23 1131)    Lines/Drains/Airways       Central Venous Catheter Line  Duration             Percutaneous Central Line Insertion/Assessment - Triple Lumen  10/28/23 0950 Internal Jugular Right <1 day              Drain  Duration                  NG/OG Tube 10/28/23 0940 [place] sump 16 Fr. Center mouth <1 day         Urethral Catheter 10/28/23 1000 Latex 16 Fr. <1 day              Airway  Duration                  Airway - Non-Surgical 10/28/23 0900 Endotracheal Tube <1 day              Peripheral Intravenous Line  Duration                  Peripheral IV - Single Lumen 10/28/23 0915 20 G Left;Posterior Forearm <1 day         Peripheral IV - Single Lumen 10/28/23 0919 20 G Right Antecubital <1 day                    Significant Labs:    CBC/Anemia Profile:  Recent Labs   Lab 10/28/23  0914   WBC 12.60   HGB 16.9   HCT 53.3      MCV 94   RDW 13.3        Chemistries:  Recent Labs   Lab 10/28/23  0914      K 4.1      CO2 11*   BUN 27*   CREATININE 1.9*   CALCIUM 8.3*   ALBUMIN 4.0   PROT 6.8   BILITOT 0.5   ALKPHOS 56   *   *       All pertinent labs within the past 24 hours have been reviewed.    Significant Imaging:   I have reviewed all pertinent imaging results/findings within the past 24 hours.    Assessment/Plan:     Pulmonary  Acute hypercapnic respiratory failure  Patient with Hypercapnic Respiratory failure which is Acute.  he is not on home oxygen. Supplemental oxygen was provided and noted- Vent Mode: A/C  Oxygen Concentration (%):  [40-50] 40  Resp Rate Total:  [20 br/min-25 br/min] 20 br/min  Vt Set:  [500 mL] 500 mL  PEEP/CPAP:  [5 cmH20] 5 cmH20  Mean Airway Pressure:  [8.3 cdQ71-73 cmH20] 8.3 cmH20    .   Signs/symptoms of respiratory failure include- lethargy. Contributing diagnoses includes - cardiac arrest Labs and images were reviewed. Patient Has recent ABG, which has been reviewed. Will treat underlying causes and adjust management of  respiratory failure as follows - ventilator support    - intubated 10/28  - ABGs reviewed; continue PRN  - wean settings, as able  - vent bundle in place  - SAT when he completes TTM    Cardiac/Vascular  * Cardiac arrest  - presumed VT/VF arrest given 3 shocks, 3 epis, and amio bolus in the field  - TTM with goal 36 initially  - protocol in place  - amio drip in place  - Cardiology consulted  - Echo fairly unremarkable  - cardiac monitoring  - replete lytes PRN  - CT Head without acute findings initially  - trend troponins  - wean Levo for goal MAP > 65    Hyperlipidemia  Holding statin acutely  Can restart if LFTs improve    Primary hypertension  Holding meds acutely  - Can restart, as necessary    Coronary artery disease involving native coronary artery of native heart  - h/o PCI in 2007  - LHC in 2022 with multi-vessel disease and pt declined CABG  - Cardiology following  - trend troponins    Renal/  KAREN (acute kidney injury)  - Cr 1.9 at admission; b/l around 1.2  - likely 2/2 to arrest  - will monitor with resuscitation   - renally dose meds and avoid nephrotoxins    Endocrine  Hypogonadism in male  Holding testosterone replacement    GI  Ulcerative proctitis  Doesn't appear he takes anything regularly at home (mention of PRN sulfasalazine in some clinic notes)  Supportive Care       Critical Care Time: 50 minutes  Critical secondary to respiratory failure, infusion of high risk medications    Critical care was time spent personally by me on the following activities: development of treatment plan with patient or surrogate and bedside caregivers, discussions with consultants, evaluation of patient's response to treatment, examination of patient, ordering and performing treatments and interventions, ordering and review of laboratory studies, ordering and review of radiographic studies, pulse oximetry, re-evaluation of patient's condition. This critical care time did not overlap with that of any other provider  or involve time for any procedures.     Marc Tate MD  Critical Care Medicine  [facility] - Intensive Care [facility])

## 2023-10-28 NOTE — CONSULTS
[facility] - Intensive Care ([facility])  Adult Nutrition  Consult Note    SUMMARY     Recommendations  1. Recommend pt be Initiated onto EN when medically appropriate.   - Peptamen Intense VHP. Initiate at 10 mL/hr, advanced as tolerated. Aim for goal rate of 60 mL/hr.   - Add Beneprotein x6 packs/day (fwf before and after).   - Formula provides with Beneprotein: 2123.28 Kcal (100% EEN), 168 g Protein (100% EPN), 1209.6 mL free water.   - 150 mL fwf q 4 hrs (900 mL) or per MD/NP.   - FWF + Free water = 2109.6 mL/day (100% EFN).   - Check Mg, Na, K+, Phos, and Glu before and after initiation, correct as indicated.   2. Re-consult for updated TF recs if propofol is stopped, decreased, or increased.  3. Weigh twice weekly.    Goals:   1. Pt will be initiated onto EN within 24 hrs.   2. Pt will consume and tolerate >50% of EEN/EPN prior to RD follow up.  Nutrition Goal Status: new  Communication of RD Recs: other (comment) (POC: Sticky Note)    Assessment and Plan    Nutrition Problem  Inadequate PO intake    Related to (etiology):   Decreased ability to consume sufficient nutrition    Signs and Symptoms (as evidenced by):   NPO, Vent    Interventions(treatment strategy):  1. Enteral nutrition  2. Collaboration with other providers    Nutrition Diagnosis Status:   New       Malnutrition Assessment                                       Reason for Assessment    Reason For Assessment: consult, new tube feeding  Diagnosis: cardiac disease (Cardiac arrest)  Relevant Medical History: Cardiac arrest, CAD, KAREN, HTN, HLD, Acute hypercapnic respiratory failure  Interdisciplinary Rounds: did not attend  General Information Comments:   10/28: 67 y/o male admitted d/t active principal problem of Cardiac arrest. Pt currently in the ICU intubated (Total Ve: 10 L/M) and sedated (Propofol: 20.2 mL/hr). NFPE not currently appropriate. Pt currently charted to weigh 247 lb, BMI 30.87 (Obese). Pt LBM: 10/28. RD will continue to  "follow  Nutrition Discharge Planning: Cardiac    Wt Readings from Last 20 Encounters:   10/28/23 112 kg (246 lb 14.6 oz)   06/02/23 95.3 kg (210 lb 1.6 oz)   06/03/22 100.2 kg (220 lb 14.4 oz)   ]    Nutrition Risk Screen    Nutrition Risk Screen: no indicators present    Nutrition/Diet History    Spiritual, Cultural Beliefs, Worship Practices, Values that Affect Care:  (KRUPA)  Food Allergies: NKFA  Factors Affecting Nutritional Intake: NPO, on mechanical ventilation    Anthropometrics    Temp: 96.3 °F (35.7 °C)  Height: 6' 3" (190.5 cm)  Height (inches): 75 in  Weight Method: Bed Scale  Weight: 112 kg (246 lb 14.6 oz)  Weight (lb): 246.92 lb  Ideal Body Weight (IBW), Male: 196 lb  % Ideal Body Weight, Male (lb): 125.98 %  BMI (Calculated): 30.9  BMI Grade: 30 - 34.9- obesity - grade I       Lab/Procedures/Meds  BMP  Lab Results   Component Value Date     10/28/2023    K 4.1 10/28/2023     10/28/2023    CO2 11 (L) 10/28/2023    BUN 27 (H) 10/28/2023    CREATININE 1.9 (H) 10/28/2023    CALCIUM 8.3 (L) 10/28/2023    ANIONGAP 27 (H) 10/28/2023    EGFRNORACEVR 38 (A) 10/28/2023     Lab Results   Component Value Date    CALCIUM 8.3 (L) 10/28/2023     No results for input(s): "POCTGLUCOSE" in the last 24 hours.  Lab Results   Component Value Date     (H) 10/28/2023     (H) 10/28/2023    ALKPHOS 56 10/28/2023    BILITOT 0.5 10/28/2023       Pertinent Labs Reviewed: reviewed  Pertinent Medications Reviewed: reviewed  Scheduled Meds:   chlorhexidine  15 mL Mouth/Throat BID    enoxparin  40 mg Subcutaneous Daily    famotidine (PF)  20 mg Intravenous Q12H    mupirocin   Nasal BID     Continuous Infusions:   amiodarone in dextrose 5%      fentanyl 100 mcg/hr (10/28/23 1041)    NORepinephrine bitartrate-D5W 0.2 mcg/kg/min (10/28/23 1238)    propofoL 30 mcg/kg/min (10/28/23 1241)     PRN Meds:.calcium gluconate IVPB, calcium gluconate IVPB, calcium gluconate IVPB, influenza 65up-adj, magnesium sulfate " IVPB, magnesium sulfate IVPB, potassium chloride in water **AND** potassium chloride in water **AND** potassium chloride in water, sodium chloride 0.9%      Estimated/Assessed Needs    Weight Used For Calorie Calculations: 112 kg (246 lb 14.6 oz)  Energy Calorie Requirements (kcal): 2116 (Total Ve: 10 L/M)  Energy Need Method: [place] (modified)  Protein Requirements: 168-191 (1.5-1.7 g/kg per KAREN)  Weight Used For Protein Calculations: 112 kg (246 lb 14.6 oz)  Fluid Requirements (mL): 500 + total output     RDA Method (mL): 2116  CHO Requirement: 265      Nutrition Prescription Ordered    Current Diet Order: NPO    Evaluation of Received Nutrient/Fluid Intake    I/O: -285 Since Admit  Energy Calories Required: not meeting needs  Protein Required: not meeting needs  Fluid Required: not meeting needs  Tolerance: not tolerating  % Intake of Estimated Energy Needs: 0 - 25 %  % Meal Intake: NPO    Nutrition Risk    Level of Risk/Frequency of Follow-up: high (x2 weekly)       Monitor and Evaluation    Food and Nutrient Intake: energy intake, enteral nutrition intake  Food and Nutrient Adminstration: enteral and parenteral nutrition administration  Physical Activity and Function: factors affecting access to physical activity, nutrition-related ADLs and IADLs  Anthropometric Measurements: weight, body mass index  Biochemical Data, Medical Tests and Procedures: electrolyte and renal panel, glucose/endocrine profile, inflammatory profile, lipid profile  Nutrition-Focused Physical Findings: overall appearance, skin, extremities, muscles and bones       Nutrition Follow-Up    RD Follow-up?: Yes  Aj Busby, Registration Eligible, Provisional LDN

## 2023-10-28 NOTE — ED PROVIDER NOTES
SCRIBE #1 NOTE: I, Jeannie Ceballos, am scribing for, and in the presence of, Duran Muniz MD. I have scribed the entire note.       History     Chief Complaint   Patient presents with    Cardiac Arrest     Working out at [employer]. Collapsed. 3 shocks, 3 epis, given amio PTA.      Review of patient's allergies indicates:  No Known Allergies      History of Present Illness     HPI    10/28/2023, 9:10 AM  History obtained from the EMS      History of Present Illness: Carlin Isaac is a 68 y.o. male patient who presents to the Emergency Department for evaluation of cardiac arrest onset PTA. Pt was working out at a [employer] when he suddenly collapsed. EMS administered 3 shocks, 3 epis, and amio while on route. Symptoms are constant and moderate in severity. No mitigating or exacerbating factors reported. Associated sxs not reported. Other prior Tx not reported. No further complaints or concerns at this time.       Arrival mode: Ambulance    PCP: Eliot Ambriz MD        Past Medical History:  Past Medical History:   Diagnosis Date    Kidney stone        Past Surgical History:  No past surgical history on file.      Family History:  Family History   Problem Relation Age of Onset    Prostate cancer Father        Social History:  Social History     Tobacco Use    Smoking status: Never    Smokeless tobacco: Never   Substance and Sexual Activity    Alcohol use: Never    Drug use: Never    Sexual activity: Not on file        Review of Systems     Review of Systems   Reason unable to perform ROS: intubated.  s/p cardiac arrest.        Physical Exam     Initial Vitals   BP Pulse Resp Temp SpO2   10/28/23 0910 10/28/23 0910 10/28/23 0910 10/28/23 0930 10/28/23 0910   (!) 112/58 (!) 59 20 98.8 °F (37.1 °C) 99 %      MAP       --                 Physical Exam  Nursing Notes and Vital Signs Reviewed.  Constitutional: Patient is in severe acute distress. Well-developed and well-nourished.  Head:  Atraumatic. Normocephalic.  Eyes:Conjunctivae are not pale. No scleral icterus.  ENT: Mucous membranes are moist. ETT in place  Neck: Supple. No lymphadenopathy.  Cardiovascular: Regular rate. Regular rhythm. No murmurs, rubs, or gallops. Distal pulses are 2+ and symmetric.  Pulmonary/Chest: Clear to auscultation bilaterally when bagged.  No wheezing or rales.   Abdominal: Soft and non-distended.    Skin: Warm and dry.  Neurological:  episodes of decorticate posturing       ED Course   Central Line    Date/Time: 10/28/2023 9:50 AM    Performed by: Duran Muniz MD  Authorized by: Duran Muniz MD    Location procedure was performed:  [facility] EMERGENCY DEPARTMENT  Indications:  Med administration  Preparation:  Skin prepped with ChloraPrep  Location:  Right internal jugular  Catheter size:  7 Fr  Inserted Catheter Length (cm):  16  Manometry: No    Complications: No    Specimens: No    Implants: No    Critical Care    Date/Time: 10/28/2023 11:44 AM    Performed by: Duran Muniz MD  Authorized by: Duran Muniz MD  Direct patient critical care time: 45 minutes  Additional history critical care time: 15 minutes  Ordering / reviewing critical care time: 12 minutes  Documentation critical care time: 10 minutes  Consulting other physicians critical care time: 15 minutes  Total critical care time (exclusive of procedural time) : 97 minutes  Critical care was necessary to treat or prevent imminent or life-threatening deterioration of the following conditions: cardiac failure and shock.        ED Vital Signs:  Vitals:    10/28/23 0932 10/28/23 0933 10/28/23 0938 10/28/23 1010   BP: 101/66  108/68    Pulse: 68 65 62    Resp: 20 20 20 (!) 26   Temp:       TempSrc:       SpO2: 99% 100% 100%    Weight:        10/28/23 1024 10/28/23 1029 10/28/23 1035 10/28/23 1038   BP: (!) 69/43 (!) 76/51 (!) 84/51    Pulse: (!) 53 (!) 48 (!) 46 78   Resp: 20 20 20    Temp:       TempSrc:       SpO2: 98% 98% 98%    Weight:         10/28/23 1042 10/28/23 1115 10/28/23 1125 10/28/23 1131   BP: 108/61 125/66 132/82 109/76   Pulse: 76 82 80 80   Resp: 20 (!) 32 (!) 26 (!) 28   Temp:       TempSrc:       SpO2: 99% 95% 95% (!) 94%   Weight:        10/28/23 1135 10/28/23 1140 10/28/23 1145   BP: (!) 84/50 121/60 126/73   Pulse: 73 66 (!) 57   Resp: (!) 26 (!) 21 20   Temp:      TempSrc:      SpO2: 98% 100% 99%   Weight:          Abnormal Lab Results:  Labs Reviewed   CBC W/ AUTO DIFFERENTIAL - Abnormal; Notable for the following components:       Result Value    MCHC 31.7 (*)     Immature Granulocytes 5.6 (*)     Immature Grans (Abs) 0.70 (*)     All other components within normal limits   COMPREHENSIVE METABOLIC PANEL - Abnormal; Notable for the following components:    CO2 11 (*)     Glucose 198 (*)     BUN 27 (*)     Creatinine 1.9 (*)     Calcium 8.3 (*)      (*)      (*)     eGFR 38 (*)     Anion Gap 27 (*)     All other components within normal limits   URINALYSIS, REFLEX TO URINE CULTURE - Abnormal; Notable for the following components:    Protein, UA Trace (*)     Occult Blood UA 1+ (*)     All other components within normal limits    Narrative:     Specimen Source->Urine   CK - Abnormal; Notable for the following components:     (*)     All other components within normal limits   FIBRINOGEN - Abnormal; Notable for the following components:    Fibrinogen 179 (*)     All other components within normal limits   PROTIME-INR - Abnormal; Notable for the following components:    Prothrombin Time 14.0 (*)     INR 1.3 (*)     All other components within normal limits   URINALYSIS MICROSCOPIC - Abnormal; Notable for the following components:    RBC, UA 6 (*)     All other components within normal limits    Narrative:     Specimen Source->Urine   ISTAT PROCEDURE - Abnormal; Notable for the following components:    POC PH 7.249 (*)     POC PCO2 45.7 (*)     POC PO2 413 (*)     POC HCO3 20.0 (*)     POC BE -7 (*)     All  other components within normal limits   ISTAT PROCEDURE - Abnormal; Notable for the following components:    POC PH 7.188 (*)     POC PCO2 57.2 (*)     POC PO2 77 (*)     POC HCO3 21.7 (*)     POC BE -6 (*)     All other components within normal limits   CULTURE, BLOOD   CULTURE, BLOOD   TROPONIN I   APTT   B-TYPE NATRIURETIC PEPTIDE        All Lab Results:  Results for orders placed or performed during the hospital encounter of 10/28/23   CBC Auto Differential   Result Value Ref Range    WBC 12.60 3.90 - 12.70 K/uL    RBC 5.68 4.60 - 6.20 M/uL    Hemoglobin 16.9 14.0 - 18.0 g/dL    Hematocrit 53.3 40.0 - 54.0 %    MCV 94 82 - 98 fL    MCH 29.8 27.0 - 31.0 pg    MCHC 31.7 (L) 32.0 - 36.0 g/dL    RDW 13.3 11.5 - 14.5 %    Platelets 250 150 - 450 K/uL    MPV 10.3 9.2 - 12.9 fL    Immature Granulocytes 5.6 (H) 0.0 - 0.5 %    Gran # (ANC) 6.9 1.8 - 7.7 K/uL    Immature Grans (Abs) 0.70 (H) 0.00 - 0.04 K/uL    Lymph # 3.9 1.0 - 4.8 K/uL    Mono # 0.8 0.3 - 1.0 K/uL    Eos # 0.2 0.0 - 0.5 K/uL    Baso # 0.10 0.00 - 0.20 K/uL    nRBC 0 0 /100 WBC    Gran % 55.0 38.0 - 73.0 %    Lymph % 31.0 18.0 - 48.0 %    Mono % 6.3 4.0 - 15.0 %    Eosinophil % 1.3 0.0 - 8.0 %    Basophil % 0.8 0.0 - 1.9 %    Differential Method Automated    Comprehensive Metabolic Panel   Result Value Ref Range    Sodium 140 136 - 145 mmol/L    Potassium 4.1 3.5 - 5.1 mmol/L    Chloride 102 95 - 110 mmol/L    CO2 11 (L) 23 - 29 mmol/L    Glucose 198 (H) 70 - 110 mg/dL    BUN 27 (H) 8 - 23 mg/dL    Creatinine 1.9 (H) 0.5 - 1.4 mg/dL    Calcium 8.3 (L) 8.7 - 10.5 mg/dL    Total Protein 6.8 6.0 - 8.4 g/dL    Albumin 4.0 3.5 - 5.2 g/dL    Total Bilirubin 0.5 0.1 - 1.0 mg/dL    Alkaline Phosphatase 56 55 - 135 U/L     (H) 10 - 40 U/L     (H) 10 - 44 U/L    eGFR 38 (A) >60 mL/min/1.73 m^2    Anion Gap 27 (H) 8 - 16 mmol/L   Urinalysis, Reflex to Urine Culture Urine, Clean Catch    Specimen: Urine   Result Value Ref Range    Specimen UA Urine,  Clean Catch     Color, UA Yellow Yellow, Straw, Damaris    Appearance, UA Clear Clear    pH, UA 7.0 5.0 - 8.0    Specific Gravity, UA 1.025 1.005 - 1.030    Protein, UA Trace (A) Negative    Glucose, UA Negative Negative    Ketones, UA Negative Negative    Bilirubin (UA) Negative Negative    Occult Blood UA 1+ (A) Negative    Nitrite, UA Negative Negative    Urobilinogen, UA Negative <2.0 EU/dL    Leukocytes, UA Negative Negative   CK   Result Value Ref Range     (H) 20 - 200 U/L   Troponin I   Result Value Ref Range    Troponin I 0.020 0.000 - 0.026 ng/mL   Fibrinogen   Result Value Ref Range    Fibrinogen 179 (L) 182 - 400 mg/dL   APTT   Result Value Ref Range    aPTT 23.0 21.0 - 32.0 sec   Protime-INR   Result Value Ref Range    Prothrombin Time 14.0 (H) 9.0 - 12.5 sec    INR 1.3 (H) 0.8 - 1.2   Urinalysis Microscopic   Result Value Ref Range    RBC, UA 6 (H) 0 - 4 /hpf    WBC, UA 3 0 - 5 /hpf    Bacteria Rare None-Occ /hpf    Unclass Marycruz UA Rare None-Moderate    Microscopic Comment SEE COMMENT    ISTAT PROCEDURE   Result Value Ref Range    POC PH 7.249 (LL) 7.35 - 7.45    POC PCO2 45.7 (H) 35 - 45 mmHg    POC PO2 413 (H) 80 - 100 mmHg    POC HCO3 20.0 (L) 24 - 28 mmol/L    POC BE -7 (L) -2 to 2 mmol/L    POC SATURATED O2 100 95 - 100 %    Rate 18     Sample ARTERIAL     Site RR     Allens Test Pass     DelSys Adult Vent     Mode AC/PRVC     Vt 450     PEEP 5     FiO2 100     Sp02 100    ISTAT PROCEDURE   Result Value Ref Range    POC PH 7.188 (LL) 7.35 - 7.45    POC PCO2 57.2 (HH) 35 - 45 mmHg    POC PO2 77 (L) 80 - 100 mmHg    POC HCO3 21.7 (L) 24 - 28 mmol/L    POC BE -6 (L) -2 to 2 mmol/L    POC SATURATED O2 91 95 - 100 %    Rate 20     Sample ARTERIAL     Site RR     Allens Test Pass     DelSys Adult Vent     Mode AC/PRVC     Vt 500     PEEP 5     FiO2 40            Imaging Results:  Imaging Results              CT Head Without Contrast (Final result)  Result time 10/28/23 11:24:10      Final result by  Amaya Jo MD (Timothy) (10/28/23 11:24:10)                   Impression:      No acute intracranial abnormality.    All CT scans at this facility use dose modulation, iterative reconstructions, and/or weight base dosing when appropriate to reduce radiation dose to as low as reasonably achievable.      Electronically signed by: Amaya Jo MD  Date:    10/28/2023  Time:    11:24               Narrative:    EXAMINATION:  CT HEAD WITHOUT CONTRAST    CLINICAL HISTORY:  Syncope, recurrent;    TECHNIQUE:  Noncontrast images were obtained.    COMPARISON:  None    FINDINGS:  Motion artifact.  No intracranial acute hemorrhage or acute focal brain parenchymal abnormality is identified.  Calvarium is intact.                                       X-Ray Chest AP Portable (Final result)  Result time 10/28/23 09:57:03      Final result by Amaya Jo MD (Timothy) (10/28/23 09:57:03)                   Impression:      Right central line in good position.  Clear lungs.  No pneumothorax      Electronically signed by: Amaya Jo MD  Date:    10/28/2023  Time:    09:57               Narrative:    EXAMINATION:  XR CHEST AP PORTABLE    CLINICAL HISTORY:  <Diagnosis>, central line placement;    COMPARISON:  Chest, 10/28/2023    FINDINGS:  One view.  Endotracheal tube nasogastric tube in good position..  Placement of a right central line with tip terminating at the SVC level.  No pneumothorax.  Stable heart size with clear lungs.                                       X-Ray Chest AP Portable (Final result)  Result time 10/28/23 09:31:18      Final result by Amaya Jo MD (Timothy) (10/28/23 09:31:18)                   Impression:      Endotracheal tube and nasogastric tube in good position.  Lungs appear clear.      Electronically signed by: Amaya Jo MD  Date:    10/28/2023  Time:    09:31               Narrative:    EXAMINATION:  XR CHEST AP PORTABLE    CLINICAL HISTORY:  , Cardiac arrest;    FINDINGS:  No  prior studies.  One view.  Endotracheal tube and nasogastric tube in good position. Support devices are present overlying the chest limiting evaluation. The heart is within upper limits of normal in size. Lungs appear relatively clear.                                       The EKG was ordered, reviewed, and independently interpreted by the ED provider.  Interpretation time: 09:10  Rate: 72 BPM  Rhythm: atrial fibrillation  Interpretation: Rightward axis. Marked ST abnormality, possible lateral subendocardial injury. No STEMI.             The Emergency Provider reviewed the vital signs and test results, which are outlined above.     ED Discussion       11:47 AM: Discussed case with Dr. Tate (ICU). Dr. Tate agrees with current care and management of pt and accepts admission.   Admitting Service: ICU  Admitting Physician: Dr. Tate  Admit to: ICU         Medical Decision Making  Working out at [facility], when he collapsed.  Found to be in vfib.  Shocked three times and ROSC  DDx: vfib, NSTEMI, electrolyte abnormality    Problems Addressed:  Cardiac arrest: acute illness or injury  Ventricular fibrillation: acute illness or injury    Amount and/or Complexity of Data Reviewed  Labs: ordered.  Radiology: ordered.    Risk  Decision regarding hospitalization.                ED Medication(s):  Medications   propofol (DIPRIVAN) 10 mg/mL infusion (30 mcg/kg/min × 112.3 kg Intravenous Rate/Dose Change 10/28/23 1024)   fentaNYL 2500 mcg in 0.9% sodium chloride 250 mL infusion premix (titrating) (100 mcg/hr Intravenous New Bag 10/28/23 1041)   NORepinephrine 4 mg in dextrose 5% 250 mL infusion (premix) (0.2 mcg/kg/min × 112.3 kg Intravenous Rate/Dose Change 10/28/23 1030)   amiodarone 360 mg/200 mL (1.8 mg/mL) infusion (1 mg/min Intravenous New Bag 10/28/23 0925)   sodium chloride 0.9% bolus 3,369 mL 3,369 mL (0 mLs Intravenous Stopped 10/28/23 1123)   FOSphenytoin (CEREBYX) 1,000 mg PE in dextrose 5 % (D5W) 100  mL IVPB (0 mg PE Intravenous Stopped 10/28/23 1014)   cisatracurium (NIMBEX) 200 mg in dextrose 5 % (D5W) 100 mL infusion (3 mcg/kg/min × 112.3 kg Intravenous New Bag 10/28/23 1125)   fentaNYL 50 mcg/mL injection 200 mcg (200 mcg Intravenous Given 10/28/23 1010)   atropine injection 1 mg ( Intravenous Back Association 10/28/23 1045)       New Prescriptions    No medications on file               Scribe Attestation:   Scribe #1: I performed the above scribed service and the documentation accurately describes the services I performed. I attest to the accuracy of the note.     Attending:   Physician Attestation Statement for Scribe #1: I, Duran Muniz MD, personally performed the services described in this documentation, as scribed by Jeannei Ceballos, in my presence, and it is both accurate and complete.           Clinical Impression       ICD-10-CM ICD-9-CM   1. Ventricular fibrillation  I49.01 427.41   2. Cardiac arrest  I46.9 427.5   3. VT (ventricular tachycardia)  I47.20 427.1       Disposition:   Disposition: Admitted  Condition: Critical        Duran Muniz MD  10/28/23 1143

## 2023-10-28 NOTE — PROGRESS NOTES
With addition of dopamine, improved HR but also increased cardiac irritability.  Cards and MD Tate made aware.  Titrated down levo, per MD Tate, put propofol back to 30. Md at bedside now to place fem art line

## 2023-10-28 NOTE — ASSESSMENT & PLAN NOTE
- h/o PCI in 2007  - [facility] in 2022 with multi-vessel disease and pt declined CABG  - Cardiology following  - trend troponins

## 2023-10-29 PROBLEM — M62.82 NON-TRAUMATIC RHABDOMYOLYSIS: Status: ACTIVE | Noted: 2023-10-29

## 2023-10-29 LAB
ALBUMIN SERPL BCP-MCNC: 3.5 G/DL (ref 3.5–5.2)
ALLENS TEST: ABNORMAL
ALP SERPL-CCNC: 46 U/L (ref 55–135)
ALT SERPL W/O P-5'-P-CCNC: 168 U/L (ref 10–44)
ANION GAP SERPL CALC-SCNC: 10 MMOL/L (ref 8–16)
ANION GAP SERPL CALC-SCNC: 12 MMOL/L (ref 8–16)
ANION GAP SERPL CALC-SCNC: 14 MMOL/L (ref 8–16)
AST SERPL-CCNC: 190 U/L (ref 10–40)
BASOPHILS # BLD AUTO: 0.04 K/UL (ref 0–0.2)
BASOPHILS NFR BLD: 0.3 % (ref 0–1.9)
BILIRUB DIRECT SERPL-MCNC: 0.3 MG/DL (ref 0.1–0.3)
BILIRUB SERPL-MCNC: 0.7 MG/DL (ref 0.1–1)
BUN SERPL-MCNC: 21 MG/DL (ref 8–23)
BUN SERPL-MCNC: 25 MG/DL (ref 8–23)
BUN SERPL-MCNC: 30 MG/DL (ref 8–23)
CALCIUM SERPL-MCNC: 7.9 MG/DL (ref 8.7–10.5)
CALCIUM SERPL-MCNC: 8 MG/DL (ref 8.7–10.5)
CALCIUM SERPL-MCNC: 8.1 MG/DL (ref 8.7–10.5)
CHLORIDE SERPL-SCNC: 103 MMOL/L (ref 95–110)
CHLORIDE SERPL-SCNC: 108 MMOL/L (ref 95–110)
CHLORIDE SERPL-SCNC: 109 MMOL/L (ref 95–110)
CK SERPL-CCNC: 7796 U/L (ref 20–200)
CK SERPL-CCNC: 8977 U/L (ref 20–200)
CK SERPL-CCNC: ABNORMAL U/L (ref 20–200)
CO2 SERPL-SCNC: 20 MMOL/L (ref 23–29)
CO2 SERPL-SCNC: 22 MMOL/L (ref 23–29)
CO2 SERPL-SCNC: 23 MMOL/L (ref 23–29)
CREAT SERPL-MCNC: 1.1 MG/DL (ref 0.5–1.4)
CREAT SERPL-MCNC: 1.2 MG/DL (ref 0.5–1.4)
CREAT SERPL-MCNC: 1.4 MG/DL (ref 0.5–1.4)
DELSYS: ABNORMAL
DIFFERENTIAL METHOD: ABNORMAL
EOSINOPHIL # BLD AUTO: 0 K/UL (ref 0–0.5)
EOSINOPHIL NFR BLD: 0.1 % (ref 0–8)
ERYTHROCYTE [DISTWIDTH] IN BLOOD BY AUTOMATED COUNT: 13.2 % (ref 11.5–14.5)
ERYTHROCYTE [SEDIMENTATION RATE] IN BLOOD BY WESTERGREN METHOD: 20 MM/H
EST. GFR  (NO RACE VARIABLE): 55 ML/MIN/1.73 M^2
EST. GFR  (NO RACE VARIABLE): >60 ML/MIN/1.73 M^2
EST. GFR  (NO RACE VARIABLE): >60 ML/MIN/1.73 M^2
FIO2: 30
GLUCOSE SERPL-MCNC: 115 MG/DL (ref 70–110)
GLUCOSE SERPL-MCNC: 118 MG/DL (ref 70–110)
GLUCOSE SERPL-MCNC: 86 MG/DL (ref 70–110)
HCO3 UR-SCNC: 22.2 MMOL/L (ref 24–28)
HCT VFR BLD AUTO: 49.7 % (ref 40–54)
HGB BLD-MCNC: 16.7 G/DL (ref 14–18)
IMM GRANULOCYTES # BLD AUTO: 0.07 K/UL (ref 0–0.04)
IMM GRANULOCYTES NFR BLD AUTO: 0.5 % (ref 0–0.5)
INR PPP: 1.2 (ref 0.8–1.2)
LACTATE SERPL-SCNC: 0.9 MMOL/L (ref 0.5–2.2)
LYMPHOCYTES # BLD AUTO: 1.6 K/UL (ref 1–4.8)
LYMPHOCYTES NFR BLD: 11.1 % (ref 18–48)
MAGNESIUM SERPL-MCNC: 1.8 MG/DL (ref 1.6–2.6)
MAGNESIUM SERPL-MCNC: 2 MG/DL (ref 1.6–2.6)
MAGNESIUM SERPL-MCNC: 2.3 MG/DL (ref 1.6–2.6)
MCH RBC QN AUTO: 29.6 PG (ref 27–31)
MCHC RBC AUTO-ENTMCNC: 33.6 G/DL (ref 32–36)
MCV RBC AUTO: 88 FL (ref 82–98)
MODE: ABNORMAL
MONOCYTES # BLD AUTO: 1.4 K/UL (ref 0.3–1)
MONOCYTES NFR BLD: 9.7 % (ref 4–15)
NEUTROPHILS # BLD AUTO: 11.3 K/UL (ref 1.8–7.7)
NEUTROPHILS NFR BLD: 78.3 % (ref 38–73)
NRBC BLD-RTO: 0 /100 WBC
OSMOLALITY SERPL: 298 MOSM/KG (ref 280–300)
OSMOLALITY UR: 164 MOSM/KG (ref 50–1200)
PCO2 BLDA: 34.7 MMHG (ref 35–45)
PEEP: 5
PH SMN: 7.41 [PH] (ref 7.35–7.45)
PHOSPHATE SERPL-MCNC: 2.5 MG/DL (ref 2.7–4.5)
PHOSPHATE SERPL-MCNC: 3.3 MG/DL (ref 2.7–4.5)
PHOSPHATE SERPL-MCNC: 5 MG/DL (ref 2.7–4.5)
PLATELET # BLD AUTO: 190 K/UL (ref 150–450)
PMV BLD AUTO: 9.8 FL (ref 9.2–12.9)
PO2 BLDA: 103 MMHG (ref 80–100)
POC BE: -2 MMOL/L
POC SATURATED O2: 98 % (ref 95–100)
POTASSIUM SERPL-SCNC: 3.8 MMOL/L (ref 3.5–5.1)
POTASSIUM SERPL-SCNC: 3.9 MMOL/L (ref 3.5–5.1)
POTASSIUM SERPL-SCNC: 4.8 MMOL/L (ref 3.5–5.1)
PROT SERPL-MCNC: 5.9 G/DL (ref 6–8.4)
PROTHROMBIN TIME: 12.4 SEC (ref 9–12.5)
RBC # BLD AUTO: 5.65 M/UL (ref 4.6–6.2)
SAMPLE: ABNORMAL
SITE: ABNORMAL
SODIUM SERPL-SCNC: 139 MMOL/L (ref 136–145)
SODIUM SERPL-SCNC: 141 MMOL/L (ref 136–145)
SODIUM SERPL-SCNC: 141 MMOL/L (ref 136–145)
TROPONIN I SERPL DL<=0.01 NG/ML-MCNC: 3.44 NG/ML (ref 0–0.03)
TROPONIN I SERPL DL<=0.01 NG/ML-MCNC: 4.46 NG/ML (ref 0–0.03)
VT: 500
WBC # BLD AUTO: 14.36 K/UL (ref 3.9–12.7)

## 2023-10-29 PROCEDURE — 20000000 HC ICU ROOM

## 2023-10-29 PROCEDURE — 99233 SBSQ HOSP IP/OBS HIGH 50: CPT | Mod: ,,, | Performed by: INTERNAL MEDICINE

## 2023-10-29 PROCEDURE — 99900035 HC TECH TIME PER 15 MIN (STAT)

## 2023-10-29 PROCEDURE — 37799 UNLISTED PX VASCULAR SURGERY: CPT

## 2023-10-29 PROCEDURE — 80048 BASIC METABOLIC PNL TOTAL CA: CPT | Mod: 91 | Performed by: INTERNAL MEDICINE

## 2023-10-29 PROCEDURE — 99233 PR SUBSEQUENT HOSPITAL CARE,LEVL III: ICD-10-PCS | Mod: ,,, | Performed by: INTERNAL MEDICINE

## 2023-10-29 PROCEDURE — 84100 ASSAY OF PHOSPHORUS: CPT | Mod: 91 | Performed by: INTERNAL MEDICINE

## 2023-10-29 PROCEDURE — 63600175 PHARM REV CODE 636 W HCPCS: Performed by: NURSE PRACTITIONER

## 2023-10-29 PROCEDURE — 94761 N-INVAS EAR/PLS OXIMETRY MLT: CPT

## 2023-10-29 PROCEDURE — 82803 BLOOD GASES ANY COMBINATION: CPT

## 2023-10-29 PROCEDURE — 83605 ASSAY OF LACTIC ACID: CPT | Performed by: INTERNAL MEDICINE

## 2023-10-29 PROCEDURE — 27100171 HC OXYGEN HIGH FLOW UP TO 24 HOURS

## 2023-10-29 PROCEDURE — 85610 PROTHROMBIN TIME: CPT | Performed by: INTERNAL MEDICINE

## 2023-10-29 PROCEDURE — 83930 ASSAY OF BLOOD OSMOLALITY: CPT | Performed by: NURSE PRACTITIONER

## 2023-10-29 PROCEDURE — 63600175 PHARM REV CODE 636 W HCPCS: Performed by: INTERNAL MEDICINE

## 2023-10-29 PROCEDURE — 63600175 PHARM REV CODE 636 W HCPCS: Performed by: EMERGENCY MEDICINE

## 2023-10-29 PROCEDURE — 25000003 PHARM REV CODE 250: Performed by: EMERGENCY MEDICINE

## 2023-10-29 PROCEDURE — 84484 ASSAY OF TROPONIN QUANT: CPT | Performed by: INTERNAL MEDICINE

## 2023-10-29 PROCEDURE — 25000003 PHARM REV CODE 250: Performed by: NURSE PRACTITIONER

## 2023-10-29 PROCEDURE — 94003 VENT MGMT INPAT SUBQ DAY: CPT

## 2023-10-29 PROCEDURE — 83735 ASSAY OF MAGNESIUM: CPT | Mod: 91 | Performed by: INTERNAL MEDICINE

## 2023-10-29 PROCEDURE — 83935 ASSAY OF URINE OSMOLALITY: CPT | Performed by: NURSE PRACTITIONER

## 2023-10-29 PROCEDURE — 25000003 PHARM REV CODE 250: Performed by: INTERNAL MEDICINE

## 2023-10-29 PROCEDURE — 85025 COMPLETE CBC W/AUTO DIFF WBC: CPT | Performed by: INTERNAL MEDICINE

## 2023-10-29 PROCEDURE — 99900026 HC AIRWAY MAINTENANCE (STAT)

## 2023-10-29 PROCEDURE — 63600175 PHARM REV CODE 636 W HCPCS

## 2023-10-29 PROCEDURE — 82550 ASSAY OF CK (CPK): CPT | Performed by: INTERNAL MEDICINE

## 2023-10-29 PROCEDURE — 80076 HEPATIC FUNCTION PANEL: CPT | Performed by: INTERNAL MEDICINE

## 2023-10-29 RX ORDER — NOREPINEPHRINE BITARTRATE/D5W 4MG/250ML
0-3 PLASTIC BAG, INJECTION (ML) INTRAVENOUS CONTINUOUS
Status: DISCONTINUED | OUTPATIENT
Start: 2023-10-29 | End: 2023-10-30

## 2023-10-29 RX ORDER — FUROSEMIDE 10 MG/ML
40 INJECTION INTRAMUSCULAR; INTRAVENOUS ONCE
Status: COMPLETED | OUTPATIENT
Start: 2023-10-29 | End: 2023-10-29

## 2023-10-29 RX ORDER — LIDOCAINE HYDROCHLORIDE ANHYDROUS AND DEXTROSE MONOHYDRATE .8; 5 G/100ML; G/100ML
2 INJECTION, SOLUTION INTRAVENOUS CONTINUOUS
Status: DISCONTINUED | OUTPATIENT
Start: 2023-10-29 | End: 2023-10-29 | Stop reason: SDUPTHER

## 2023-10-29 RX ORDER — LIDOCAINE HYDROCHLORIDE ANHYDROUS AND DEXTROSE MONOHYDRATE .8; 5 G/100ML; G/100ML
2 INJECTION, SOLUTION INTRAVENOUS CONTINUOUS
Status: DISCONTINUED | OUTPATIENT
Start: 2023-10-29 | End: 2023-10-30

## 2023-10-29 RX ORDER — ACETAMINOPHEN 650 MG/20.3ML
650 LIQUID ORAL ONCE
Status: COMPLETED | OUTPATIENT
Start: 2023-10-29 | End: 2023-10-29

## 2023-10-29 RX ORDER — LIDOCAINE HYDROCHLORIDE 20 MG/ML
1 INJECTION INTRAVENOUS ONCE
Status: DISCONTINUED | OUTPATIENT
Start: 2023-10-29 | End: 2023-10-30

## 2023-10-29 RX ORDER — MAGNESIUM SULFATE HEPTAHYDRATE 500 MG/ML
1 INJECTION, SOLUTION INTRAMUSCULAR; INTRAVENOUS ONCE
Status: COMPLETED | OUTPATIENT
Start: 2023-10-29 | End: 2023-10-29

## 2023-10-29 RX ADMIN — AMIODARONE HYDROCHLORIDE 0.5 MG/MIN: 1.8 INJECTION, SOLUTION INTRAVENOUS at 05:10

## 2023-10-29 RX ADMIN — ACETAMINOPHEN 650 MG: 650 SOLUTION ORAL at 09:10

## 2023-10-29 RX ADMIN — FUROSEMIDE 40 MG: 10 INJECTION, SOLUTION INTRAMUSCULAR; INTRAVENOUS at 09:10

## 2023-10-29 RX ADMIN — DOPAMINE HYDROCHLORIDE 7.5 MCG/KG/MIN: 160 INJECTION, SOLUTION INTRAVENOUS at 12:10

## 2023-10-29 RX ADMIN — MAGNESIUM SULFATE HEPTAHYDRATE 2 G: 40 INJECTION, SOLUTION INTRAVENOUS at 01:10

## 2023-10-29 RX ADMIN — SODIUM CHLORIDE, POTASSIUM CHLORIDE, SODIUM LACTATE AND CALCIUM CHLORIDE: 600; 310; 30; 20 INJECTION, SOLUTION INTRAVENOUS at 01:10

## 2023-10-29 RX ADMIN — BUSPIRONE HYDROCHLORIDE 5 MG: 5 TABLET ORAL at 09:10

## 2023-10-29 RX ADMIN — PROPOFOL 35 MCG/KG/MIN: 10 INJECTION, EMULSION INTRAVENOUS at 11:10

## 2023-10-29 RX ADMIN — CHLORHEXIDINE GLUCONATE 0.12% ORAL RINSE 15 ML: 1.2 LIQUID ORAL at 08:10

## 2023-10-29 RX ADMIN — CHLORHEXIDINE GLUCONATE 0.12% ORAL RINSE 15 ML: 1.2 LIQUID ORAL at 09:10

## 2023-10-29 RX ADMIN — MUPIROCIN: 20 OINTMENT TOPICAL at 08:10

## 2023-10-29 RX ADMIN — AMIODARONE HYDROCHLORIDE 0.5 MG/MIN: 1.8 INJECTION, SOLUTION INTRAVENOUS at 04:10

## 2023-10-29 RX ADMIN — DOPAMINE HYDROCHLORIDE 10 MCG/KG/MIN: 160 INJECTION, SOLUTION INTRAVENOUS at 07:10

## 2023-10-29 RX ADMIN — BUSPIRONE HYDROCHLORIDE 5 MG: 5 TABLET ORAL at 12:10

## 2023-10-29 RX ADMIN — FAMOTIDINE 20 MG: 10 INJECTION, SOLUTION INTRAVENOUS at 08:10

## 2023-10-29 RX ADMIN — Medication 200 MCG/HR: at 04:10

## 2023-10-29 RX ADMIN — NOREPINEPHRINE BITARTRATE 0.24 MCG/KG/MIN: 4 INJECTION, SOLUTION INTRAVENOUS at 09:10

## 2023-10-29 RX ADMIN — PROPOFOL 18 MCG/KG/MIN: 10 INJECTION, EMULSION INTRAVENOUS at 04:10

## 2023-10-29 RX ADMIN — FAMOTIDINE 20 MG: 10 INJECTION, SOLUTION INTRAVENOUS at 09:10

## 2023-10-29 RX ADMIN — PROPOFOL 20 MCG/KG/MIN: 10 INJECTION, EMULSION INTRAVENOUS at 05:10

## 2023-10-29 RX ADMIN — DOPAMINE HYDROCHLORIDE 10 MCG/KG/MIN: 160 INJECTION, SOLUTION INTRAVENOUS at 01:10

## 2023-10-29 RX ADMIN — MAGNESIUM SULFATE HEPTAHYDRATE 1 G: 500 INJECTION, SOLUTION INTRAMUSCULAR; INTRAVENOUS at 09:10

## 2023-10-29 RX ADMIN — Medication 200 MCG/HR: at 02:10

## 2023-10-29 RX ADMIN — MUPIROCIN: 20 OINTMENT TOPICAL at 09:10

## 2023-10-29 RX ADMIN — AMIODARONE HYDROCHLORIDE 1 MG/MIN: 1.8 INJECTION, SOLUTION INTRAVENOUS at 09:10

## 2023-10-29 RX ADMIN — SODIUM CHLORIDE, POTASSIUM CHLORIDE, SODIUM LACTATE AND CALCIUM CHLORIDE: 600; 310; 30; 20 INJECTION, SOLUTION INTRAVENOUS at 04:10

## 2023-10-29 RX ADMIN — AMIODARONE HYDROCHLORIDE 150 MG: 1.5 INJECTION, SOLUTION INTRAVENOUS at 09:10

## 2023-10-29 RX ADMIN — DOPAMINE HYDROCHLORIDE 10 MCG/KG/MIN: 160 INJECTION, SOLUTION INTRAVENOUS at 08:10

## 2023-10-29 RX ADMIN — ENOXAPARIN SODIUM 40 MG: 40 INJECTION SUBCUTANEOUS at 04:10

## 2023-10-29 RX ADMIN — BUSPIRONE HYDROCHLORIDE 5 MG: 5 TABLET ORAL at 08:10

## 2023-10-29 RX ADMIN — PROPOFOL 20 MCG/KG/MIN: 10 INJECTION, EMULSION INTRAVENOUS at 11:10

## 2023-10-29 NOTE — ASSESSMENT & PLAN NOTE
- Cr 1.9 at admission; b/l around 1.2  - likely 2/2 to arrest and shock  - improving with resuscitation   - robust UOP  - renally dose meds and avoid nephrotoxins

## 2023-10-29 NOTE — SUBJECTIVE & OBJECTIVE
Interval History:  Supportive care post cardiac arrest and MI    Review of Systems   Unable to perform ROS: Acuity of condition     Objective:     Vital Signs (Most Recent):  Temp: 97.7 °F (36.5 °C) (10/29/23 1500)  Pulse: (!) 46 (10/29/23 1500)  Resp: 20 (10/29/23 1500)  BP: 125/60 (10/29/23 1500)  SpO2: 100 % (10/29/23 1500) Vital Signs (24h Range):  Temp:  [93.6 °F (34.2 °C)-97.7 °F (36.5 °C)] 97.7 °F (36.5 °C)  Pulse:  [35-89] 46  Resp:  [10-44] 20  SpO2:  [87 %-100 %] 100 %  BP: ()/(49-67) 125/60  Arterial Line BP: ()/(48-69) 114/50     Weight: 112 kg (246 lb 14.6 oz)  Body mass index is 30.86 kg/m².     SpO2: 100 %         Intake/Output Summary (Last 24 hours) at 10/29/2023 1550  Last data filed at 10/29/2023 1500  Gross per 24 hour   Intake 4423.32 ml   Output 5393 ml   Net -969.68 ml       Lines/Drains/Airways       Central Venous Catheter Line  Duration             Percutaneous Central Line Insertion/Assessment - Triple Lumen  10/28/23 0950 Internal Jugular Right 1 day              Drain  Duration                  NG/OG Tube 10/28/23 0940 Arthur sump 16 Fr. Center mouth 1 day         Urethral Catheter 10/28/23 1318 Temperature probe 1 day              Airway  Duration                  Airway - Non-Surgical 10/28/23 0900 Endotracheal Tube 1 day              Arterial Line  Duration             Arterial Line 10/28/23 1830 Left Femoral <1 day              Peripheral Intravenous Line  Duration                  Peripheral IV - Single Lumen 10/28/23 0915 20 G Left;Posterior Forearm 1 day                       Physical Exam  Eyes:      Pupils: Pupils are equal, round, and reactive to light.   Neck:      Trachea: No tracheal deviation.   Cardiovascular:      Rate and Rhythm: Normal rate and regular rhythm.      Pulses: Intact distal pulses.           Carotid pulses are 2+ on the right side and 2+ on the left side.       Radial pulses are 2+ on the right side and 2+ on the left side.        Femoral pulses  are 2+ on the right side and 2+ on the left side.       Popliteal pulses are 2+ on the right side and 2+ on the left side.        Dorsalis pedis pulses are 2+ on the right side and 2+ on the left side.        Posterior tibial pulses are 2+ on the right side and 2+ on the left side.      Heart sounds: Normal heart sounds. No murmur heard.     No friction rub. No gallop.   Pulmonary:      Effort: Pulmonary effort is normal. No respiratory distress.      Breath sounds: Normal breath sounds. No stridor. No wheezing or rales.   Chest:      Chest wall: No tenderness.   Abdominal:      General: There is no distension.      Tenderness: There is no abdominal tenderness. There is no rebound.   Musculoskeletal:         General: No tenderness.   Skin:     General: Skin is warm and dry.            Significant Labs: CMP   Recent Labs   Lab 10/28/23  0914 10/28/23  1303 10/28/23  1613 10/28/23  2348 10/29/23  0808     --  138 139 141   K 4.1  --  5.1 4.8 3.9     --  105 103 108   CO2 11*  --  21* 22* 23   *   < > 148*  149* 86 118*   BUN 27*  --  30* 30* 25*   CREATININE 1.9*  --  1.5* 1.4 1.2   CALCIUM 8.3*  --  7.5* 8.1* 8.0*   PROT 6.8  --   --   --  5.9*   ALBUMIN 4.0  --   --   --  3.5   BILITOT 0.5  --   --   --  0.7   ALKPHOS 56  --   --   --  46*   *  --   --   --  190*   *  --   --   --  168*   ANIONGAP 27*  --  12 14 10    < > = values in this interval not displayed.   , CBC   Recent Labs   Lab 10/28/23  0914 10/29/23  0410   WBC 12.60 14.36*   HGB 16.9 16.7   HCT 53.3 49.7    190   , and Troponin   Recent Labs   Lab 10/28/23  1613 10/28/23  2348 10/29/23  0410   TROPONINI 6.192* 4.463* 3.445*       Significant Imaging: Echocardiogram: Transthoracic echo (TTE) complete (Cupid Only):   Results for orders placed or performed during the hospital encounter of 10/28/23   Echo   Result Value Ref Range    BSA 2.43 m2    LVOT stroke volume 48.16 cm3    LVIDd 5.35 3.5 - 6.0 cm    LV  Systolic Volume 62.38 mL    LV Systolic Volume Index 26.0 mL/m2    LVIDs 3.81 2.1 - 4.0 cm    LV Diastolic Volume 138.32 mL    LV Diastolic Volume Index 57.63 mL/m2    IVS 1.09 0.6 - 1.1 cm    LVOT diameter 1.94 cm    LVOT area 3.0 cm2    FS 29 28 - 44 %    Left Ventricle Relative Wall Thickness 0.42 cm    Posterior Wall 1.13 (A) 0.6 - 1.1 cm    LV mass 234.12 g    LV Mass Index 98 g/m2    MV Peak E Dima 0.88 m/s    TDI LATERAL 0.09 m/s    TDI SEPTAL 0.11 m/s    E/E' ratio 8.80 m/s    MV Peak A Dima 0.43 m/s    TR Max Dima 2.89 m/s    E/A ratio 2.05     IVRT 64.70 msec    E wave deceleration time 189.74 msec    LV SEPTAL E/E' RATIO 8.00 m/s    LV LATERAL E/E' RATIO 9.78 m/s    LVOT peak dima 0.77 m/s    Left Ventricular Outflow Tract Mean Velocity 0.60 cm/s    Left Ventricular Outflow Tract Mean Gradient 1.52 mmHg    LA size 4.30 cm    Left Atrium Minor Axis 6.05 cm    Left Atrium Major Axis 6.14 cm    RVDD 2.69 cm    RVOT peak VTI 13.7 cm    TAPSE 2.09 cm    RA Major Axis 5.53 cm    AV mean gradient 3 mmHg    AV peak gradient 4 mmHg    Ao peak dima 1.06 m/s    Ao VTI 20.60 cm    LVOT peak VTI 16.30 cm    AV valve area 2.34 cm²    AV Velocity Ratio 0.73     AV index (prosthetic) 0.79     ARMANDO by Velocity Ratio 2.15 cm²    Mr max dima 4.82 m/s    MV stenosis pressure 1/2 time 55.02 ms    MV valve area p 1/2 method 4.00 cm2    TV mean gradient 29 mmHg    Triscuspid Valve Regurgitation Peak Gradient 33 mmHg    PV mean gradient 1 mmHg    RVOT peak dima 0.66 m/s    Ao root annulus 3.05 cm    STJ 2.96 cm    Ascending aorta 2.86 cm    IVC diameter 2.68 cm    Mean e' 0.10 m/s    ZLVIDS -4.28     ZLVIDD -7.15     LA Volume Index 41.8 mL/m2    LA volume 100.24 cm3    LA WIDTH 4.5 cm    RA Width 4.0 cm    TV resting pulmonary artery pressure 36 mmHg    RV TB RVSP 6 mmHg    Est. RA pres 3 mmHg    Narrative      Left Ventricle: The left ventricle is normal in size. Normal wall   thickness. Normal wall motion. There is low normal  systolic function with   a visually estimated ejection fraction of 50 - 55%. There is normal   diastolic function.    Right Ventricle: Normal right ventricular cavity size. Wall thickness   is normal. Right ventricle wall motion  is normal. Systolic function is   normal.    Mitral Valve: There is mild regurgitation with a centrally directed   jet.    Tricuspid Valve: There is mild regurgitation with a centrally directed   jet.    Pulmonic Valve: There is mild regurgitation with a centrally directed   jet.    Pulmonary Artery: The estimated pulmonary artery systolic pressure is   36 mmHg.    IVC/SVC: Normal venous pressure at 3 mmHg.        no

## 2023-10-29 NOTE — PLAN OF CARE
[facility] - Intensive Care (Hospital)  Initial Discharge Assessment       Primary Care Provider: Eliot Ambriz MD    Admission Diagnosis: Ventricular fibrillation [I49.01]  Cardiac arrest [I46.9]  VT (ventricular tachycardia) [I47.20]    Admission Date: 10/28/2023  Expected Discharge Date:     Transition of Care Barriers: None    Payor: [employer] MEDICARE / Plan: [employer] 65 / Product Type: Medicare Advantage /     Extended Emergency Contact Information  Primary Emergency Contact: Demi Sahni  Mobile Phone: [phone]  Relation: Significant other   needed? No            No Pharmacies Listed    Initial Assessment (most recent)       Adult Discharge Assessment - 10/29/23 1113          Discharge Assessment    Assessment Type Discharge Planning Assessment     Confirmed/corrected address, phone number and insurance Yes     Confirmed Demographics Correct on Facesheet     Source of Information family     Does patient/caregiver understand observation status Yes     Communicated ANGELI with patient/caregiver Date not available/Unable to determine     Reason For Admission Ventricular fibrillation     People in Home significant other     Do you expect to return to your current living situation? Yes     Do you have help at home or someone to help you manage your care at home? Yes     Who are your caregiver(s) and their phone number(s)? Demi Bora, [phone]     Prior to hospitilization cognitive status: Alert/Oriented     Current cognitive status: Alert/Oriented     Equipment Currently Used at Home none     Readmission within 30 days? No     Patient currently being followed by outpatient case management? No     Do you currently have service(s) that help you manage your care at home? No     Do you take prescription medications? Yes     Do you have prescription coverage? Yes     Do you have any problems affording any of your prescribed medications? No     Who is going to help  you get home at discharge? Demi Sahni, [phone]     How do you get to doctors appointments? car, drives self     Are you on dialysis? No     Do you take coumadin? No     DME Needed Upon Discharge  none     Discharge Plan discussed with: Spouse/sig other     Name(s) and Number(s) Demi Bora, [phone]     Transition of Care Barriers None

## 2023-10-29 NOTE — ASSESSMENT & PLAN NOTE
- CK continues to climb despite downtrend in troponin  - likely 2/2 to shivering and straining patient does at times  - continue sedation  - continue IVF  - monitor  - renal fxn stable and robust UOP

## 2023-10-29 NOTE — PROGRESS NOTES
[facility] ([facility])  Cardiology  Progress Note    Patient Name: Carlin Isaac  MRN: [id number]  Admission Date: 10/28/2023  Hospital Length of Stay: 1 days  Code Status: Full Code   Attending Physician: Marc Tate MD   Primary Care Physician: Eliot Ambriz MD  Expected Discharge Date:   Principal Problem:Cardiac arrest    Subjective:     Hospital Course:   10/29/2023: The patient is supported intubated stable.  Cardiac echo shows normal LV function troponin levels are elevated EKG shows ST segments have reverted back to normal sinus rhythm with normal intervals and no evidence of further ST changes.  Putting the wife the patient has significant coronary disease and had refused bypass surgery last year.  Will plan another heart catheterization after he is wakeful and extubated.  Clinically stable this time with supportive care.      Interval History:  Supportive care post cardiac arrest and MI    Review of Systems   Unable to perform ROS: Acuity of condition     Objective:     Vital Signs (Most Recent):  Temp: 97.7 °F (36.5 °C) (10/29/23 1500)  Pulse: (!) 46 (10/29/23 1500)  Resp: 20 (10/29/23 1500)  BP: 125/60 (10/29/23 1500)  SpO2: 100 % (10/29/23 1500) Vital Signs (24h Range):  Temp:  [93.6 °F (34.2 °C)-97.7 °F (36.5 °C)] 97.7 °F (36.5 °C)  Pulse:  [35-89] 46  Resp:  [10-44] 20  SpO2:  [87 %-100 %] 100 %  BP: ()/(49-67) 125/60  Arterial Line BP: ()/(48-69) 114/50     Weight: 112 kg (246 lb 14.6 oz)  Body mass index is 30.86 kg/m².     SpO2: 100 %         Intake/Output Summary (Last 24 hours) at 10/29/2023 1550  Last data filed at 10/29/2023 1500  Gross per 24 hour   Intake 4423.32 ml   Output 5393 ml   Net -969.68 ml       Lines/Drains/Airways       Central Venous Catheter Line  Duration             Percutaneous Central Line Insertion/Assessment - Triple Lumen  10/28/23 0950 Internal Jugular Right 1 day              Drain  Duration                  NG/OG Tube 10/28/23  0940 Brayton sump 16 Fr. Center mouth 1 day         Urethral Catheter 10/28/23 1318 Temperature probe 1 day              Airway  Duration                  Airway - Non-Surgical 10/28/23 0900 Endotracheal Tube 1 day              Arterial Line  Duration             Arterial Line 10/28/23 1830 Left Femoral <1 day              Peripheral Intravenous Line  Duration                  Peripheral IV - Single Lumen 10/28/23 0915 20 G Left;Posterior Forearm 1 day                       Physical Exam  Eyes:      Pupils: Pupils are equal, round, and reactive to light.   Neck:      Trachea: No tracheal deviation.   Cardiovascular:      Rate and Rhythm: Normal rate and regular rhythm.      Pulses: Intact distal pulses.           Carotid pulses are 2+ on the right side and 2+ on the left side.       Radial pulses are 2+ on the right side and 2+ on the left side.        Femoral pulses are 2+ on the right side and 2+ on the left side.       Popliteal pulses are 2+ on the right side and 2+ on the left side.        Dorsalis pedis pulses are 2+ on the right side and 2+ on the left side.        Posterior tibial pulses are 2+ on the right side and 2+ on the left side.      Heart sounds: Normal heart sounds. No murmur heard.     No friction rub. No gallop.   Pulmonary:      Effort: Pulmonary effort is normal. No respiratory distress.      Breath sounds: Normal breath sounds. No stridor. No wheezing or rales.   Chest:      Chest wall: No tenderness.   Abdominal:      General: There is no distension.      Tenderness: There is no abdominal tenderness. There is no rebound.   Musculoskeletal:         General: No tenderness.   Skin:     General: Skin is warm and dry.            Significant Labs: CMP   Recent Labs   Lab 10/28/23  0914 10/28/23  1303 10/28/23  1613 10/28/23  2348 10/29/23  0808     --  138 139 141   K 4.1  --  5.1 4.8 3.9     --  105 103 108   CO2 11*  --  21* 22* 23   *   < > 148*  149* 86 118*   BUN 27*  --  30*  30* 25*   CREATININE 1.9*  --  1.5* 1.4 1.2   CALCIUM 8.3*  --  7.5* 8.1* 8.0*   PROT 6.8  --   --   --  5.9*   ALBUMIN 4.0  --   --   --  3.5   BILITOT 0.5  --   --   --  0.7   ALKPHOS 56  --   --   --  46*   *  --   --   --  190*   *  --   --   --  168*   ANIONGAP 27*  --  12 14 10    < > = values in this interval not displayed.   , CBC   Recent Labs   Lab 10/28/23  0914 10/29/23  0410   WBC 12.60 14.36*   HGB 16.9 16.7   HCT 53.3 49.7    190   , and Troponin   Recent Labs   Lab 10/28/23  1613 10/28/23  2348 10/29/23  0410   TROPONINI 6.192* 4.463* 3.445*       Significant Imaging: Echocardiogram: Transthoracic echo (TTE) complete (Cupid Only):   Results for orders placed or performed during the hospital encounter of 10/28/23   Echo   Result Value Ref Range    BSA 2.43 m2    LVOT stroke volume 48.16 cm3    LVIDd 5.35 3.5 - 6.0 cm    LV Systolic Volume 62.38 mL    LV Systolic Volume Index 26.0 mL/m2    LVIDs 3.81 2.1 - 4.0 cm    LV Diastolic Volume 138.32 mL    LV Diastolic Volume Index 57.63 mL/m2    IVS 1.09 0.6 - 1.1 cm    LVOT diameter 1.94 cm    LVOT area 3.0 cm2    FS 29 28 - 44 %    Left Ventricle Relative Wall Thickness 0.42 cm    Posterior Wall 1.13 (A) 0.6 - 1.1 cm    LV mass 234.12 g    LV Mass Index 98 g/m2    MV Peak E Dima 0.88 m/s    TDI LATERAL 0.09 m/s    TDI SEPTAL 0.11 m/s    E/E' ratio 8.80 m/s    MV Peak A Dima 0.43 m/s    TR Max Dima 2.89 m/s    E/A ratio 2.05     IVRT 64.70 msec    E wave deceleration time 189.74 msec    LV SEPTAL E/E' RATIO 8.00 m/s    LV LATERAL E/E' RATIO 9.78 m/s    LVOT peak dima 0.77 m/s    Left Ventricular Outflow Tract Mean Velocity 0.60 cm/s    Left Ventricular Outflow Tract Mean Gradient 1.52 mmHg    LA size 4.30 cm    Left Atrium Minor Axis 6.05 cm    Left Atrium Major Axis 6.14 cm    RVDD 2.69 cm    RVOT peak VTI 13.7 cm    TAPSE 2.09 cm    RA Major Axis 5.53 cm    AV mean gradient 3 mmHg    AV peak gradient 4 mmHg    Ao peak dima 1.06 m/s    Ao  VTI 20.60 cm    LVOT peak VTI 16.30 cm    AV valve area 2.34 cm²    AV Velocity Ratio 0.73     AV index (prosthetic) 0.79     ARMANDO by Velocity Ratio 2.15 cm²    Mr max jannie 4.82 m/s    MV stenosis pressure 1/2 time 55.02 ms    MV valve area p 1/2 method 4.00 cm2    TV mean gradient 29 mmHg    Triscuspid Valve Regurgitation Peak Gradient 33 mmHg    PV mean gradient 1 mmHg    RVOT peak jannie 0.66 m/s    Ao root annulus 3.05 cm    STJ 2.96 cm    Ascending aorta 2.86 cm    IVC diameter 2.68 cm    Mean e' 0.10 m/s    ZLVIDS -4.28     ZLVIDD -7.15     LA Volume Index 41.8 mL/m2    LA volume 100.24 cm3    LA WIDTH 4.5 cm    RA Width 4.0 cm    TV resting pulmonary artery pressure 36 mmHg    RV TB RVSP 6 mmHg    Est. RA pres 3 mmHg    Narrative      Left Ventricle: The left ventricle is normal in size. Normal wall   thickness. Normal wall motion. There is low normal systolic function with   a visually estimated ejection fraction of 50 - 55%. There is normal   diastolic function.    Right Ventricle: Normal right ventricular cavity size. Wall thickness   is normal. Right ventricle wall motion  is normal. Systolic function is   normal.    Mitral Valve: There is mild regurgitation with a centrally directed   jet.    Tricuspid Valve: There is mild regurgitation with a centrally directed   jet.    Pulmonic Valve: There is mild regurgitation with a centrally directed   jet.    Pulmonary Artery: The estimated pulmonary artery systolic pressure is   36 mmHg.    IVC/SVC: Normal venous pressure at 3 mmHg.       Assessment and Plan:     Brief HPI:  Continue supportive care discussed with the wife possible heart catheterization in the future.    * Cardiac arrest  Will continue supportive care  Continue amiodarone  Continue enoxaparin  Add BB if needed    Coronary artery disease involving native coronary artery of native heart  Significant CAD, EKG shows anterior and lateral ischemia  Plan nitrates if possible  1 st troponin negative  and will trend  Likely cardiac event was VT  Cardiac echo shows  Preserved LV function        VTE Risk Mitigation (From admission, onward)         Ordered     enoxaparin injection 40 mg  Daily         10/28/23 1229     IP VTE HIGH RISK PATIENT  Once         10/28/23 1229     Place sequential compression device  Until discontinued         10/28/23 1229                Héctor Graham MD  Cardiology  [facility] - Intensive Care ([facility])

## 2023-10-29 NOTE — PROCEDURES
DATE: 10/28/23    EEG NUMBER: [id number]    REFERRING PHYSICIAN:  Dr. Tate      This EEG was performed to assess for subclinical seizures      ELECTROENCEPHALOGRAM REPORT     METHODOLOGY:  Electroencephalographic (EEG) recording is with electrodes placed according to the International 10-20 placement system.  Thirty two (32) channels of digital signal are simultaneously recorded from the scalp and may include EKG, EMG, and/or eye monitors.   Recording band pass was 0.1 to 512 hz.  Digital video recording of the patient is simultaneously recorded with the EEG.  The nursing staff report clinical symptoms and may press an event button when the patient has symptoms of clinical interest to the treating physicians.  EEG and video recording is stored and archived in digital format.  The entire recording is visually reviewed, and the times identified by computer analysis as being spikes or seizures are reviewed again.  Activation procedures which include photic stimulation, hyperventilation and instructing patients to perform simple task are done in selected patients.   Compresses spectral analysis (CSA) is also performed on the activity recorded from each individual channel.  This is displayed as a power display of frequencies from 0 to 30 Hz over time.   The CSA analysis is done and displayed continuously.  This is reviewed for asymmetries in power between homologous areas of the scalp and for presence of changes in power which can be seen when seizures occur.  Sections of suspected abnormalities on the CSA is then compared with the original EEG recording.                ViRTUAL INTERACTiVE software was also utilized in the review of this study.  This software suite analyzes the EEG recording in multiple domains.  Coherence and rhythmicity is computed to identify EEG sections which may contain organized seizures.  Each channel undergoes analysis to detect presence of spike and sharp waves which have special and morphological  characteristic of epileptic activity.  The routine EEG recording is converted from spacial into frequency domain.  This is then displayed comparing homologous areas to identify areas of significant asymmetry.  Algorithm to identify non-cortically generated artifact is used to separate eye movement, EMG and other artifact from the EEG.     EEG FINDINGS:  The recording was obtained with a number of standard bipolar and referential montages during obtunded state.  Diffuse disorganized low amplitude mixed theta and alpha with occasional delta  range slowing was noted which was symmetric.  The background was intermixed with symmetric spindles.  Intermittent photic stimulation failed to alter the background.  Variability and reactivity were noted.  There were no interictal epileptiform abnormalities and no clinical or electrographic seizures were recorded.    The EKG channel revealed a sinus rhythm.     IMPRESSION:  This is an abnormal EEG during obtunded state.  Diffuse disorganized low-amplitude slowing of the background was noted     CLINICAL CORRELATION:  The patient is a 68-year-old male with a history of cardiac arrest who is currently maintained on Dilantin.  This is an abnormal EEG during obtunded state.  The overall degree of disorganization and slowing for given age is suggestive of a moderate to severe encephalopathy, nonspecific to the cause .There is no evidence of an epileptic process on this recording.  No seizures were recorded during this study.

## 2023-10-29 NOTE — ASSESSMENT & PLAN NOTE
- h/o PCI in 2007  - [facility] in 2022 with multi-vessel disease and pt declined CABG  - Cardiology following  - troponin peaked at 6.192 prior to downtrending

## 2023-10-29 NOTE — PROGRESS NOTES
[facility] - Intensive Care ([facility])  Critical Care Medicine  Progress Note    Patient Name: Carlin Isaac  MRN: [id number]  Admission Date: 10/28/2023  Hospital Length of Stay: 1 days  Code Status: Full Code  Attending Provider: Marc Tate MD  Primary Care Provider: Eliot Ambriz MD   Principal Problem: Cardiac arrest    Subjective:     HPI:  Mr Isaac is a 67 y/o man with HTN, hypogonadism on chonic testosterone, Chrohn's dz/Ulcerative proctitis, CAD s/p stent to the proximal/mid LAD in 2007, and HLD who presents to the ER today after an out of hospital cardiac arrest.  History taken from the medical record and discussion with staff as patient is unable to provide history and no family at bedside during my exam.  Reportedly, he was working out at [employer] when he suddenly collapsed.  EMS administered 3 shocks, 3 epis, and amio bolus en route.  Presumed rythm was VF.    Of note, he had angina in Sept 2022 and [place] with multi-vessel disease.  He was recommended for CABG, but he declined at the time as his angina had resolved.    In the ER, lab work notable for INR 1.3, Cr 1.9, mildly elevated LFTs,  with negative initial troponin.  Echo fairly unremarkable.  He is intubated and sedated.  TTM in place and will change goal to 36.  Currently on Levo for vasopressor support through CVL placed by ER.      Hospital/ICU Course:  No notes on file    No new subjective & objective note has been filed under this hospital service since the last note was generated.      ABG  Recent Labs   Lab 10/29/23  0305   PH 7.413   PO2 103*   PCO2 34.7*   HCO3 22.2*   BE -2     Assessment/Plan:     Pulmonary  Acute hypercapnic respiratory failure  Patient with Hypercapnic Respiratory failure which is Acute.  he is not on home oxygen. Supplemental oxygen was provided and noted- Vent Mode: A/C  Oxygen Concentration (%):  [30-50] 30  Resp Rate Total:  [20 br/min-29 br/min] 20 br/min  Vt Set:  [500 mL] 500  mL  PEEP/CPAP:  [5 cmH20] 5 cmH20  Mean Airway Pressure:  [8.2 vbO37-32 cmH20] 9.4 cmH20    .   Signs/symptoms of respiratory failure include- lethargy. Contributing diagnoses includes - cardiac arrest Labs and images were reviewed. Patient Has recent ABG, which has been reviewed. Will treat underlying causes and adjust management of respiratory failure as follows - ventilator support    - intubated 10/28  - ABGs reviewed; continue PRN  - wean settings, as able  - vent bundle in place  - SAT when he completes TTM    Cardiac/Vascular  * Cardiac arrest  - presumed VT/VF arrest given 3 shocks, 3 epis, and amio bolus in the field  - TTM with goal 36 initially; rewarming phase today  - protocol in place  - amio drip in place  - Cardiology consulted  - Echo fairly unremarkable  - cardiac monitoring  - replete lytes PRN  - CT Head without acute findings initially  - Troponins downtrending  - Levo changed to dopamine given bradycardia; wean as tolerated    Hyperlipidemia  Holding statin acutely  Can restart if LFTs improve    Primary hypertension  Holding meds acutely  - Can restart, as necessary    Coronary artery disease involving native coronary artery of native heart  - h/o PCI in 2007  - C in 2022 with multi-vessel disease and pt declined CABG  - Cardiology following  - troponin peaked at 6.192 prior to downtrending    Renal/  KAREN (acute kidney injury)  - Cr 1.9 at admission; b/l around 1.2  - likely 2/2 to arrest and shock  - improving with resuscitation   - robust UOP  - renally dose meds and avoid nephrotoxins    Endocrine  Hypogonadism in male  Holding testosterone replacement    GI  Ulcerative proctitis  Doesn't appear he takes anything regularly at home (mention of PRN sulfasalazine in some clinic notes)  Supportive Care    Orthopedic  Non-traumatic rhabdomyolysis  - CK continues to climb despite downtrend in troponin  - likely 2/2 to shivering and straining patient does at times  - continue sedation  -  continue IVF  - monitor  - renal fxn stable and robust UOP      Critical Care Time: 38 minutes  Critical secondary to respiratory failure, infusion of high risk medications      Critical care was time spent personally by me on the following activities: development of treatment plan with patient or surrogate and bedside caregivers, discussions with consultants, evaluation of patient's response to treatment, examination of patient, ordering and performing treatments and interventions, ordering and review of laboratory studies, ordering and review of radiographic studies, pulse oximetry, re-evaluation of patient's condition. This critical care time did not overlap with that of any other provider or involve time for any procedures.     Marc Tate MD  Critical Care Medicine  [facility] - Intensive Care ([facility])

## 2023-10-29 NOTE — PLAN OF CARE
Problem: Adult Inpatient Plan of Care  Goal: Plan of Care Review  Outcome: Ongoing, Progressing  Pt remains intubated and sedated to RASS -3. Will awaken to voice/gentle stimulation and track with eyes. TTM in progress, maintaining temp at 36C. Pt bradycardic on monitor, multiple rhythm changes overnight but not sustained (NP aware). BP labile, dopamine titrated per orders. Central line infusing Dopamine, Amio, prop, fent, and LR. 75-120ml/hr urine output via ayala cath until around 0200, urine output significantly increased to 325-600ml.hr (NP notified). Bmx1 this shift. BSWR in place for pt safety. POC reviewed with significant other at bedside, verbalized understanding. Will continue with current POC and update as needed.

## 2023-10-29 NOTE — PLAN OF CARE
Poc reviewed c pt's SO at bedside.  Received from ED, goal temp changed and paralytic d/c'd. Progressive worsening bradycardia despite dec in sedatives and stopping of amio gtt.  Dopamine adjunct with increase of rate/rhythm irritability.  Cards notified, stat 12 lead ekg. 1gm Ca gluconate given, pt had increase in physical activity with md at bedside.  Seizure vs shiver vs tremor vs fasciculations?  Increased clenching and tone of bue.  One time ativan given, L fem art line placed.  Stat EEG ordered and prop increased in the meantime.  Dwindling uop, continuous ivf started.  ANDRES consult called.

## 2023-10-29 NOTE — PLAN OF CARE
Problem: Restraint, Nonbehavioral (Nonviolent)  Goal: Absence of Harm or Injury  Outcome: Ongoing, Progressing  Intervention: Protect Dignity, Rights, and Personal Wellbeing  Flowsheets (Taken 10/29/2023 1636)  Trust Relationship/Rapport:   care explained   reassurance provided     Problem: Adjustment to Illness (Targeted Temperature Management)  Goal: Optimal Response to Life-Threatening Event  Outcome: Ongoing, Progressing  Intervention: Support Family Response  Flowsheets (Taken 10/29/2023 1636)  Family/Support System Care:   caregiver stress acknowledged   involvement promoted   presence promoted     Problem: Body Temperature Regulation (Targeted Temperature Management)  Goal: Target Body Temperature Maintained  Outcome: Ongoing, Progressing  Intervention: Maintain Target Body Temperature  Flowsheets (Taken 10/29/2023 1636)  Pain Management Interventions:   pain management plan reviewed with patient/caregiver   position adjusted   relaxation techniques promoted   quiet environment facilitated  Thermoregulation Maintenance: cooling devices utilized     Problem: Dysrhythmia (Targeted Temperature Management)  Goal: Stable Cardiac Rate and Rhythm  Outcome: Ongoing, Progressing     Problem: Hemodynamic Instability (Targeted Temperature Management)  Goal: Effective Tissue Perfusion  Outcome: Ongoing, Progressing  Intervention: Optimize Blood Flow, Oxygenation and Ventilation  Flowsheets (Taken 10/29/2023 1636)  Bleeding Precautions: monitored for signs of bleeding  Fluid/Electrolyte Management: (labs monitored) other (see comments)     Problem: Infection (Targeted Temperature Management)  Goal: Absence of Infection Signs and Symptoms  Outcome: Ongoing, Progressing  Intervention: Prevent Infection  Flowsheets (Taken 10/29/2023 1636)  Infection Prevention:   environmental surveillance performed   equipment surfaces disinfected   hand hygiene promoted  Glycemic Management: blood glucose monitored     Problem: Fall  Injury Risk  Goal: Absence of Fall and Fall-Related Injury  Outcome: Ongoing, Progressing  Intervention: Identify and Manage Contributors  Flowsheets (Taken 10/29/2023 1650)  Self-Care Promotion: safe use of adaptive equipment encouraged  Medication Review/Management:   medications reviewed   high-risk medications identified  Intervention: Promote Injury-Free Environment  Flowsheets (Taken 10/29/2023 1650)  Safety Promotion/Fall Prevention: bed alarm set

## 2023-10-29 NOTE — HOSPITAL COURSE
10/29/2023: The patient is supported intubated stable.  Cardiac echo shows normal LV function troponin levels are elevated EKG shows ST segments have reverted back to normal sinus rhythm with normal intervals and no evidence of further ST changes.  Putting the wife the patient has significant coronary disease and had refused bypass surgery last year.  Will plan another heart catheterization after he is wakeful and extubated.  Clinically stable this time with supportive care.    10/30/23   Intubated, NSR, some nonsustained tachyrhythmia overnight.Pt on Levo, Amio,.     10/31/23 CP last night requiring tridil, troponin > 50, no CP on exam. Discussed cath with pt and agrees. Will schedule with Dr. Mathew    11/1/23   No CP, CABG tomorrow, impella in place, no CP    11/2/23  s/p 3V CABG, on post op vasopressors, tele NSR    11/3/23   POD #2, extubated, impella removed, tele monitor A paicng, epi weaned off    11/6/23-Patient seen and examined today, sitting up in bedside chair. Recovering well post-CABG. Ambulating with PT/OT. Pain controlled.     11/7/23-Patient seen and examined today, resting in bed. No AEON. Feels well. Labs reviewed. CTS to d/c home today.

## 2023-10-29 NOTE — ASSESSMENT & PLAN NOTE
Patient with Hypercapnic Respiratory failure which is Acute.  he is not on home oxygen. Supplemental oxygen was provided and noted- Vent Mode: A/C  Oxygen Concentration (%):  [30-50] 30  Resp Rate Total:  [20 br/min-29 br/min] 20 br/min  Vt Set:  [500 mL] 500 mL  PEEP/CPAP:  [5 cmH20] 5 cmH20  Mean Airway Pressure:  [8.2 duF15-72 cmH20] 9.4 cmH20    .   Signs/symptoms of respiratory failure include- lethargy. Contributing diagnoses includes - cardiac arrest Labs and images were reviewed. Patient Has recent ABG, which has been reviewed. Will treat underlying causes and adjust management of respiratory failure as follows - ventilator support    - intubated 10/28  - ABGs reviewed; continue PRN  - wean settings, as able  - vent bundle in place  - SAT when he completes TTM

## 2023-10-29 NOTE — ASSESSMENT & PLAN NOTE
- presumed VT/VF arrest given 3 shocks, 3 epis, and amio bolus in the field  - TTM with goal 36 initially; rewarming phase today  - protocol in place  - amio drip in place  - Cardiology consulted  - Echo fairly unremarkable  - cardiac monitoring  - replete lytes PRN  - CT Head without acute findings initially  - Troponins downtrending  - Levo changed to dopamine given bradycardia; wean as tolerated

## 2023-10-30 LAB
ALBUMIN SERPL BCP-MCNC: 2.9 G/DL (ref 3.5–5.2)
ALLENS TEST: ABNORMAL
ALP SERPL-CCNC: 51 U/L (ref 55–135)
ALT SERPL W/O P-5'-P-CCNC: 157 U/L (ref 10–44)
ANION GAP SERPL CALC-SCNC: 13 MMOL/L (ref 8–16)
ANION GAP SERPL CALC-SCNC: 15 MMOL/L (ref 8–16)
APTT PPP: 31 SEC (ref 21–32)
AST SERPL-CCNC: 193 U/L (ref 10–40)
BASOPHILS # BLD AUTO: 0.02 K/UL (ref 0–0.2)
BASOPHILS # BLD AUTO: 0.07 K/UL (ref 0–0.2)
BASOPHILS NFR BLD: 0.2 % (ref 0–1.9)
BASOPHILS NFR BLD: 0.7 % (ref 0–1.9)
BILIRUB SERPL-MCNC: 0.5 MG/DL (ref 0.1–1)
BUN SERPL-MCNC: 21 MG/DL (ref 8–23)
BUN SERPL-MCNC: 24 MG/DL (ref 8–23)
CALCIUM SERPL-MCNC: 7.7 MG/DL (ref 8.7–10.5)
CALCIUM SERPL-MCNC: 8 MG/DL (ref 8.7–10.5)
CHLORIDE SERPL-SCNC: 105 MMOL/L (ref 95–110)
CHLORIDE SERPL-SCNC: 106 MMOL/L (ref 95–110)
CK SERPL-CCNC: 6509 U/L (ref 20–200)
CO2 SERPL-SCNC: 19 MMOL/L (ref 23–29)
CO2 SERPL-SCNC: 22 MMOL/L (ref 23–29)
CREAT SERPL-MCNC: 1.3 MG/DL (ref 0.5–1.4)
CREAT SERPL-MCNC: 1.7 MG/DL (ref 0.5–1.4)
DELSYS: ABNORMAL
DIFFERENTIAL METHOD: ABNORMAL
DIFFERENTIAL METHOD: ABNORMAL
EOSINOPHIL # BLD AUTO: 0 K/UL (ref 0–0.5)
EOSINOPHIL # BLD AUTO: 0 K/UL (ref 0–0.5)
EOSINOPHIL NFR BLD: 0 % (ref 0–8)
EOSINOPHIL NFR BLD: 0.1 % (ref 0–8)
ERYTHROCYTE [DISTWIDTH] IN BLOOD BY AUTOMATED COUNT: 13.5 % (ref 11.5–14.5)
ERYTHROCYTE [DISTWIDTH] IN BLOOD BY AUTOMATED COUNT: 13.6 % (ref 11.5–14.5)
ERYTHROCYTE [SEDIMENTATION RATE] IN BLOOD BY WESTERGREN METHOD: 20 MM/H
EST. GFR  (NO RACE VARIABLE): 44 ML/MIN/1.73 M^2
EST. GFR  (NO RACE VARIABLE): >60 ML/MIN/1.73 M^2
FIO2: 50
GLUCOSE SERPL-MCNC: 124 MG/DL (ref 70–110)
GLUCOSE SERPL-MCNC: 134 MG/DL (ref 70–110)
HCO3 UR-SCNC: 22.8 MMOL/L (ref 24–28)
HCT VFR BLD AUTO: 43.9 % (ref 40–54)
HCT VFR BLD AUTO: 53 % (ref 40–54)
HGB BLD-MCNC: 14.7 G/DL (ref 14–18)
HGB BLD-MCNC: 17.9 G/DL (ref 14–18)
IMM GRANULOCYTES # BLD AUTO: 0.03 K/UL (ref 0–0.04)
IMM GRANULOCYTES # BLD AUTO: 0.04 K/UL (ref 0–0.04)
IMM GRANULOCYTES NFR BLD AUTO: 0.3 % (ref 0–0.5)
IMM GRANULOCYTES NFR BLD AUTO: 0.4 % (ref 0–0.5)
INR PPP: 1.3 (ref 0.8–1.2)
INR PPP: 1.4 (ref 0.8–1.2)
LYMPHOCYTES # BLD AUTO: 0.6 K/UL (ref 1–4.8)
LYMPHOCYTES # BLD AUTO: 0.9 K/UL (ref 1–4.8)
LYMPHOCYTES NFR BLD: 5.9 % (ref 18–48)
LYMPHOCYTES NFR BLD: 8.6 % (ref 18–48)
MAGNESIUM SERPL-MCNC: 1.9 MG/DL (ref 1.6–2.6)
MAGNESIUM SERPL-MCNC: 2 MG/DL (ref 1.6–2.6)
MAGNESIUM SERPL-MCNC: 2.1 MG/DL (ref 1.6–2.6)
MCH RBC QN AUTO: 29.6 PG (ref 27–31)
MCH RBC QN AUTO: 30.3 PG (ref 27–31)
MCHC RBC AUTO-ENTMCNC: 33.5 G/DL (ref 32–36)
MCHC RBC AUTO-ENTMCNC: 33.8 G/DL (ref 32–36)
MCV RBC AUTO: 89 FL (ref 82–98)
MCV RBC AUTO: 90 FL (ref 82–98)
MODE: ABNORMAL
MONOCYTES # BLD AUTO: 0.7 K/UL (ref 0.3–1)
MONOCYTES # BLD AUTO: 0.9 K/UL (ref 0.3–1)
MONOCYTES NFR BLD: 6.3 % (ref 4–15)
MONOCYTES NFR BLD: 8.3 % (ref 4–15)
NEUTROPHILS # BLD AUTO: 8.8 K/UL (ref 1.8–7.7)
NEUTROPHILS # BLD AUTO: 8.8 K/UL (ref 1.8–7.7)
NEUTROPHILS NFR BLD: 84 % (ref 38–73)
NEUTROPHILS NFR BLD: 85.2 % (ref 38–73)
NRBC BLD-RTO: 0 /100 WBC
NRBC BLD-RTO: 0 /100 WBC
PCO2 BLDA: 48.4 MMHG (ref 35–45)
PEEP: 8
PH SMN: 7.28 [PH] (ref 7.35–7.45)
PHOSPHATE SERPL-MCNC: 3.5 MG/DL (ref 2.7–4.5)
PHOSPHATE SERPL-MCNC: 3.7 MG/DL (ref 2.7–4.5)
PHOSPHATE SERPL-MCNC: 3.8 MG/DL (ref 2.7–4.5)
PLATELET # BLD AUTO: 144 K/UL (ref 150–450)
PLATELET # BLD AUTO: 248 K/UL (ref 150–450)
PLATELET BLD QL SMEAR: ABNORMAL
PMV BLD AUTO: 10.5 FL (ref 9.2–12.9)
PMV BLD AUTO: 10.6 FL (ref 9.2–12.9)
PO2 BLDA: 89 MMHG (ref 80–100)
POC BE: -4 MMOL/L
POC SATURATED O2: 96 % (ref 95–100)
POTASSIUM SERPL-SCNC: 4.2 MMOL/L (ref 3.5–5.1)
POTASSIUM SERPL-SCNC: 4.4 MMOL/L (ref 3.5–5.1)
PROT SERPL-MCNC: 5.8 G/DL (ref 6–8.4)
PROTHROMBIN TIME: 13.5 SEC (ref 9–12.5)
PROTHROMBIN TIME: 14.5 SEC (ref 9–12.5)
RBC # BLD AUTO: 4.96 M/UL (ref 4.6–6.2)
RBC # BLD AUTO: 5.9 M/UL (ref 4.6–6.2)
SAMPLE: ABNORMAL
SITE: ABNORMAL
SODIUM SERPL-SCNC: 140 MMOL/L (ref 136–145)
SODIUM SERPL-SCNC: 140 MMOL/L (ref 136–145)
TRIGL SERPL-MCNC: 151 MG/DL (ref 30–150)
TROPONIN I SERPL DL<=0.01 NG/ML-MCNC: 5.85 NG/ML (ref 0–0.03)
TROPONIN I SERPL DL<=0.01 NG/ML-MCNC: >50 NG/ML (ref 0–0.03)
VT: 500
WBC # BLD AUTO: 10.3 K/UL (ref 3.9–12.7)
WBC # BLD AUTO: 10.46 K/UL (ref 3.9–12.7)

## 2023-10-30 PROCEDURE — 82803 BLOOD GASES ANY COMBINATION: CPT

## 2023-10-30 PROCEDURE — 25000003 PHARM REV CODE 250: Performed by: INTERNAL MEDICINE

## 2023-10-30 PROCEDURE — 99900035 HC TECH TIME PER 15 MIN (STAT)

## 2023-10-30 PROCEDURE — 84484 ASSAY OF TROPONIN QUANT: CPT | Mod: 91 | Performed by: NURSE PRACTITIONER

## 2023-10-30 PROCEDURE — 85025 COMPLETE CBC W/AUTO DIFF WBC: CPT | Mod: 91 | Performed by: NURSE PRACTITIONER

## 2023-10-30 PROCEDURE — 82550 ASSAY OF CK (CPK): CPT | Performed by: NURSE PRACTITIONER

## 2023-10-30 PROCEDURE — 20000000 HC ICU ROOM

## 2023-10-30 PROCEDURE — 85610 PROTHROMBIN TIME: CPT | Performed by: INTERNAL MEDICINE

## 2023-10-30 PROCEDURE — 25000003 PHARM REV CODE 250: Performed by: NURSE PRACTITIONER

## 2023-10-30 PROCEDURE — 25000242 PHARM REV CODE 250 ALT 637 W/ HCPCS: Performed by: NURSE PRACTITIONER

## 2023-10-30 PROCEDURE — 84478 ASSAY OF TRIGLYCERIDES: CPT | Performed by: NURSE PRACTITIONER

## 2023-10-30 PROCEDURE — 93005 ELECTROCARDIOGRAM TRACING: CPT

## 2023-10-30 PROCEDURE — 80053 COMPREHEN METABOLIC PANEL: CPT | Performed by: NURSE PRACTITIONER

## 2023-10-30 PROCEDURE — 63600175 PHARM REV CODE 636 W HCPCS: Performed by: INTERNAL MEDICINE

## 2023-10-30 PROCEDURE — 93010 ELECTROCARDIOGRAM REPORT: CPT | Mod: ,,, | Performed by: INTERNAL MEDICINE

## 2023-10-30 PROCEDURE — 37799 UNLISTED PX VASCULAR SURGERY: CPT

## 2023-10-30 PROCEDURE — 99232 PR SUBSEQUENT HOSPITAL CARE,LEVL II: ICD-10-PCS | Mod: ,,, | Performed by: INTERNAL MEDICINE

## 2023-10-30 PROCEDURE — 25000003 PHARM REV CODE 250: Performed by: EMERGENCY MEDICINE

## 2023-10-30 PROCEDURE — 63600175 PHARM REV CODE 636 W HCPCS: Performed by: NURSE PRACTITIONER

## 2023-10-30 PROCEDURE — 99900026 HC AIRWAY MAINTENANCE (STAT)

## 2023-10-30 PROCEDURE — 83735 ASSAY OF MAGNESIUM: CPT | Performed by: NURSE PRACTITIONER

## 2023-10-30 PROCEDURE — 85730 THROMBOPLASTIN TIME PARTIAL: CPT | Performed by: NURSE PRACTITIONER

## 2023-10-30 PROCEDURE — 99232 SBSQ HOSP IP/OBS MODERATE 35: CPT | Mod: ,,, | Performed by: INTERNAL MEDICINE

## 2023-10-30 PROCEDURE — 85610 PROTHROMBIN TIME: CPT | Mod: 91 | Performed by: NURSE PRACTITIONER

## 2023-10-30 PROCEDURE — 84100 ASSAY OF PHOSPHORUS: CPT | Mod: 91 | Performed by: INTERNAL MEDICINE

## 2023-10-30 PROCEDURE — 85025 COMPLETE CBC W/AUTO DIFF WBC: CPT | Performed by: INTERNAL MEDICINE

## 2023-10-30 PROCEDURE — 27100171 HC OXYGEN HIGH FLOW UP TO 24 HOURS

## 2023-10-30 PROCEDURE — 83735 ASSAY OF MAGNESIUM: CPT | Mod: 91 | Performed by: INTERNAL MEDICINE

## 2023-10-30 PROCEDURE — 94003 VENT MGMT INPAT SUBQ DAY: CPT

## 2023-10-30 PROCEDURE — 93010 EKG 12-LEAD: ICD-10-PCS | Mod: ,,, | Performed by: INTERNAL MEDICINE

## 2023-10-30 PROCEDURE — 84100 ASSAY OF PHOSPHORUS: CPT | Performed by: NURSE PRACTITIONER

## 2023-10-30 PROCEDURE — 94761 N-INVAS EAR/PLS OXIMETRY MLT: CPT

## 2023-10-30 PROCEDURE — 63600175 PHARM REV CODE 636 W HCPCS: Performed by: EMERGENCY MEDICINE

## 2023-10-30 PROCEDURE — 27200966 HC CLOSED SUCTION SYSTEM

## 2023-10-30 PROCEDURE — 84484 ASSAY OF TROPONIN QUANT: CPT | Performed by: NURSE PRACTITIONER

## 2023-10-30 RX ORDER — FAMOTIDINE 40 MG/5ML
20 POWDER, FOR SUSPENSION ORAL 2 TIMES DAILY
Status: DISCONTINUED | OUTPATIENT
Start: 2023-10-30 | End: 2023-10-30

## 2023-10-30 RX ORDER — NITROGLYCERIN 20 MG/100ML
0-400 INJECTION INTRAVENOUS CONTINUOUS
Status: DISCONTINUED | OUTPATIENT
Start: 2023-10-30 | End: 2023-10-31

## 2023-10-30 RX ORDER — FAMOTIDINE 10 MG/ML
20 INJECTION INTRAVENOUS 2 TIMES DAILY
Status: DISCONTINUED | OUTPATIENT
Start: 2023-10-30 | End: 2023-10-31

## 2023-10-30 RX ORDER — METOPROLOL TARTRATE 1 MG/ML
5 INJECTION, SOLUTION INTRAVENOUS ONCE
Status: COMPLETED | OUTPATIENT
Start: 2023-10-30 | End: 2023-10-30

## 2023-10-30 RX ORDER — SODIUM CHLORIDE, SODIUM LACTATE, POTASSIUM CHLORIDE, CALCIUM CHLORIDE 600; 310; 30; 20 MG/100ML; MG/100ML; MG/100ML; MG/100ML
INJECTION, SOLUTION INTRAVENOUS CONTINUOUS
Status: DISCONTINUED | OUTPATIENT
Start: 2023-10-30 | End: 2023-10-30

## 2023-10-30 RX ORDER — NITROGLYCERIN 0.4 MG/1
0.4 TABLET SUBLINGUAL EVERY 5 MIN PRN
Status: DISCONTINUED | OUTPATIENT
Start: 2023-10-30 | End: 2023-11-05

## 2023-10-30 RX ORDER — HEPARIN SODIUM,PORCINE/D5W 25000/250
0-40 INTRAVENOUS SOLUTION INTRAVENOUS CONTINUOUS
Status: DISPENSED | OUTPATIENT
Start: 2023-10-30 | End: 2023-11-02

## 2023-10-30 RX ADMIN — NITROGLYCERIN 5 MCG/MIN: 20 INJECTION INTRAVENOUS at 10:10

## 2023-10-30 RX ADMIN — NOREPINEPHRINE BITARTRATE 0.4 MCG/KG/MIN: 1 INJECTION, SOLUTION, CONCENTRATE INTRAVENOUS at 02:10

## 2023-10-30 RX ADMIN — FAMOTIDINE 20 MG: 10 INJECTION, SOLUTION INTRAVENOUS at 08:10

## 2023-10-30 RX ADMIN — PROPOFOL 20 MCG/KG/MIN: 10 INJECTION, EMULSION INTRAVENOUS at 05:10

## 2023-10-30 RX ADMIN — NITROGLYCERIN 0.4 MG: 0.4 TABLET SUBLINGUAL at 08:10

## 2023-10-30 RX ADMIN — SODIUM CHLORIDE, POTASSIUM CHLORIDE, SODIUM LACTATE AND CALCIUM CHLORIDE: 600; 310; 30; 20 INJECTION, SOLUTION INTRAVENOUS at 03:10

## 2023-10-30 RX ADMIN — PROPOFOL 20 MCG/KG/MIN: 10 INJECTION, EMULSION INTRAVENOUS at 09:10

## 2023-10-30 RX ADMIN — Medication 275 MCG/HR: at 02:10

## 2023-10-30 RX ADMIN — HEPARIN SODIUM 12 UNITS/KG/HR: 10000 INJECTION, SOLUTION INTRAVENOUS at 11:10

## 2023-10-30 RX ADMIN — CHLORHEXIDINE GLUCONATE 0.12% ORAL RINSE 15 ML: 1.2 LIQUID ORAL at 08:10

## 2023-10-30 RX ADMIN — METOROPROLOL TARTRATE 5 MG: 5 INJECTION, SOLUTION INTRAVENOUS at 10:10

## 2023-10-30 RX ADMIN — AMIODARONE HYDROCHLORIDE 1 MG/MIN: 1.8 INJECTION, SOLUTION INTRAVENOUS at 01:10

## 2023-10-30 RX ADMIN — SODIUM CHLORIDE, POTASSIUM CHLORIDE, SODIUM LACTATE AND CALCIUM CHLORIDE 75 ML/HR: 600; 310; 30; 20 INJECTION, SOLUTION INTRAVENOUS at 07:10

## 2023-10-30 RX ADMIN — MUPIROCIN: 20 OINTMENT TOPICAL at 08:10

## 2023-10-30 RX ADMIN — ENOXAPARIN SODIUM 40 MG: 40 INJECTION SUBCUTANEOUS at 04:10

## 2023-10-30 RX ADMIN — AMIODARONE HYDROCHLORIDE 0.5 MG/MIN: 1.8 INJECTION, SOLUTION INTRAVENOUS at 01:10

## 2023-10-30 RX ADMIN — NOREPINEPHRINE BITARTRATE 0.22 MCG/KG/MIN: 4 INJECTION, SOLUTION INTRAVENOUS at 12:10

## 2023-10-30 RX ADMIN — AMIODARONE HYDROCHLORIDE 0.5 MG/MIN: 1.8 INJECTION, SOLUTION INTRAVENOUS at 02:10

## 2023-10-30 RX ADMIN — BUSPIRONE HYDROCHLORIDE 5 MG: 5 TABLET ORAL at 08:10

## 2023-10-30 RX ADMIN — Medication 275 MCG/HR: at 11:10

## 2023-10-30 NOTE — EICU
Intervention Initiated From:  Bedside    Helga intervened regarding:  ECG Change    Nurse Notified:  No    Doctor Notified:  No    Comments: eLert received at 2051 For rhythm change, tachy arrhythmia 130s w/ runs of v-tach. One gram magnesium given by bedside and Amio bolus. V-tach never sustained long enough to defib. HR decreased into the 90s.

## 2023-10-30 NOTE — HOSPITAL COURSE
10/30: HR and BP labile overnight with tachy-manuel rhythm requiring adjustment of drips. Levophed, Amiodarone, fentanyl and propofol infusing. Completed TTM yesterday afternoon  11/1: no acute events overnight. Off levophed and Nitro. Remains on amiodarone infusion. [facility] yesterday with impella placed. Plans for CABG with Dr. Cazares tomorrow  11/2: underwent CABG x3 today with Dr. Cazares- returned to ICU intubated, CT x 4, on precedex and Epi at 0.08, and impella in place  11/3: extubated yesterday evening  to NC. This morning CC is pain at surgical and CT sites. Epi at 0.05, impella in  place, CT x4 in place.   11/4: Chest tubes and impella out yesterday. Sitting up in a chair this morning with PT at bedside. Pain is much less today.

## 2023-10-30 NOTE — ASSESSMENT & PLAN NOTE
h/o PCI in 2007  [facility] in 2022 with multi-vessel disease and pt declined CABG  troponin peaked at 6.192 prior to downtrending; elevated again this am  Cardiology following

## 2023-10-30 NOTE — PROGRESS NOTES
[facility] - Intensive Care [facility])  Critical Care Medicine  Progress Note    Patient Name: Carlin Isaac  MRN: [id number]  Admission Date: 10/28/2023  Hospital Length of Stay: 2 days  Code Status: Full Code  Attending Provider: Marc Tate MD  Primary Care Provider: Eliot Ambriz MD   Principal Problem: Cardiac arrest    Subjective:     HPI:  Mr Isaac is a 69 y/o man with HTN, hypogonadism on chonic testosterone, Chrohn's dz/Ulcerative proctitis, CAD s/p stent to the proximal/mid LAD in 2007, and HLD who presents to the ER today after an out of hospital cardiac arrest.  History taken from the medical record and discussion with staff as patient is unable to provide history and no family at bedside during my exam.  Reportedly, he was working out at [email or web address] when he suddenly collapsed.  EMS administered 3 shocks, 3 epis, and amio bolus en route.  Presumed rythm was VF.    Of note, he had angina in Sept 2022 and [facility] with multi-vessel disease.  He was recommended for CABG, but he declined at the time as his angina had resolved.    In the ER, lab work notable for INR 1.3, Cr 1.9, mildly elevated LFTs,  with negative initial troponin.  Echo fairly unremarkable.  He is intubated and sedated.  TTM in place and will change goal to 36.  Currently on Levo for vasopressor support through CVL placed by ER.      Hospital/ICU Course:  10/30: HR and BP labile overnight with tachy-manuel rhythm requiring adjustment of drips. Levophed, Amiodarone, fentanyl and propofol infusing. Completed TTM yesterday afternoon          Objective:     Vital Signs (Most Recent):  Temp: 96.4 °F (35.8 °C) (10/30/23 0733)  Pulse: (!) 53 (10/30/23 0733)  Resp: (!) 24 (10/30/23 0733)  BP: (!) 147/65 (10/30/23 0701)  SpO2: 100 % (10/30/23 0733) Vital Signs (24h Range):  Temp:  [95.4 °F (35.2 °C)-99 °F (37.2 °C)] 96.4 °F (35.8 °C)  Pulse:  [] 53  Resp:  [9-24] 24  SpO2:  [89 %-100 %] 100 %  BP: ()/(54-91)  147/65  Arterial Line BP: ()/(42-89) 143/61     Weight: 112 kg (246 lb 14.6 oz)  Body mass index is 30.86 kg/m².      Intake/Output Summary (Last 24 hours) at 10/30/2023 0835  Last data filed at 10/30/2023 0800  Gross per 24 hour   Intake 4499.78 ml   Output 2441 ml   Net 2058.78 ml        Physical Exam  Vitals reviewed.   Constitutional:       Appearance: He is well-developed. He is ill-appearing.      Interventions: He is sedated, intubated and restrained.   HENT:      Head: Normocephalic and atraumatic.      Mouth/Throat:      Mouth: Mucous membranes are moist.      Pharynx: Oropharynx is clear.   Eyes:      General: No scleral icterus.     Conjunctiva/sclera: Conjunctivae normal.   Cardiovascular:      Rate and Rhythm: Regular rhythm. Bradycardia present.      Pulses: Normal pulses.   Pulmonary:      Effort: He is intubated.      Comments: Synchronous with vent. Clear bilaterally   Abdominal:      General: There is no distension.      Palpations: Abdomen is soft.   Genitourinary:     Comments: Indwelling urinary catheter with clear yellow urine  Musculoskeletal:         General: No deformity.      Cervical back: Normal range of motion and neck supple.      Right lower leg: No edema.      Left lower leg: No edema.   Skin:     General: Skin is warm and dry.   Neurological:      Comments: Unable to assess- sedated/intubated           Review of Systems    Vents:  Vent Mode: A/C (10/30/23 0733)  Ventilator Initiated: Yes (10/28/23 0938)  Set Rate: 24 BPM (10/30/23 0733)  Vt Set: 500 mL (10/30/23 0733)  PEEP/CPAP: 5 cmH20 (10/30/23 0733)  Oxygen Concentration (%): 50 (10/30/23 0733)  Peak Airway Pressure: 19 cmH20 (10/30/23 0733)  Plateau Pressure: 16 cmH20 (10/30/23 0733)  Total Ve: 12.1 L/m (10/30/23 0733)  Negative Inspiratory Force (cm H2O): 0 (10/30/23 0733)  F/VT Ratio<105 (RSBI): (!) 47.9 (10/30/23 0733)    Lines/Drains/Airways       Central Venous Catheter Line  Duration             Percutaneous Central  Line Insertion/Assessment - Triple Lumen  10/28/23 0950 Internal Jugular Right 1 day              Drain  Duration                  NG/OG Tube 10/28/23 0940 [place] sump 16 Fr. Center mouth 1 day         Urethral Catheter 10/28/23 1318 Temperature probe 1 day              Airway  Duration                  Airway - Non-Surgical 10/28/23 0900 Endotracheal Tube 1 day              Arterial Line  Duration             Arterial Line 10/28/23 1830 Left Femoral 1 day              Peripheral Intravenous Line  Duration                  Peripheral IV - Single Lumen 10/28/23 0915 20 G Left;Posterior Forearm 1 day                    Significant Labs:    CBC/Anemia Profile:  Recent Labs   Lab 10/28/23  0914 10/29/23  0410 10/30/23  0357   WBC 12.60 14.36* 10.46   HGB 16.9 16.7 17.9   HCT 53.3 49.7 53.0    190 248   MCV 94 88 90   RDW 13.3 13.2 13.5        Chemistries:  Recent Labs   Lab 10/28/23  0914 10/28/23  1613 10/29/23  0808 10/29/23  1556 10/29/23  2319      < > 141 141 140   K 4.1   < > 3.9 3.8 4.2      < > 108 109 105   CO2 11*   < > 23 20* 22*   BUN 27*   < > 25* 21 21   CREATININE 1.9*   < > 1.2 1.1 1.3   CALCIUM 8.3*   < > 8.0* 7.9* 8.0*   ALBUMIN 4.0  --  3.5  --   --    PROT 6.8  --  5.9*  --   --    BILITOT 0.5  --  0.7  --   --    ALKPHOS 56  --  46*  --   --    *  --  168*  --   --    *  --  190*  --   --    MG  --    < > 2.3 2.0 2.0   PHOS  --    < > 3.3 2.5* 3.7    < > = values in this interval not displayed.       All pertinent labs within the past 24 hours have been reviewed.    Significant Imaging:  I have reviewed all pertinent imaging results/findings within the past 24 hours.      ABG  Recent Labs   Lab 10/30/23  0359   PH 7.281*   PO2 89   PCO2 48.4*   HCO3 22.8*   BE -4*     Assessment/Plan:     Pulmonary  Acute hypercapnic respiratory failure  Patient with Hypercapnic Respiratory failure which is Acute.  he is not on home oxygen. Supplemental oxygen was provided and noted-  Vent Mode: A/C  Oxygen Concentration (%):  [30-50] 30  Resp Rate Total:  [20 br/min-29 br/min] 24 br/min  Vt Set:  [500 mL] 500 mL  PEEP/CPAP:  [5 uaP45-34 cmH20] 5 cmH20  Mean Airway Pressure:  [9.3 mcR88-57 cmH20] 13 cmH20    .   Signs/symptoms of respiratory failure include- lethargy. Contributing diagnoses includes - cardiac arrest Labs and images were reviewed. Patient Has recent ABG, which has been reviewed. Will treat underlying causes and adjust management of respiratory failure as follows - ventilator support    intubated 10/28  ABGs reviewed; continue PRN  Vent settings adjusted  VAP prevention bundle in place   TTM completed, will do SAT and assess neurologic exam  SBT when appropriate    Cardiac/Vascular  * Cardiac arrest  presumed VT/VF arrest given 3 shocks, 3 epis, and amio bolus in the field  CT head on admit without acute findings  S/p TTM  Cardiology following  troponins down-trending but elevated again this am   Vasopressors in place for hemodynamic support  Remains intubated and sedated- plan to wean sedation today  [facility] when able if recovers neurologically       Hyperlipidemia  Holding statin acutely  Can restart when LFTs normal     Primary hypertension  Currently in shock on vasopressors      Coronary artery disease involving native coronary artery of native heart  h/o PCI in 2007  [facility] in 2022 with multi-vessel disease and pt declined CABG  troponin peaked at 6.192 prior to downtrending; elevated again this am  Cardiology following    Renal/  KAREN (acute kidney injury)  Cr 1.9 at admission; b/l around 1.2  likely 2/2 to arrest and shock  improved with resuscitation   Good urine output but Cr up a little today   Renal dose medications and avoid nephrotoxic meds as able  Monitor urine output, electrolytes and acid/base balance closely       Endocrine  Hypogonadism in male  Holding testosterone replacement    GI  Ulcerative proctitis  Doesn't appear he takes anything regularly at home (mention of  PRN sulfasalazine in some clinic notes)  Supportive Care    Orthopedic  Non-traumatic rhabdomyolysis  CPK has peaked and down trended   S/p IVFs    DVT prophylaxis: lovenox  GI prophylaxis: pepcid  Code status: Full  Disposition: cont ICU care    Critical Care Time: 32 minutes  Critical secondary to Patient has a condition that poses threat to life and bodily function: Cardiac arrest 2/2 V-tach, shock, KAREN, requiring management of high risk medication infusions and mechanical ventilation       Critical care was time spent personally by me on the following activities: development of treatment plan with patient or surrogate and bedside caregivers, discussions with consultants, evaluation of patient's response to treatment, examination of patient, ordering and performing treatments and interventions, ordering and review of laboratory studies, ordering and review of radiographic studies, pulse oximetry, re-evaluation of patient's condition. This critical care time did not overlap with that of any other provider or involve time for any procedures.     Sandra Dobson NP  Critical Care Medicine  [facility] - Intensive Care [facility])

## 2023-10-30 NOTE — PROGRESS NOTES
[facility] - Intensive Care ([facility])  Cardiology  Progress Note    Patient Name: Carlin Isaac  MRN: [id number]  Admission Date: 10/28/2023  Hospital Length of Stay: 2 days  Code Status: Full Code   Attending Physician: Marc Tate MD   Primary Care Physician: Eliot Ambriz MD  Expected Discharge Date:   Principal Problem:Cardiac arrest    Subjective:     Hospital Course:   10/29/2023: The patient is supported intubated stable.  Cardiac echo shows normal LV function troponin levels are elevated EKG shows ST segments have reverted back to normal sinus rhythm with normal intervals and no evidence of further ST changes.  Putting the wife the patient has significant coronary disease and had refused bypass surgery last year.  Will plan another heart catheterization after he is wakeful and extubated.  Clinically stable this time with supportive care.    10/30/23   Intubated, NSR, some nonsustained tachyrhythmia overnight.Pt on Levo, Amio,.       Interval History:     Review of Systems   Constitutional: Negative. Negative for weight gain.   HENT: Negative.     Eyes: Negative.    Cardiovascular: Negative.  Negative for chest pain, leg swelling and palpitations.   Respiratory: Negative.  Negative for shortness of breath.    Endocrine: Negative.    Hematologic/Lymphatic: Negative.    Skin: Negative.    Musculoskeletal:  Negative for muscle weakness.   Gastrointestinal: Negative.    Genitourinary: Negative.    Neurological: Negative.  Negative for dizziness.   Psychiatric/Behavioral: Negative.     Allergic/Immunologic: Negative.    All other systems reviewed and are negative.    Objective:     Vital Signs (Most Recent):  Temp: 97 °F (36.1 °C) (10/30/23 1001)  Pulse: (Abnormal) 53 (10/30/23 1016)  Resp: (Abnormal) 24 (10/30/23 1016)  BP: (Abnormal) 129/59 (10/30/23 0901)  SpO2: 100 % (10/30/23 1016) Vital Signs (24h Range):  Temp:  [95.4 °F (35.2 °C)-99 °F (37.2 °C)] 97 °F (36.1 °C)  Pulse:  [] 53  Resp:   [9-24] 24  SpO2:  [64 %-100 %] 100 %  BP: ()/(54-91) 129/59  Arterial Line BP: ()/(42-89) 142/59     Weight: 112 kg (246 lb 14.6 oz)  Body mass index is 30.86 kg/m².     SpO2: 100 %         Intake/Output Summary (Last 24 hours) at 10/30/2023 1035  Last data filed at 10/30/2023 1000  Gross per 24 hour   Intake 4375.43 ml   Output 1841 ml   Net 2534.43 ml       Lines/Drains/Airways       Central Venous Catheter Line       Name Duration    Percutaneous Central Line Insertion/Assessment - Triple Lumen  10/28/23 0950 Internal Jugular Right 2 days              Drain       Name Duration         NG/OG Tube 10/28/23 0940 [place] sump 16 Fr. Center mouth 2 days         Urethral Catheter 10/28/23 1318 Temperature probe 1 day              Airway       Name Duration         Airway - Non-Surgical 10/28/23 0900 Endotracheal Tube 2 days              Arterial Line       Name Duration    Arterial Line 10/28/23 1830 Left Femoral 1 day              Peripheral Intravenous Line       Name Duration         Peripheral IV - Single Lumen 10/28/23 0915 20 G Left;Posterior Forearm 2 days                       Physical Exam       Significant Labs: All pertinent lab results from the last 24 hours have been reviewed.    Significant Imaging: X-Ray: CXR: X-Ray Chest 1 View (CXR): No results found for this visit on 10/28/23.    Assessment and Plan:     Brief HPI:     * Cardiac arrest  Will continue supportive care  Continue amiodarone  Continue enoxaparin  Add BB if needed    Coronary artery disease involving native coronary artery of native heart  Significant CAD, EKG shows anterior and lateral ischemia  Plan nitrates if possible  1 st troponin negative and will trend  Likely cardiac event was VT  Cardiac echo shows  Preserved LV function        VTE Risk Mitigation (From admission, onward)         Ordered     enoxaparin injection 40 mg  Daily         10/28/23 1229     IP VTE HIGH RISK PATIENT  Once         10/28/23 1229     Place  sequential compression device  Until discontinued         10/28/23 1229                Jared iNeto MD  Cardiology  [facility] - Intensive Care ([facility])

## 2023-10-30 NOTE — ASSESSMENT & PLAN NOTE
presumed VT/VF arrest given 3 shocks, 3 epis, and amio bolus in the field  CT head on admit without acute findings  S/p TTM  Cardiology following  troponins down-trending but elevated again this am   Vasopressors in place for hemodynamic support  Remains intubated and sedated- plan to wean sedation today  [facility] when able if recovers neurologically

## 2023-10-30 NOTE — TREATMENT PLAN
Over the past hour:    Patient had change in rhythm from bradycardia to SVT around 130 with sudden drop in BP down to 70's systolic.  12 lead EKG ordered.    Nurse at bedside titrating dopamine and Levophed infusions. Also remains on amiodarone.    He is having runs of VT. Not sustained long enough for defibrillation.  BP is wildly fluctuating from 70's up to 180's systolic and super sensitive to changes in his pressors.    Consulted with cardiology on call, Dr. Graham.    He recommended re-bolus amiodarone, decreasing dopamine, and suggested lidocaine infusion.  These orders in place and changes implemented.    2140  Lung sounds -- crackles bilaterally and some thin pink fluid with suctioning ETT, appears to have pulmonary edema  Lasix 40 mg IV ordered    He has been weaned off dopamine and levophed is being weaned down.    HR has stabilized around 90, with no further runs of VT.    RT at bedside, adjusting ventilator to keep SpO2 up -- he has had desaturations with all of the above fluctuations in HR and BP and is currently on 100% FIO2    Lidocaine was never given, as no further runs of VT once amiodarone bolus was given.    Significant other was at bedside and has been updated on patient status.

## 2023-10-30 NOTE — PLAN OF CARE
Problem: Adult Inpatient Plan of Care  Goal: Plan of Care Review  Outcome: Ongoing, Not Progressing   Pt remains intubated and sedated, RASS not at goal due to titrations made for vent synchrony per NP. TTM in progress, maintaining temp at 37C; pt did spike temp overnight of 100.7F, ice packs were applied and tylenol was given; temp now back to 37C. Pt bradycardic on monitor, multiple significant rhythm changes overnight including SVT and non-sustained Vtach (see NP's note). BP labile, dopamine titrated off per NP's order and Levo re-started. Central line infusing Levo, Amio, prop, fent, and LR. Approximately 550ml total urine output this shift. BSWR in place for pt safety. POC reviewed with significant other at bedside, verbalized understanding. Will continue with current POC and update as needed.

## 2023-10-30 NOTE — ASSESSMENT & PLAN NOTE
Cr 1.9 at admission; b/l around 1.2  likely 2/2 to arrest and shock  improved with resuscitation   Good urine output but Cr up a little today   Renal dose medications and avoid nephrotoxic meds as able  Monitor urine output, electrolytes and acid/base balance closely

## 2023-10-30 NOTE — SUBJECTIVE & OBJECTIVE
Interval History:     Review of Systems   Constitutional: Negative. Negative for weight gain.   HENT: Negative.     Eyes: Negative.    Cardiovascular: Negative.  Negative for chest pain, leg swelling and palpitations.   Respiratory: Negative.  Negative for shortness of breath.    Endocrine: Negative.    Hematologic/Lymphatic: Negative.    Skin: Negative.    Musculoskeletal:  Negative for muscle weakness.   Gastrointestinal: Negative.    Genitourinary: Negative.    Neurological: Negative.  Negative for dizziness.   Psychiatric/Behavioral: Negative.     Allergic/Immunologic: Negative.    All other systems reviewed and are negative.    Objective:     Vital Signs (Most Recent):  Temp: 97 °F (36.1 °C) (10/30/23 1001)  Pulse: (Abnormal) 53 (10/30/23 1016)  Resp: (Abnormal) 24 (10/30/23 1016)  BP: (Abnormal) 129/59 (10/30/23 0901)  SpO2: 100 % (10/30/23 1016) Vital Signs (24h Range):  Temp:  [95.4 °F (35.2 °C)-99 °F (37.2 °C)] 97 °F (36.1 °C)  Pulse:  [] 53  Resp:  [9-24] 24  SpO2:  [64 %-100 %] 100 %  BP: ()/(54-91) 129/59  Arterial Line BP: ()/(42-89) 142/59     Weight: 112 kg (246 lb 14.6 oz)  Body mass index is 30.86 kg/m².     SpO2: 100 %         Intake/Output Summary (Last 24 hours) at 10/30/2023 1035  Last data filed at 10/30/2023 1000  Gross per 24 hour   Intake 4375.43 ml   Output 1841 ml   Net 2534.43 ml       Lines/Drains/Airways       Central Venous Catheter Line       Name Duration    Percutaneous Central Line Insertion/Assessment - Triple Lumen  10/28/23 0950 Internal Jugular Right 2 days              Drain       Name Duration         NG/OG Tube 10/28/23 0940 [place] sump 16 Fr. Center mouth 2 days         Urethral Catheter 10/28/23 1318 Temperature probe 1 day              Airway       Name Duration         Airway - Non-Surgical 10/28/23 0900 Endotracheal Tube 2 days              Arterial Line       Name Duration    Arterial Line 10/28/23 1830 Left Femoral 1 day              Peripheral  Intravenous Line       Name Duration         Peripheral IV - Single Lumen 10/28/23 0915 20 G Left;Posterior Forearm 2 days                       Physical Exam       Significant Labs: All pertinent lab results from the last 24 hours have been reviewed.    Significant Imaging: X-Ray: CXR: X-Ray Chest 1 View (CXR): No results found for this visit on 10/28/23.

## 2023-10-30 NOTE — ASSESSMENT & PLAN NOTE
Patient with Hypercapnic Respiratory failure which is Acute.  he is not on home oxygen. Supplemental oxygen was provided and noted- Vent Mode: A/C  Oxygen Concentration (%):  [30-50] 30  Resp Rate Total:  [20 br/min-29 br/min] 24 br/min  Vt Set:  [500 mL] 500 mL  PEEP/CPAP:  [5 lpQ54-90 cmH20] 5 cmH20  Mean Airway Pressure:  [9.3 hyF92-60 cmH20] 13 cmH20    .   Signs/symptoms of respiratory failure include- lethargy. Contributing diagnoses includes - cardiac arrest Labs and images were reviewed. Patient Has recent ABG, which has been reviewed. Will treat underlying causes and adjust management of respiratory failure as follows - ventilator support    intubated 10/28  ABGs reviewed; continue PRN  Vent settings adjusted  VAP prevention bundle in place   TTM completed, will do SAT and assess neurologic exam  SBT when appropriate

## 2023-10-30 NOTE — NURSING
See NP's note for significant event. Multiple changes made to sedation and pressors outside of normal titration parameters per NP at bedside to stabilize pt.

## 2023-10-30 NOTE — SUBJECTIVE & OBJECTIVE
Objective:     Vital Signs (Most Recent):  Temp: 96.4 °F (35.8 °C) (10/30/23 0733)  Pulse: (!) 53 (10/30/23 0733)  Resp: (!) 24 (10/30/23 0733)  BP: (!) 147/65 (10/30/23 0701)  SpO2: 100 % (10/30/23 0733) Vital Signs (24h Range):  Temp:  [95.4 °F (35.2 °C)-99 °F (37.2 °C)] 96.4 °F (35.8 °C)  Pulse:  [] 53  Resp:  [9-24] 24  SpO2:  [89 %-100 %] 100 %  BP: ()/(54-91) 147/65  Arterial Line BP: ()/(42-89) 143/61     Weight: 112 kg (246 lb 14.6 oz)  Body mass index is 30.86 kg/m².      Intake/Output Summary (Last 24 hours) at 10/30/2023 0835  Last data filed at 10/30/2023 0800  Gross per 24 hour   Intake 4499.78 ml   Output 2441 ml   Net 2058.78 ml        Physical Exam  Vitals reviewed.   Constitutional:       Appearance: He is well-developed. He is ill-appearing.      Interventions: He is sedated, intubated and restrained.   HENT:      Head: Normocephalic and atraumatic.      Mouth/Throat:      Mouth: Mucous membranes are moist.      Pharynx: Oropharynx is clear.   Eyes:      General: No scleral icterus.     Conjunctiva/sclera: Conjunctivae normal.   Cardiovascular:      Rate and Rhythm: Regular rhythm. Bradycardia present.      Pulses: Normal pulses.   Pulmonary:      Effort: He is intubated.      Comments: Synchronous with vent. Clear bilaterally   Abdominal:      General: There is no distension.      Palpations: Abdomen is soft.   Genitourinary:     Comments: Indwelling urinary catheter with clear yellow urine  Musculoskeletal:         General: No deformity.      Cervical back: Normal range of motion and neck supple.      Right lower leg: No edema.      Left lower leg: No edema.   Skin:     General: Skin is warm and dry.   Neurological:      Comments: Unable to assess- sedated/intubated           Review of Systems    Vents:  Vent Mode: A/C (10/30/23 0733)  Ventilator Initiated: Yes (10/28/23 0938)  Set Rate: 24 BPM (10/30/23 0733)  Vt Set: 500 mL (10/30/23 0733)  PEEP/CPAP: 5 cmH20 (10/30/23  0733)  Oxygen Concentration (%): 50 (10/30/23 0733)  Peak Airway Pressure: 19 cmH20 (10/30/23 0733)  Plateau Pressure: 16 cmH20 (10/30/23 0733)  Total Ve: 12.1 L/m (10/30/23 0733)  Negative Inspiratory Force (cm H2O): 0 (10/30/23 0733)  F/VT Ratio<105 (RSBI): (!) 47.9 (10/30/23 0733)    Lines/Drains/Airways       Central Venous Catheter Line  Duration             Percutaneous Central Line Insertion/Assessment - Triple Lumen  10/28/23 0950 Internal Jugular Right 1 day              Drain  Duration                  NG/OG Tube 10/28/23 0940 [place] sump 16 Fr. Center mouth 1 day         Urethral Catheter 10/28/23 1318 Temperature probe 1 day              Airway  Duration                  Airway - Non-Surgical 10/28/23 0900 Endotracheal Tube 1 day              Arterial Line  Duration             Arterial Line 10/28/23 1830 Left Femoral 1 day              Peripheral Intravenous Line  Duration                  Peripheral IV - Single Lumen 10/28/23 0915 20 G Left;Posterior Forearm 1 day                    Significant Labs:    CBC/Anemia Profile:  Recent Labs   Lab 10/28/23  0914 10/29/23  0410 10/30/23  0357   WBC 12.60 14.36* 10.46   HGB 16.9 16.7 17.9   HCT 53.3 49.7 53.0    190 248   MCV 94 88 90   RDW 13.3 13.2 13.5        Chemistries:  Recent Labs   Lab 10/28/23  0914 10/28/23  1613 10/29/23  0808 10/29/23  1556 10/29/23  2319      < > 141 141 140   K 4.1   < > 3.9 3.8 4.2      < > 108 109 105   CO2 11*   < > 23 20* 22*   BUN 27*   < > 25* 21 21   CREATININE 1.9*   < > 1.2 1.1 1.3   CALCIUM 8.3*   < > 8.0* 7.9* 8.0*   ALBUMIN 4.0  --  3.5  --   --    PROT 6.8  --  5.9*  --   --    BILITOT 0.5  --  0.7  --   --    ALKPHOS 56  --  46*  --   --    *  --  168*  --   --    *  --  190*  --   --    MG  --    < > 2.3 2.0 2.0   PHOS  --    < > 3.3 2.5* 3.7    < > = values in this interval not displayed.       All pertinent labs within the past 24 hours have been reviewed.    Significant  Imaging:  I have reviewed all pertinent imaging results/findings within the past 24 hours.

## 2023-10-31 ENCOUNTER — ANESTHESIA EVENT (OUTPATIENT)
Dept: SURGERY | Facility: HOSPITAL | Age: 68
DRG: 215 | End: 2023-10-31
Payer: MEDICARE

## 2023-10-31 LAB
ALBUMIN SERPL BCP-MCNC: 2.6 G/DL (ref 3.5–5.2)
ALP SERPL-CCNC: 39 U/L (ref 55–135)
ALT SERPL W/O P-5'-P-CCNC: 142 U/L (ref 10–44)
ANION GAP SERPL CALC-SCNC: 10 MMOL/L (ref 8–16)
AORTIC ROOT ANNULUS: 2.89 CM
APTT PPP: 30.6 SEC (ref 21–32)
APTT PPP: 48.9 SEC (ref 21–32)
ASCENDING AORTA: 3.32 CM
AST SERPL-CCNC: 249 U/L (ref 10–40)
AV INDEX (PROSTH): 0.85
AV MEAN GRADIENT: 6 MMHG
AV PEAK GRADIENT: 11 MMHG
AV VALVE AREA BY VELOCITY RATIO: 2.84 CM²
AV VALVE AREA: 3.04 CM²
AV VELOCITY RATIO: 0.8
BASOPHILS # BLD AUTO: 0.03 K/UL (ref 0–0.2)
BASOPHILS NFR BLD: 0.3 % (ref 0–1.9)
BILIRUB SERPL-MCNC: 0.7 MG/DL (ref 0.1–1)
BSA FOR ECHO PROCEDURE: 2.43 M2
BUN SERPL-MCNC: 20 MG/DL (ref 8–23)
CALCIUM SERPL-MCNC: 8 MG/DL (ref 8.7–10.5)
CHLORIDE SERPL-SCNC: 102 MMOL/L (ref 95–110)
CO2 SERPL-SCNC: 28 MMOL/L (ref 23–29)
CREAT SERPL-MCNC: 1.1 MG/DL (ref 0.5–1.4)
CV ECHO LV RWT: 0.42 CM
DIFFERENTIAL METHOD: ABNORMAL
DOP CALC AO PEAK VEL: 1.64 M/S
DOP CALC AO VTI: 28.6 CM
DOP CALC LVOT AREA: 3.6 CM2
DOP CALC LVOT DIAMETER: 2.13 CM
DOP CALC LVOT PEAK VEL: 1.31 M/S
DOP CALC LVOT STROKE VOLUME: 86.9 CM3
DOP CALC RVOT PEAK VEL: 0.85 M/S
DOP CALC RVOT VTI: 11.9 CM
DOP CALCLVOT PEAK VEL VTI: 24.4 CM
E WAVE DECELERATION TIME: 198.6 MSEC
E/A RATIO: 0.94
E/E' RATIO: 6.8 M/S
ECHO LV POSTERIOR WALL: 1.25 CM (ref 0.6–1.1)
EOSINOPHIL # BLD AUTO: 0 K/UL (ref 0–0.5)
EOSINOPHIL NFR BLD: 0.1 % (ref 0–8)
ERYTHROCYTE [DISTWIDTH] IN BLOOD BY AUTOMATED COUNT: 13.5 % (ref 11.5–14.5)
EST. GFR  (NO RACE VARIABLE): >60 ML/MIN/1.73 M^2
FRACTIONAL SHORTENING: 21 % (ref 28–44)
GLUCOSE SERPL-MCNC: 109 MG/DL (ref 70–110)
HCT VFR BLD AUTO: 40.4 % (ref 40–54)
HGB BLD-MCNC: 13.6 G/DL (ref 14–18)
IMM GRANULOCYTES # BLD AUTO: 0.03 K/UL (ref 0–0.04)
IMM GRANULOCYTES NFR BLD AUTO: 0.3 % (ref 0–0.5)
INR PPP: 1.2 (ref 0.8–1.2)
INTERVENTRICULAR SEPTUM: 1.01 CM (ref 0.6–1.1)
IVC DIAMETER: 2.63 CM
IVRT: 57.09 MSEC
LA MAJOR: 5.22 CM
LA MINOR: 4.63 CM
LA WIDTH: 4.1 CM
LDH SERPL L TO P-CCNC: 685 U/L (ref 110–260)
LEFT ATRIUM SIZE: 4.01 CM
LEFT ATRIUM VOLUME INDEX: 28.6 ML/M2
LEFT ATRIUM VOLUME: 68.58 CM3
LEFT INTERNAL DIMENSION IN SYSTOLE: 4.64 CM (ref 2.1–4)
LEFT VENTRICLE DIASTOLIC VOLUME INDEX: 72.51 ML/M2
LEFT VENTRICLE DIASTOLIC VOLUME: 174.03 ML
LEFT VENTRICLE MASS INDEX: 118 G/M2
LEFT VENTRICLE SYSTOLIC VOLUME INDEX: 41.3 ML/M2
LEFT VENTRICLE SYSTOLIC VOLUME: 99.11 ML
LEFT VENTRICULAR INTERNAL DIMENSION IN DIASTOLE: 5.91 CM (ref 3.5–6)
LEFT VENTRICULAR MASS: 282.58 G
LV LATERAL E/E' RATIO: 6.18 M/S
LV SEPTAL E/E' RATIO: 7.56 M/S
LVOT MG: 4.06 MMHG
LVOT MV: 0.96 CM/S
LYMPHOCYTES # BLD AUTO: 0.8 K/UL (ref 1–4.8)
LYMPHOCYTES NFR BLD: 7.9 % (ref 18–48)
MAGNESIUM SERPL-MCNC: 1.8 MG/DL (ref 1.6–2.6)
MCH RBC QN AUTO: 29.8 PG (ref 27–31)
MCHC RBC AUTO-ENTMCNC: 33.7 G/DL (ref 32–36)
MCV RBC AUTO: 89 FL (ref 82–98)
MONOCYTES # BLD AUTO: 0.9 K/UL (ref 0.3–1)
MONOCYTES NFR BLD: 8.8 % (ref 4–15)
MV PEAK A VEL: 0.72 M/S
MV PEAK E VEL: 0.68 M/S
MV STENOSIS PRESSURE HALF TIME: 57.59 MS
MV VALVE AREA P 1/2 METHOD: 3.82 CM2
NEUTROPHILS # BLD AUTO: 8.2 K/UL (ref 1.8–7.7)
NEUTROPHILS NFR BLD: 82.6 % (ref 38–73)
NRBC BLD-RTO: 0 /100 WBC
PHOSPHATE SERPL-MCNC: 1.7 MG/DL (ref 2.7–4.5)
PISA MRMAX VEL: 5.44 M/S
PISA TR MAX VEL: 2.83 M/S
PLATELET # BLD AUTO: 159 K/UL (ref 150–450)
PMV BLD AUTO: 10.2 FL (ref 9.2–12.9)
POCT GLUCOSE: 88 MG/DL (ref 70–110)
POTASSIUM SERPL-SCNC: 3.9 MMOL/L (ref 3.5–5.1)
PROT SERPL-MCNC: 5.3 G/DL (ref 6–8.4)
PROTHROMBIN TIME: 12.6 SEC (ref 9–12.5)
PV MEAN GRADIENT: 1 MMHG
RA MAJOR: 5.77 CM
RA PRESSURE ESTIMATED: 3 MMHG
RA WIDTH: 3.9 CM
RBC # BLD AUTO: 4.56 M/UL (ref 4.6–6.2)
RV MID DIAMA: 3.31 CM
RV TB RVSP: 6 MMHG
SODIUM SERPL-SCNC: 140 MMOL/L (ref 136–145)
STJ: 3.49 CM
TDI LATERAL: 0.11 M/S
TDI SEPTAL: 0.09 M/S
TDI: 0.1 M/S
TR MAX PG: 32 MMHG
TRICUSPID ANNULAR PLANE SYSTOLIC EXCURSION: 2.5 CM
TROPONIN I SERPL DL<=0.01 NG/ML-MCNC: >50 NG/ML (ref 0–0.03)
TROPONIN I SERPL DL<=0.01 NG/ML-MCNC: >50 NG/ML (ref 0–0.03)
TV REST PULMONARY ARTERY PRESSURE: 35 MMHG
WBC # BLD AUTO: 9.93 K/UL (ref 3.9–12.7)
Z-SCORE OF LEFT VENTRICULAR DIMENSION IN END DIASTOLE: -6.15
Z-SCORE OF LEFT VENTRICULAR DIMENSION IN END SYSTOLE: -2.72

## 2023-10-31 PROCEDURE — 27000221 HC OXYGEN, UP TO 24 HOURS

## 2023-10-31 PROCEDURE — C1894 INTRO/SHEATH, NON-LASER: HCPCS | Performed by: INTERNAL MEDICINE

## 2023-10-31 PROCEDURE — 85610 PROTHROMBIN TIME: CPT | Performed by: THORACIC SURGERY (CARDIOTHORACIC VASCULAR SURGERY)

## 2023-10-31 PROCEDURE — 27801859 OPTIME CATHETER, IMPELLA: Performed by: INTERNAL MEDICINE

## 2023-10-31 PROCEDURE — 84100 ASSAY OF PHOSPHORUS: CPT | Performed by: NURSE PRACTITIONER

## 2023-10-31 PROCEDURE — 25000003 PHARM REV CODE 250: Performed by: INTERNAL MEDICINE

## 2023-10-31 PROCEDURE — 85025 COMPLETE CBC W/AUTO DIFF WBC: CPT | Performed by: INTERNAL MEDICINE

## 2023-10-31 PROCEDURE — 99232 PR SUBSEQUENT HOSPITAL CARE,LEVL II: ICD-10-PCS | Mod: ,,, | Performed by: INTERNAL MEDICINE

## 2023-10-31 PROCEDURE — C1769 GUIDE WIRE: HCPCS | Performed by: INTERNAL MEDICINE

## 2023-10-31 PROCEDURE — 25000003 PHARM REV CODE 250: Performed by: NURSE PRACTITIONER

## 2023-10-31 PROCEDURE — 63600175 PHARM REV CODE 636 W HCPCS: Performed by: INTERNAL MEDICINE

## 2023-10-31 PROCEDURE — 93458 L HRT ARTERY/VENTRICLE ANGIO: CPT | Performed by: INTERNAL MEDICINE

## 2023-10-31 PROCEDURE — 94761 N-INVAS EAR/PLS OXIMETRY MLT: CPT

## 2023-10-31 PROCEDURE — 63600175 PHARM REV CODE 636 W HCPCS: Performed by: NURSE PRACTITIONER

## 2023-10-31 PROCEDURE — 99153 MOD SED SAME PHYS/QHP EA: CPT | Performed by: INTERNAL MEDICINE

## 2023-10-31 PROCEDURE — 33990 PR INSERT, VAD, PERCUT, LT HEART, ART ACCESS ONLY: ICD-10-PCS | Mod: 51,,, | Performed by: INTERNAL MEDICINE

## 2023-10-31 PROCEDURE — 25500020 PHARM REV CODE 255: Performed by: INTERNAL MEDICINE

## 2023-10-31 PROCEDURE — 85347 COAGULATION TIME ACTIVATED: CPT | Performed by: INTERNAL MEDICINE

## 2023-10-31 PROCEDURE — 99152 MOD SED SAME PHYS/QHP 5/>YRS: CPT | Performed by: INTERNAL MEDICINE

## 2023-10-31 PROCEDURE — 63600175 PHARM REV CODE 636 W HCPCS: Performed by: THORACIC SURGERY (CARDIOTHORACIC VASCULAR SURGERY)

## 2023-10-31 PROCEDURE — 93458 L HRT ARTERY/VENTRICLE ANGIO: CPT | Mod: 26,,, | Performed by: INTERNAL MEDICINE

## 2023-10-31 PROCEDURE — 84484 ASSAY OF TROPONIN QUANT: CPT | Performed by: NURSE PRACTITIONER

## 2023-10-31 PROCEDURE — 27201423 OPTIME MED/SURG SUP & DEVICES STERILE SUPPLY: Performed by: INTERNAL MEDICINE

## 2023-10-31 PROCEDURE — 25000003 PHARM REV CODE 250: Performed by: THORACIC SURGERY (CARDIOTHORACIC VASCULAR SURGERY)

## 2023-10-31 PROCEDURE — 83615 LACTATE (LD) (LDH) ENZYME: CPT | Performed by: INTERNAL MEDICINE

## 2023-10-31 PROCEDURE — 83735 ASSAY OF MAGNESIUM: CPT | Performed by: NURSE PRACTITIONER

## 2023-10-31 PROCEDURE — 85730 THROMBOPLASTIN TIME PARTIAL: CPT | Performed by: NURSE PRACTITIONER

## 2023-10-31 PROCEDURE — 85730 THROMBOPLASTIN TIME PARTIAL: CPT | Mod: 91 | Performed by: INTERNAL MEDICINE

## 2023-10-31 PROCEDURE — 33990 INSJ PERQ VAD L HRT ARTERIAL: CPT | Performed by: INTERNAL MEDICINE

## 2023-10-31 PROCEDURE — 99900035 HC TECH TIME PER 15 MIN (STAT)

## 2023-10-31 PROCEDURE — 93458 PR CATH PLACE/CORON ANGIO, IMG SUPER/INTERP,W LEFT HEART VENTRICULOGRAPHY: ICD-10-PCS | Mod: 26,,, | Performed by: INTERNAL MEDICINE

## 2023-10-31 PROCEDURE — 99152 PR MOD CONSCIOUS SEDATION, SAME PHYS, 5+ YRS, FIRST 15 MIN: ICD-10-PCS | Mod: ,,, | Performed by: INTERNAL MEDICINE

## 2023-10-31 PROCEDURE — 33990 INSJ PERQ VAD L HRT ARTERIAL: CPT | Mod: 51,,, | Performed by: INTERNAL MEDICINE

## 2023-10-31 PROCEDURE — 84484 ASSAY OF TROPONIN QUANT: CPT | Mod: 91 | Performed by: INTERNAL MEDICINE

## 2023-10-31 PROCEDURE — 99152 MOD SED SAME PHYS/QHP 5/>YRS: CPT | Mod: ,,, | Performed by: INTERNAL MEDICINE

## 2023-10-31 PROCEDURE — 99232 SBSQ HOSP IP/OBS MODERATE 35: CPT | Mod: ,,, | Performed by: INTERNAL MEDICINE

## 2023-10-31 PROCEDURE — 20000000 HC ICU ROOM

## 2023-10-31 PROCEDURE — 80053 COMPREHEN METABOLIC PANEL: CPT | Performed by: NURSE PRACTITIONER

## 2023-10-31 RX ORDER — SODIUM CHLORIDE 9 MG/ML
INJECTION, SOLUTION INTRAVENOUS CONTINUOUS
Status: DISCONTINUED | OUTPATIENT
Start: 2023-10-31 | End: 2023-10-31

## 2023-10-31 RX ORDER — LIDOCAINE 50 MG/G
1 PATCH TOPICAL
Status: DISCONTINUED | OUTPATIENT
Start: 2023-10-31 | End: 2023-11-02

## 2023-10-31 RX ORDER — LIDOCAINE HYDROCHLORIDE 20 MG/ML
INJECTION, SOLUTION INFILTRATION; PERINEURAL
Status: DISCONTINUED | OUTPATIENT
Start: 2023-10-31 | End: 2023-10-31 | Stop reason: HOSPADM

## 2023-10-31 RX ORDER — ASPIRIN 325 MG
325 TABLET ORAL ONCE
Status: COMPLETED | OUTPATIENT
Start: 2023-10-31 | End: 2023-10-31

## 2023-10-31 RX ORDER — METOPROLOL SUCCINATE 25 MG/1
25 TABLET, EXTENDED RELEASE ORAL DAILY
Status: DISCONTINUED | OUTPATIENT
Start: 2023-10-31 | End: 2023-11-02

## 2023-10-31 RX ORDER — NAPROXEN SODIUM 220 MG/1
81 TABLET, FILM COATED ORAL DAILY
Status: DISCONTINUED | OUTPATIENT
Start: 2023-11-01 | End: 2023-11-02

## 2023-10-31 RX ORDER — HYDROCODONE BITARTRATE AND ACETAMINOPHEN 5; 325 MG/1; MG/1
1 TABLET ORAL EVERY 6 HOURS PRN
Status: DISCONTINUED | OUTPATIENT
Start: 2023-10-31 | End: 2023-11-02

## 2023-10-31 RX ORDER — ACETAMINOPHEN 325 MG/1
650 TABLET ORAL EVERY 4 HOURS PRN
Status: DISCONTINUED | OUTPATIENT
Start: 2023-10-31 | End: 2023-11-07 | Stop reason: HOSPADM

## 2023-10-31 RX ORDER — MIDAZOLAM HYDROCHLORIDE 1 MG/ML
INJECTION, SOLUTION INTRAMUSCULAR; INTRAVENOUS
Status: DISCONTINUED | OUTPATIENT
Start: 2023-10-31 | End: 2023-10-31 | Stop reason: HOSPADM

## 2023-10-31 RX ORDER — FENTANYL CITRATE 50 UG/ML
INJECTION, SOLUTION INTRAMUSCULAR; INTRAVENOUS
Status: DISCONTINUED | OUTPATIENT
Start: 2023-10-31 | End: 2023-10-31 | Stop reason: HOSPADM

## 2023-10-31 RX ORDER — DIPHENHYDRAMINE HCL 50 MG
50 CAPSULE ORAL ONCE
Status: DISCONTINUED | OUTPATIENT
Start: 2023-10-31 | End: 2023-10-31 | Stop reason: HOSPADM

## 2023-10-31 RX ORDER — SODIUM CHLORIDE 0.9 % (FLUSH) 0.9 %
10 SYRINGE (ML) INJECTION
Status: DISCONTINUED | OUTPATIENT
Start: 2023-10-31 | End: 2023-11-07 | Stop reason: HOSPADM

## 2023-10-31 RX ORDER — FAMOTIDINE 20 MG/1
20 TABLET, FILM COATED ORAL 2 TIMES DAILY
Status: DISCONTINUED | OUTPATIENT
Start: 2023-10-31 | End: 2023-11-02

## 2023-10-31 RX ORDER — ATORVASTATIN CALCIUM 40 MG/1
80 TABLET, FILM COATED ORAL DAILY
Status: DISCONTINUED | OUTPATIENT
Start: 2023-10-31 | End: 2023-11-07 | Stop reason: HOSPADM

## 2023-10-31 RX ORDER — NITROGLYCERIN 20 MG/100ML
0-400 INJECTION INTRAVENOUS CONTINUOUS
Status: DISCONTINUED | OUTPATIENT
Start: 2023-10-31 | End: 2023-11-02

## 2023-10-31 RX ORDER — SODIUM CHLORIDE 9 MG/ML
INJECTION, SOLUTION INTRAVENOUS CONTINUOUS
Status: ACTIVE | OUTPATIENT
Start: 2023-10-31 | End: 2023-10-31

## 2023-10-31 RX ORDER — ONDANSETRON 8 MG/1
8 TABLET, ORALLY DISINTEGRATING ORAL EVERY 8 HOURS PRN
Status: DISCONTINUED | OUTPATIENT
Start: 2023-10-31 | End: 2023-11-02

## 2023-10-31 RX ORDER — MAGNESIUM SULFATE HEPTAHYDRATE 40 MG/ML
2 INJECTION, SOLUTION INTRAVENOUS ONCE
Status: COMPLETED | OUTPATIENT
Start: 2023-10-31 | End: 2023-10-31

## 2023-10-31 RX ORDER — HEPARIN SODIUM 1000 [USP'U]/ML
INJECTION, SOLUTION INTRAVENOUS; SUBCUTANEOUS
Status: DISCONTINUED | OUTPATIENT
Start: 2023-10-31 | End: 2023-10-31 | Stop reason: HOSPADM

## 2023-10-31 RX ADMIN — MUPIROCIN: 20 OINTMENT TOPICAL at 09:10

## 2023-10-31 RX ADMIN — MAGNESIUM SULFATE HEPTAHYDRATE 2 G: 40 INJECTION, SOLUTION INTRAVENOUS at 02:10

## 2023-10-31 RX ADMIN — SODIUM CHLORIDE: 9 INJECTION, SOLUTION INTRAVENOUS at 11:10

## 2023-10-31 RX ADMIN — AMIODARONE HYDROCHLORIDE 0.5 MG/MIN: 1.8 INJECTION, SOLUTION INTRAVENOUS at 02:10

## 2023-10-31 RX ADMIN — AMIODARONE HYDROCHLORIDE 0.5 MG/MIN: 1.8 INJECTION, SOLUTION INTRAVENOUS at 01:10

## 2023-10-31 RX ADMIN — ASPIRIN 325 MG ORAL TABLET 325 MG: 325 PILL ORAL at 11:10

## 2023-10-31 RX ADMIN — METOPROLOL SUCCINATE 25 MG: 25 TABLET, EXTENDED RELEASE ORAL at 11:10

## 2023-10-31 RX ADMIN — MAGNESIUM SULFATE HEPTAHYDRATE 2 G: 40 INJECTION, SOLUTION INTRAVENOUS at 04:10

## 2023-10-31 RX ADMIN — POTASSIUM PHOSPHATE, MONOBASIC AND POTASSIUM PHOSPHATE, DIBASIC 20 MMOL: 224; 236 INJECTION, SOLUTION, CONCENTRATE INTRAVENOUS at 02:10

## 2023-10-31 RX ADMIN — FAMOTIDINE 20 MG: 20 TABLET ORAL at 09:10

## 2023-10-31 RX ADMIN — SODIUM BICARBONATE: 84 INJECTION, SOLUTION INTRAVENOUS at 11:10

## 2023-10-31 RX ADMIN — HYDROCODONE BITARTRATE AND ACETAMINOPHEN 1 TABLET: 5; 325 TABLET ORAL at 03:10

## 2023-10-31 RX ADMIN — CHLORHEXIDINE GLUCONATE 0.12% ORAL RINSE 15 ML: 1.2 LIQUID ORAL at 08:10

## 2023-10-31 RX ADMIN — SODIUM CHLORIDE: 9 INJECTION, SOLUTION INTRAVENOUS at 01:10

## 2023-10-31 RX ADMIN — LIDOCAINE 1 PATCH: 50 PATCH CUTANEOUS at 03:10

## 2023-10-31 RX ADMIN — NITROGLYCERIN 50 MCG/MIN: 20 INJECTION INTRAVENOUS at 11:10

## 2023-10-31 RX ADMIN — SODIUM CHLORIDE: 9 INJECTION, SOLUTION INTRAVENOUS at 09:10

## 2023-10-31 RX ADMIN — ATORVASTATIN CALCIUM 80 MG: 40 TABLET, FILM COATED ORAL at 11:10

## 2023-10-31 RX ADMIN — FAMOTIDINE 20 MG: 10 INJECTION, SOLUTION INTRAVENOUS at 08:10

## 2023-10-31 NOTE — NURSING
Received to room from cath lab s/p [facility] with impella placement. Right groin access site is dry and intact with a small amount of blood present at the insertion site. Neurovascular checks to RLE are WDL. Impella set at P-5. Knee immobilizer in place. Patient is awake,alert, and oriented. Vital signs are stable. Assuming care at this time.

## 2023-10-31 NOTE — INTERVAL H&P NOTE
The patient has been examined and the H&P has been reviewed:    I concur with the findings and no changes have occurred since H&P was written.  Has post cardiac arrest recurrent acs and nstemi requiring tridil and heparin for emergency lhc/ptca  Procedure risks, benefits and alternative options discussed and understood by patient/family.          Active Hospital Problems    Diagnosis  POA    *Cardiac arrest [I46.9]  Unknown    Non-traumatic rhabdomyolysis [M62.82]  Yes    Coronary artery disease involving native coronary artery of native heart [I25.10]  Yes    KAREN (acute kidney injury) [N17.9]  Yes    Primary hypertension [I10]  Yes    Hyperlipidemia [E78.5]  Yes    Hypogonadism in male [E29.1]  Yes    Ulcerative proctitis [K51.20]  Yes    Acute hypercapnic respiratory failure [J96.02]  Yes      Resolved Hospital Problems   No resolved problems to display.

## 2023-10-31 NOTE — SUBJECTIVE & OBJECTIVE
Objective:     Vital Signs (Most Recent):  Temp: 97.8 °F (36.6 °C) (10/31/23 0716)  Pulse: 91 (10/31/23 1201)  Resp: (!) 21 (10/31/23 1201)  BP: 110/64 (10/31/23 1201)  SpO2: 96 % (10/31/23 1201) Vital Signs (24h Range):  Temp:  [97.8 °F (36.6 °C)-100.6 °F (38.1 °C)] 97.8 °F (36.6 °C)  Pulse:  [] 91  Resp:  [7-29] 21  SpO2:  [92 %-100 %] 96 %  BP: (110-121)/(58-67) 110/64  Arterial Line BP: ()/(48-77) 119/67     Weight: 111.6 kg (246 lb)  Body mass index is 30.75 kg/m².      Intake/Output Summary (Last 24 hours) at 10/31/2023 1312  Last data filed at 10/31/2023 1200  Gross per 24 hour   Intake 1191.6 ml   Output 1475 ml   Net -283.4 ml        Physical Exam  Vitals reviewed.   Constitutional:       General: He is awake.      Appearance: He is well-developed.   HENT:      Head: Normocephalic and atraumatic.      Mouth/Throat:      Mouth: Mucous membranes are moist.      Pharynx: Oropharynx is clear.   Eyes:      Extraocular Movements: Extraocular movements intact.      Conjunctiva/sclera: Conjunctivae normal.   Cardiovascular:      Rate and Rhythm: Normal rate and regular rhythm.      Comments: Impella in place  Pulmonary:      Effort: Pulmonary effort is normal.      Breath sounds: No wheezing or rhonchi.      Comments: Room air  Abdominal:      General: There is no distension.      Palpations: Abdomen is soft.   Musculoskeletal:         General: No deformity.      Cervical back: Normal range of motion and neck supple.      Right lower leg: No edema.      Left lower leg: No edema.   Skin:     General: Skin is warm and dry.   Neurological:      General: No focal deficit present.      Mental Status: He is alert and oriented to person, place, and time.   Psychiatric:         Behavior: Behavior is cooperative.           Review of Systems   Constitutional:  Negative for chills and fever.   HENT:  Negative for congestion and sore throat.    Respiratory:  Negative for cough and shortness of breath.     Cardiovascular:  Negative for chest pain and leg swelling.   Gastrointestinal:  Negative for nausea and vomiting.   Genitourinary:  Negative for difficulty urinating and dysuria.   Musculoskeletal:  Negative for arthralgias and back pain.   Neurological:  Negative for dizziness and headaches.   Psychiatric/Behavioral:  Negative for sleep disturbance. The patient is not nervous/anxious.        Lines/Drains/Airways       Central Venous Catheter Line  Duration             Percutaneous Central Line Insertion/Assessment - Triple Lumen  10/28/23 0950 Internal Jugular Right 3 days              Drain  Duration                  Urethral Catheter 10/28/23 1318 Temperature probe 2 days              Arterial Line  Duration             Arterial Line 10/28/23 1830 Left Femoral 2 days              Line  Duration                  VAD 10/31/23 1025 Left ventricular assist device Impella <1 day              Peripheral Intravenous Line  Duration                  Peripheral IV - Single Lumen 10/28/23 0915 20 G Left;Posterior Forearm 3 days                    Significant Labs:    CBC/Anemia Profile:  Recent Labs   Lab 10/30/23  0357 10/30/23  2218 10/31/23  0540   WBC 10.46 10.30 9.93   HGB 17.9 14.7 13.6*   HCT 53.0 43.9 40.4    144* 159   MCV 90 89 89   RDW 13.5 13.6 13.5        Chemistries:  Recent Labs   Lab 10/29/23  2319 10/30/23  0744 10/30/23  1552 10/31/23  0540 10/31/23  0813    140  --   --  140   K 4.2 4.4  --   --  3.9    106  --   --  102   CO2 22* 19*  --   --  28   BUN 21 24*  --   --  20   CREATININE 1.3 1.7*  --   --  1.1   CALCIUM 8.0* 7.7*  --   --  8.0*   ALBUMIN  --  2.9*  --   --  2.6*   PROT  --  5.8*  --   --  5.3*   BILITOT  --  0.5  --   --  0.7   ALKPHOS  --  51*  --   --  39*   ALT  --  157*  --   --  142*   AST  --  193*  --   --  249*   MG 2.0 2.1 1.9 1.8  --    PHOS 3.7 3.8 3.5 1.7*  --        All pertinent labs within the past 24 hours have been reviewed.    Significant Imaging:  I  have reviewed all pertinent imaging results/findings within the past 24 hours.

## 2023-10-31 NOTE — ASSESSMENT & PLAN NOTE
presumed VT/VF arrest given 3 shocks, 3 epis, and amio bolus in the field  CT head on admit without acute findings  S/p TTM and extubated 10/30  Developed CP overnight 10/30 with trop > 50  LHC 10/31 with multi-vessel disease, impella placed

## 2023-10-31 NOTE — CONSULTS
[facility] - Intensive Care ([facility])  Cardiothoracic Surgery  Consult Note    Patient Name: Carlin Isaac  MRN: [id number]  Admission Date: 10/28/2023  Attending Physician: Marc Tate MD  Referring Provider: Self, Aaareferral    Patient information was obtained from ER records.     Consults  Subjective:     Principal Problem: Cardiac arrest    History of Present Illness: The patient is a 68-year-old male who was admitted to the hospital after out-of-hospital cardiac arrest.  The patient has hypertension, hypogonadism, Crohn's disease, coronary artery disease status post stent placement in 2007, cardiac catheterization in 2022 that shows left main disease, hyperlipidemia.  By report, the patient developed cardiac arrest while working out at [employer].  The patient was resuscitated with defibrillation, epinephrine and amiodarone prior to arriving in the hospital.  In the emergency room head CT was unremarkable.  Echocardiogram shows normal ejection fraction.  The patient was intubated and placed on a targeted temperature management.  Over the past 24 hours patient had recurrent episodes of chest pain with troponin elevation to 50.  Patient had cardiac catheterization done which showed severe multivessel coronary artery disease with distal left main stenosis and proximal RCA stenosis.  An Impella left ventricular assist device was then inserted in the cath lab.  The patient currently denies any chest pain or shortness of breath.      No current facility-administered medications on file prior to encounter.     Current Outpatient Medications on File Prior to Encounter   Medication Sig    hydroCHLOROthiazide (HYDRODIURIL) 25 MG tablet Take 25 mg by mouth once daily.    ibuprofen (ADVIL,MOTRIN) 800 MG tablet Take 800 mg by mouth 3 (three) times daily as needed.    isosorbide mononitrate (IMDUR) 30 MG 24 hr tablet Take 30 mg by mouth once daily.    lisinopriL (PRINIVIL,ZESTRIL) 40 MG tablet Take 40 mg by  mouth once daily.    metoprolol succinate (TOPROL-XL) 25 MG 24 hr tablet Take 25 mg by mouth once daily.    oxyCODONE-acetaminophen (PERCOCET)  mg per tablet Take 1 tablet by mouth every 6 (six) hours as needed.    rosuvastatin (CRESTOR) 20 MG tablet Take 20 mg by mouth once daily.    testosterone cypionate (DEPOTESTOTERONE CYPIONATE) 200 mg/mL injection Inject 200 mg into the muscle every 7 days.       Review of patient's allergies indicates:  No Known Allergies    Past Medical History:   Diagnosis Date    Kidney stone      No past surgical history on file.  Family History       Problem Relation (Age of Onset)    Prostate cancer Father          Tobacco Use    Smoking status: Never    Smokeless tobacco: Never   Substance and Sexual Activity    Alcohol use: Never    Drug use: Never    Sexual activity: Not on file     Review of Systems   Unable to perform ROS: Other     Objective:     Vital Signs (Most Recent):  Temp: 97.8 °F (36.6 °C) (10/31/23 0716)  Pulse: 91 (10/31/23 1201)  Resp: (!) 21 (10/31/23 1201)  BP: 110/64 (10/31/23 1201)  SpO2: 96 % (10/31/23 1201) Vital Signs (24h Range):  Temp:  [97.8 °F (36.6 °C)-100.6 °F (38.1 °C)] 97.8 °F (36.6 °C)  Pulse:  [] 91  Resp:  [7-29] 21  SpO2:  [92 %-100 %] 96 %  BP: (110-121)/(58-67) 110/64  Arterial Line BP: ()/(48-77) 119/67     Weight: 111.6 kg (246 lb)  Body mass index is 30.75 kg/m².    SpO2: 96 %        Intake/Output - Last 3 Shifts         10/29 0700  10/30 0659 10/30 0700  10/31 0659 10/31 0700  11/01 0659    P.O.   240    I.V. (mL/kg) 4422.2 (39.5) 1324.9 (11.8) 365.7 (3.3)    NG/GT 60      IV Piggyback 94.1 40     Total Intake(mL/kg) 4576.4 (40.9) 1364.9 (12.2) 605.7 (5.4)    Urine (mL/kg/hr) 3011 (1.1) 1650 (0.6) 305 (0.4)    Stool       Total Output 3011 1650 305    Net +1565.4 -285.1 +300.7                    Lines/Drains/Airways       Central Venous Catheter Line  Duration             Percutaneous Central Line  Insertion/Assessment - Triple Lumen  10/28/23 0950 Internal Jugular Right 3 days              Drain  Duration                  Urethral Catheter 10/28/23 1318 Temperature probe 3 days              Arterial Line  Duration             Arterial Line 10/28/23 1830 Left Femoral 2 days              Line  Duration                  VAD 10/31/23 1025 Left ventricular assist device Impella <1 day              Peripheral Intravenous Line  Duration                  Peripheral IV - Single Lumen 10/28/23 0915 20 G Left;Posterior Forearm 3 days                     STS Risk Score:   Procedure Type: Isolated CABG  Operative Mortality 2.9%  Morbidity & Mortality 15%  Stroke 1.26%  Renal Failure 1.4%  Reoperation 2.3%  Prolonged Ventilation 9.14%  Deep Sternal Wound Infection 0.141%  Long Hospital Stay (>14 days) 5.82%  Short Hospital Stay (<6 days)* 33.6%       Physical Exam  Constitutional:       Appearance: Normal appearance. He is not ill-appearing.      Comments: Patient appears drowsy but easily arousable.   HENT:      Head: Normocephalic and atraumatic.      Nose: Nose normal.      Mouth/Throat:      Mouth: Mucous membranes are moist.   Eyes:      Extraocular Movements: Extraocular movements intact.      Conjunctiva/sclera: Conjunctivae normal.      Pupils: Pupils are equal, round, and reactive to light.   Cardiovascular:      Rate and Rhythm: Normal rate and regular rhythm.      Heart sounds: Normal heart sounds.   Pulmonary:      Effort: Pulmonary effort is normal.      Breath sounds: Normal breath sounds.   Abdominal:      General: Abdomen is flat. Bowel sounds are normal.      Palpations: Abdomen is soft.   Musculoskeletal:      Right lower leg: No edema.      Left lower leg: No edema.   Skin:     General: Skin is warm and dry.   Neurological:      General: No focal deficit present.      Comments: Patient is drowsy.  Patient is is the arousable follows all commands appropriately.   Psychiatric:         Behavior: Behavior  normal.            Significant Labs:  All pertinent labs from the last 24 hours have been reviewed.    Significant Diagnostics:  I have reviewed all pertinent imaging results/findings within the past 24 hours.      Assessment/Plan:     NYHA Score: NYHA IV: inability to carry on any physical activity without discomfort    * Cardiac arrest  The patient is a 68-year-old male with coronary artery disease who is status post cardiac arrest.  Cardiac catheterization shows severe multivessel coronary artery disease with left main stenosis.  An Impella ventricular assist device was placed in the cath lab.  The patient is a candidate for urgent coronary artery bypass grafting.  The plan is to rest the patient on the Impella over the next 24 hours.  Coronary artery bypass grafting will be scheduled for 11/02/2023.        Thank you for your consult. I will follow-up with patient. Please contact us if you have any additional questions.    Oscar Cazares MD  Cardiothoracic Surgery  [facility] - Intensive Care ([facility])

## 2023-10-31 NOTE — SIGNIFICANT EVENT
Notified at 2030 of pt c/o chest pain. Asking for nitroglycerin  - 12 Lead EKG, no STEMI, note new QTc prolongation  SL nitro x 1  with positive response    2200 troponin result noted >50.0 (last was today 0744 - 5.846)  Called to review with Cardiology who recommends initiating heparin infusion, tridil infusion, and IV lopressor x 1  Currently CP free, Will notify if cardiology if unable to keep CP free with tridil infusion.    Beba Mann, [facility] HospitalJARVIS-BC  Critical Care Medicine  [facility]

## 2023-10-31 NOTE — OP NOTE
INPATIENT Operative Note         SUMMARY     Surgery Date: 10/31/2023     Surgeon(s) and Role:     * Baldev Mathew MD - Primary    ASSISTANT:NONE    Pre-op Diagnosis:  Cardiac arrest [I46.9]      Post-op Diagnosis:  Cardiac arrest [I46.9]    Procedure(s) (LRB):  Left heart cath, with possible Impella (Left)  INSERTION, IMPELLA (N/A)    COMPLICATION:NONE    Anesthesia: RN IV Sedation    Findings/Key Components:  LVEDP 7 MMHG  DISTAL LMCA 80% CALCIFIED HAZZY EXTENDING INTO LAD OSTIUM AND LCX PROX  LARGE D1 NON OBS DISEASE.  PATENT LAD STENT   LEFT DOMINANT SYSTEM  RCA 90% PROX WITH LARGE RV BRANCH GIVING COLLATERALS TO DISTAL LAD.     CVP 5 MMHG   Estimated Blood Loss: < 50 ML.         SPECIMEN: NONE    Devices/Prostetics: None    PLAN:   IMPELLA SUPPORT TO REST FOR 24 HRS THEN PLAN CABG CASE DISCUSSED WITH SURGERY DRS AWOLESI AND LUCY.

## 2023-10-31 NOTE — PLAN OF CARE
Patient laying in bed with safety and fall prevention measures are in place. Current vital signs are stable. Right femoral access site remains dry and intact with small amount of bloody drainage at the site. Impella remains at p-5. Knee immobilizer in place. Heparin infusion resumed per orders from Dr. Mathew. Plan of care discussed with the patient and significant other at bedside. Verbalized understanding. Denies chest pain at present. Care ongoing.

## 2023-10-31 NOTE — H&P (VIEW-ONLY)
[facility] - [facility]ensive Care ([facility])  Cardiothoracic Surgery  Consult Note    Patient Name: Carlin Isaac  MRN: [id number]  Admission Date: 10/28/2023  Attending Physician: Marc Tate MD  Referring Provider: Self, Aaareferral    Patient information was obtained from ER records.     Consults  Subjective:     Principal Problem: Cardiac arrest    History of Present Illness: The patient is a 68-year-old male who was admitted to the hospital after out-of-hospital cardiac arrest.  The patient has hypertension, hypogonadism, Crohn's disease, coronary artery disease status post stent placement in 2007, cardiac catheterization in 2022 that shows left main disease, hyperlipidemia.  By report, the patient developed cardiac arrest while working out at [employer].  The patient was resuscitated with defibrillation, epinephrine and amiodarone prior to arriving in the hospital.  In the emergency room head CT was unremarkable.  Echocardiogram shows normal ejection fraction.  The patient was intubated and placed on a targeted temperature management.  Over the past 24 hours patient had recurrent episodes of chest pain with troponin elevation to 50.  Patient had cardiac catheterization done which showed severe multivessel coronary artery disease with distal left main stenosis and proximal RCA stenosis.  An Impella left ventricular assist device was then inserted in the cath lab.  The patient currently denies any chest pain or shortness of breath.      No current facility-administered medications on file prior to encounter.     Current Outpatient Medications on File Prior to Encounter   Medication Sig    hydroCHLOROthiazide (HYDRODIURIL) 25 MG tablet Take 25 mg by mouth once daily.    ibuprofen (ADVIL,MOTRIN) 800 MG tablet Take 800 mg by mouth 3 (three) times daily as needed.    isosorbide mononitrate (IMDUR) 30 MG 24 hr tablet Take 30 mg by mouth once daily.    lisinopriL (PRINIVIL,ZESTRIL) 40 MG tablet Take 40 mg by  mouth once daily.    metoprolol succinate (TOPROL-XL) 25 MG 24 hr tablet Take 25 mg by mouth once daily.    oxyCODONE-acetaminophen (PERCOCET)  mg per tablet Take 1 tablet by mouth every 6 (six) hours as needed.    rosuvastatin (CRESTOR) 20 MG tablet Take 20 mg by mouth once daily.    testosterone cypionate (DEPOTESTOTERONE CYPIONATE) 200 mg/mL injection Inject 200 mg into the muscle every 7 days.       Review of patient's allergies indicates:  No Known Allergies    Past Medical History:   Diagnosis Date    Kidney stone      No past surgical history on file.  Family History       Problem Relation (Age of Onset)    Prostate cancer Father          Tobacco Use    Smoking status: Never    Smokeless tobacco: Never   Substance and Sexual Activity    Alcohol use: Never    Drug use: Never    Sexual activity: Not on file     Review of Systems   Unable to perform ROS: Other     Objective:     Vital Signs (Most Recent):  Temp: 97.8 °F (36.6 °C) (10/31/23 0716)  Pulse: 91 (10/31/23 1201)  Resp: (!) 21 (10/31/23 1201)  BP: 110/64 (10/31/23 1201)  SpO2: 96 % (10/31/23 1201) Vital Signs (24h Range):  Temp:  [97.8 °F (36.6 °C)-100.6 °F (38.1 °C)] 97.8 °F (36.6 °C)  Pulse:  [] 91  Resp:  [7-29] 21  SpO2:  [92 %-100 %] 96 %  BP: (110-121)/(58-67) 110/64  Arterial Line BP: ()/(48-77) 119/67     Weight: 111.6 kg (246 lb)  Body mass index is 30.75 kg/m².    SpO2: 96 %        Intake/Output - Last 3 Shifts         10/29 0700  10/30 0659 10/30 0700  10/31 0659 10/31 0700  11/01 0659    P.O.   240    I.V. (mL/kg) 4422.2 (39.5) 1324.9 (11.8) 365.7 (3.3)    NG/GT 60      IV Piggyback 94.1 40     Total Intake(mL/kg) 4576.4 (40.9) 1364.9 (12.2) 605.7 (5.4)    Urine (mL/kg/hr) 3011 (1.1) 1650 (0.6) 305 (0.4)    Stool       Total Output 3011 1650 305    Net +1565.4 -285.1 +300.7                    Lines/Drains/Airways       Central Venous Catheter Line  Duration             Percutaneous Central Line  Insertion/Assessment - Triple Lumen  10/28/23 0950 Internal Jugular Right 3 days              Drain  Duration                  Urethral Catheter 10/28/23 1318 Temperature probe 3 days              Arterial Line  Duration             Arterial Line 10/28/23 1830 Left Femoral 2 days              Line  Duration                  VAD 10/31/23 1025 Left ventricular assist device Impella <1 day              Peripheral Intravenous Line  Duration                  Peripheral IV - Single Lumen 10/28/23 0915 20 G Left;Posterior Forearm 3 days                     STS Risk Score:   Procedure Type: Isolated CABG  Operative Mortality 2.9%  Morbidity & Mortality 15%  Stroke 1.26%  Renal Failure 1.4%  Reoperation 2.3%  Prolonged Ventilation 9.14%  Deep Sternal Wound Infection 0.141%  Long Hospital Stay (>14 days) 5.82%  Short Hospital Stay (<6 days)* 33.6%       Physical Exam  Constitutional:       Appearance: Normal appearance. He is not ill-appearing.      Comments: Patient appears drowsy but easily arousable.   HENT:      Head: Normocephalic and atraumatic.      Nose: Nose normal.      Mouth/Throat:      Mouth: Mucous membranes are moist.   Eyes:      Extraocular Movements: Extraocular movements intact.      Conjunctiva/sclera: Conjunctivae normal.      Pupils: Pupils are equal, round, and reactive to light.   Cardiovascular:      Rate and Rhythm: Normal rate and regular rhythm.      Heart sounds: Normal heart sounds.   Pulmonary:      Effort: Pulmonary effort is normal.      Breath sounds: Normal breath sounds.   Abdominal:      General: Abdomen is flat. Bowel sounds are normal.      Palpations: Abdomen is soft.   Musculoskeletal:      Right lower leg: No edema.      Left lower leg: No edema.   Skin:     General: Skin is warm and dry.   Neurological:      General: No focal deficit present.      Comments: Patient is drowsy.  Patient is is the arousable follows all commands appropriately.   Psychiatric:         Behavior: Behavior  normal.            Significant Labs:  All pertinent labs from the last 24 hours have been reviewed.    Significant Diagnostics:  I have reviewed all pertinent imaging results/findings within the past 24 hours.      Assessment/Plan:     NYHA Score: NYHA IV: inability to carry on any physical activity without discomfort    * Cardiac arrest  The patient is a 68-year-old male with coronary artery disease who is status post cardiac arrest.  Cardiac catheterization shows severe multivessel coronary artery disease with left main stenosis.  An Impella ventricular assist device was placed in the cath lab.  The patient is a candidate for urgent coronary artery bypass grafting.  The plan is to rest the patient on the Impella over the next 24 hours.  Coronary artery bypass grafting will be scheduled for 11/02/2023.        Thank you for your consult. I will follow-up with patient. Please contact us if you have any additional questions.    Oscar Cazares MD  Cardiothoracic Surgery  [facility] - Intensive Care ([facility])

## 2023-10-31 NOTE — SUBJECTIVE & OBJECTIVE
No current facility-administered medications on file prior to encounter.     Current Outpatient Medications on File Prior to Encounter   Medication Sig    hydroCHLOROthiazide (HYDRODIURIL) 25 MG tablet Take 25 mg by mouth once daily.    ibuprofen (ADVIL,MOTRIN) 800 MG tablet Take 800 mg by mouth 3 (three) times daily as needed.    isosorbide mononitrate (IMDUR) 30 MG 24 hr tablet Take 30 mg by mouth once daily.    lisinopriL (PRINIVIL,ZESTRIL) 40 MG tablet Take 40 mg by mouth once daily.    metoprolol succinate (TOPROL-XL) 25 MG 24 hr tablet Take 25 mg by mouth once daily.    oxyCODONE-acetaminophen (PERCOCET)  mg per tablet Take 1 tablet by mouth every 6 (six) hours as needed.    rosuvastatin (CRESTOR) 20 MG tablet Take 20 mg by mouth once daily.    testosterone cypionate (DEPOTESTOTERONE CYPIONATE) 200 mg/mL injection Inject 200 mg into the muscle every 7 days.       Review of patient's allergies indicates:  No Known Allergies    Past Medical History:   Diagnosis Date    Kidney stone      No past surgical history on file.  Family History       Problem Relation (Age of Onset)    Prostate cancer Father          Tobacco Use    Smoking status: Never    Smokeless tobacco: Never   Substance and Sexual Activity    Alcohol use: Never    Drug use: Never    Sexual activity: Not on file     Review of Systems   Unable to perform ROS: Other     Objective:     Vital Signs (Most Recent):  Temp: 97.8 °F (36.6 °C) (10/31/23 0716)  Pulse: 91 (10/31/23 1201)  Resp: (!) 21 (10/31/23 1201)  BP: 110/64 (10/31/23 1201)  SpO2: 96 % (10/31/23 1201) Vital Signs (24h Range):  Temp:  [97.8 °F (36.6 °C)-100.6 °F (38.1 °C)] 97.8 °F (36.6 °C)  Pulse:  [] 91  Resp:  [7-29] 21  SpO2:  [92 %-100 %] 96 %  BP: (110-121)/(58-67) 110/64  Arterial Line BP: ()/(48-77) 119/67     Weight: 111.6 kg (246 lb)  Body mass index is 30.75 kg/m².    SpO2: 96 %        Intake/Output - Last 3 Shifts         10/29 0700  10/30 0659 10/30 0700  10/31  0659 10/31 0700  11/01 0659    P.O.   240    I.V. (mL/kg) 4422.2 (39.5) 1324.9 (11.8) 365.7 (3.3)    NG/GT 60      IV Piggyback 94.1 40     Total Intake(mL/kg) 4576.4 (40.9) 1364.9 (12.2) 605.7 (5.4)    Urine (mL/kg/hr) 3011 (1.1) 1650 (0.6) 305 (0.4)    Stool       Total Output 3011 1650 305    Net +1565.4 -285.1 +300.7                    Lines/Drains/Airways       Central Venous Catheter Line  Duration             Percutaneous Central Line Insertion/Assessment - Triple Lumen  10/28/23 0950 Internal Jugular Right 3 days              Drain  Duration                  Urethral Catheter 10/28/23 1318 Temperature probe 3 days              Arterial Line  Duration             Arterial Line 10/28/23 1830 Left Femoral 2 days              Line  Duration                  VAD 10/31/23 1025 Left ventricular assist device Impella <1 day              Peripheral Intravenous Line  Duration                  Peripheral IV - Single Lumen 10/28/23 0915 20 G Left;Posterior Forearm 3 days                     STS Risk Score:   Procedure Type: Isolated CABG  Operative Mortality 2.9%  Morbidity & Mortality 15%  Stroke 1.26%  Renal Failure 1.4%  Reoperation 2.3%  Prolonged Ventilation 9.14%  Deep Sternal Wound Infection 0.141%  Long Hospital Stay (>14 days) 5.82%  Short Hospital Stay (<6 days)* 33.6%       Physical Exam  Constitutional:       Appearance: Normal appearance. He is not ill-appearing.      Comments: Patient appears drowsy but easily arousable.   HENT:      Head: Normocephalic and atraumatic.      Nose: Nose normal.      Mouth/Throat:      Mouth: Mucous membranes are moist.   Eyes:      Extraocular Movements: Extraocular movements intact.      Conjunctiva/sclera: Conjunctivae normal.      Pupils: Pupils are equal, round, and reactive to light.   Cardiovascular:      Rate and Rhythm: Normal rate and regular rhythm.      Heart sounds: Normal heart sounds.   Pulmonary:      Effort: Pulmonary effort is normal.      Breath sounds:  Normal breath sounds.   Abdominal:      General: Abdomen is flat. Bowel sounds are normal.      Palpations: Abdomen is soft.   Musculoskeletal:      Right lower leg: No edema.      Left lower leg: No edema.   Skin:     General: Skin is warm and dry.   Neurological:      General: No focal deficit present.      Comments: Patient is drowsy.  Patient is is the arousable follows all commands appropriately.   Psychiatric:         Behavior: Behavior normal.            Significant Labs:  All pertinent labs from the last 24 hours have been reviewed.    Significant Diagnostics:  I have reviewed all pertinent imaging results/findings within the past 24 hours.

## 2023-10-31 NOTE — NURSING
Patient sitting up in bed with safety and fall prevention measures are in place. Current vital signs are stable. Plan of care discussed with the patient and significant other. Tolerating nasal cannula without issues.

## 2023-10-31 NOTE — ASSESSMENT & PLAN NOTE
h/o PCI in 2007  LHC in 2022 with multi-vessel disease and pt declined CABG unless CP returned  troponin peaked at 6.192 prior to downtrending; CP returned 10/30 overnight with trop > 50  LHC 10/31 with multi-vessel disease  impella in place  CTS consulted with plans for CABG in the coming days

## 2023-10-31 NOTE — ASSESSMENT & PLAN NOTE
The patient is a 68-year-old male with coronary artery disease who is status post cardiac arrest.  Cardiac catheterization shows severe multivessel coronary artery disease with left main stenosis.  An Impella ventricular assist device was placed in the cath lab.  The patient is a candidate for urgent coronary artery bypass grafting.  The plan is to rest the patient on the Impella over the next 24 hours.  Coronary artery bypass grafting will be scheduled for 11/02/2023.

## 2023-10-31 NOTE — ASSESSMENT & PLAN NOTE
Patient with Hypercapnic Respiratory failure which is Acute.  he is not on home oxygen. Supplemental oxygen was provided and noted- Vent Mode: Spont  Oxygen Concentration (%):  [30-36] 36  Resp Rate Total:  [13 br/min-24 br/min] 13 br/min  PEEP/CPAP:  [5 cmH20] 5 cmH20  Pressure Support:  [7 cmH20] 7 cmH20  Mean Airway Pressure:  [4.4 cmH20] 4.4 cmH20    .   Signs/symptoms of respiratory failure include- lethargy. Contributing diagnoses includes - cardiac arrest Labs and images were reviewed. Patient Has recent ABG, which has been reviewed. Will treat underlying causes and adjust management of respiratory failure as follows - ventilator support    intubated 10/28  Extubated to room air 10/30

## 2023-10-31 NOTE — PROGRESS NOTES
[facility] - [facility] ([facility])  Cardiology  Progress Note    Patient Name: Carlin Isaac  MRN: [id number]  Admission Date: 10/28/2023  Hospital Length of Stay: 3 days  Code Status: Full Code   Attending Physician: Marc Tate MD   Primary Care Physician: Eliot Ambriz MD  Expected Discharge Date:   Principal Problem:Cardiac arrest    Subjective:     Hospital Course:   10/29/2023: The patient is supported intubated stable.  Cardiac echo shows normal LV function troponin levels are elevated EKG shows ST segments have reverted back to normal sinus rhythm with normal intervals and no evidence of further ST changes.  Putting the wife the patient has significant coronary disease and had refused bypass surgery last year.  Will plan another heart catheterization after he is wakeful and extubated.  Clinically stable this time with supportive care.    10/30/23   Intubated, NSR, some nonsustained tachyrhythmia overnight.Pt on Levo, Amio,.     10/31/23 CP last night requiring tridil, troponin > 50, no CP on exam. Discussed cath with pt and agrees. Will schedule with Dr. Mathew      Interval History:     Review of Systems   Constitutional: Negative. Negative for weight gain.   HENT: Negative.     Eyes: Negative.    Cardiovascular: Negative.  Negative for chest pain, leg swelling and palpitations.   Respiratory: Negative.  Negative for shortness of breath.    Endocrine: Negative.    Hematologic/Lymphatic: Negative.    Skin: Negative.    Musculoskeletal:  Negative for muscle weakness.   Gastrointestinal: Negative.    Genitourinary: Negative.    Neurological: Negative.  Negative for dizziness.   Psychiatric/Behavioral: Negative.     Allergic/Immunologic: Negative.    All other systems reviewed and are negative.    Objective:     Vital Signs (Most Recent):  Temp: 97.8 °F (36.6 °C) (10/31/23 0716)  Pulse: 97 (10/31/23 0759)  Resp: (Abnormal) 29 (10/31/23 0759)  BP: 121/67 (10/31/23 0716)  SpO2: 100 % (10/31/23  0759) Vital Signs (24h Range):  Temp:  [96.6 °F (35.9 °C)-100.6 °F (38.1 °C)] 97.8 °F (36.6 °C)  Pulse:  [] 97  Resp:  [5-29] 29  SpO2:  [64 %-100 %] 100 %  BP: (114-129)/(58-67) 121/67  Arterial Line BP: ()/(48-77) 104/57     Weight: 112 kg (246 lb 14.6 oz)  Body mass index is 30.86 kg/m².     SpO2: 100 %         Intake/Output Summary (Last 24 hours) at 10/31/2023 0813  Last data filed at 10/31/2023 0700  Gross per 24 hour   Intake 1115.59 ml   Output 1670 ml   Net -554.41 ml       Lines/Drains/Airways       Central Venous Catheter Line       Name Duration    Percutaneous Central Line Insertion/Assessment - Triple Lumen  10/28/23 0950 Internal Jugular Right 2 days              Drain       Name Duration         Urethral Catheter 10/28/23 1318 Temperature probe 2 days              Arterial Line       Name Duration    Arterial Line 10/28/23 1830 Left Femoral 2 days              Peripheral Intravenous Line       Name Duration         Peripheral IV - Single Lumen 10/28/23 0915 20 G Left;Posterior Forearm 2 days                       Physical Exam  Vitals and nursing note reviewed.   Constitutional:       Appearance: He is well-developed.   HENT:      Head: Normocephalic and atraumatic.   Eyes:      Conjunctiva/sclera: Conjunctivae normal.      Pupils: Pupils are equal, round, and reactive to light.   Cardiovascular:      Rate and Rhythm: Normal rate and regular rhythm.      Pulses: Intact distal pulses.      Heart sounds: Normal heart sounds.   Pulmonary:      Effort: Pulmonary effort is normal.      Breath sounds: Normal breath sounds.   Abdominal:      General: Bowel sounds are normal.      Palpations: Abdomen is soft.   Musculoskeletal:      Cervical back: Normal range of motion and neck supple.   Skin:     General: Skin is warm and dry.   Neurological:      Mental Status: He is alert and oriented to person, place, and time.            Significant Labs: All pertinent lab results from the last 24 hours  have been reviewed.    Significant Imaging: X-Ray: CXR: X-Ray Chest 1 View (CXR): No results found for this visit on 10/28/23.    Assessment and Plan:     Brief HPI:     * Cardiac arrest  Will continue supportive care  Continue amiodarone  Continue enoxaparin  Add BB if needed    Coronary artery disease involving native coronary artery of native heart  Significant CAD, EKG shows anterior and lateral ischemia  Plan nitrates if possible  1 st troponin negative and will trend  Likely cardiac event was VT  Cardiac echo shows  Preserved LV function        VTE Risk Mitigation (From admission, onward)         Ordered     heparin 25,000 units in dextrose 5% (100 units/ml) IV bolus from bag - ADDITIONAL PRN BOLUS - 30 units/kg (max bolus 4000 units)  As needed (PRN)        Question:  Heparin Infusion Adjustment (DO NOT MODIFY ANSWER)  Answer:  \\[email or web address]\epic\Images\Pharmacy\HeparinInfusions\heparin LOW INTENSITY nomogram for [facility] [id number].pdf    10/31/23 0001     heparin 25,000 units in dextrose 5% (100 units/ml) IV bolus from bag - ADDITIONAL PRN BOLUS - 60 units/kg (max bolus 4000 units)  As needed (PRN)        Question:  Heparin Infusion Adjustment (DO NOT MODIFY ANSWER)  Answer:  \\[email or web address]\epic\Images\Pharmacy\HeparinInfusions\heparin LOW INTENSITY nomogram for [facility] [id number].pdf    10/30/23 2212     heparin 25,000 units in dextrose 5% (100 units/ml) IV bolus from bag INITIAL BOLUS (max bolus 4000 units)  Once        Question:  Heparin Infusion Adjustment (DO NOT MODIFY ANSWER)  Answer:  \\[email or web address]\epic\Images\Pharmacy\HeparinInfusions\heparin LOW INTENSITY nomogram for [facility] [id number].pdf    10/30/23 2212     heparin 25,000 units in dextrose 5% 250 mL (100 units/mL) infusion LOW INTENSITY nomogram - [facility]  Continuous        Question:  Begin at (units/kg/hr)  Answer:  12    10/30/23 2212     IP VTE HIGH RISK PATIENT  Once         10/28/23 1229     Place sequential compression device  Until discontinued         10/28/23 1223                 Jared Nieto MD  Cardiology  [facility] - Intensive [facility] ([facility])

## 2023-10-31 NOTE — PLAN OF CARE
Patient awake, alert and oriented to person, place and time  Nasal cannula 4 liters  R IJ TLC with heparin, amiodarone and nitro infusing  Varela catheter with good urine output  L fem art line    Patient complained of chest pain during shift, given sublingual nitro and nitro drip started in addition to EKG and troponin.

## 2023-10-31 NOTE — SUBJECTIVE & OBJECTIVE
Interval History:     Review of Systems   Constitutional: Negative. Negative for weight gain.   HENT: Negative.     Eyes: Negative.    Cardiovascular: Negative.  Negative for chest pain, leg swelling and palpitations.   Respiratory: Negative.  Negative for shortness of breath.    Endocrine: Negative.    Hematologic/Lymphatic: Negative.    Skin: Negative.    Musculoskeletal:  Negative for muscle weakness.   Gastrointestinal: Negative.    Genitourinary: Negative.    Neurological: Negative.  Negative for dizziness.   Psychiatric/Behavioral: Negative.     Allergic/Immunologic: Negative.    All other systems reviewed and are negative.    Objective:     Vital Signs (Most Recent):  Temp: 97.8 °F (36.6 °C) (10/31/23 0716)  Pulse: 97 (10/31/23 0759)  Resp: (Abnormal) 29 (10/31/23 0759)  BP: 121/67 (10/31/23 0716)  SpO2: 100 % (10/31/23 0759) Vital Signs (24h Range):  Temp:  [96.6 °F (35.9 °C)-100.6 °F (38.1 °C)] 97.8 °F (36.6 °C)  Pulse:  [] 97  Resp:  [5-29] 29  SpO2:  [64 %-100 %] 100 %  BP: (114-129)/(58-67) 121/67  Arterial Line BP: ()/(48-77) 104/57     Weight: 112 kg (246 lb 14.6 oz)  Body mass index is 30.86 kg/m².     SpO2: 100 %         Intake/Output Summary (Last 24 hours) at 10/31/2023 0813  Last data filed at 10/31/2023 0700  Gross per 24 hour   Intake 1115.59 ml   Output 1670 ml   Net -554.41 ml       Lines/Drains/Airways       Central Venous Catheter Line       Name Duration    Percutaneous Central Line Insertion/Assessment - Triple Lumen  10/28/23 0950 Internal Jugular Right 2 days              Drain       Name Duration         Urethral Catheter 10/28/23 1318 Temperature probe 2 days              Arterial Line       Name Duration    Arterial Line 10/28/23 1830 Left Femoral 2 days              Peripheral Intravenous Line       Name Duration         Peripheral IV - Single Lumen 10/28/23 0915 20 G Left;Posterior Forearm 2 days                       Physical Exam  Vitals and nursing note reviewed.    Constitutional:       Appearance: He is well-developed.   HENT:      Head: Normocephalic and atraumatic.   Eyes:      Conjunctiva/sclera: Conjunctivae normal.      Pupils: Pupils are equal, round, and reactive to light.   Cardiovascular:      Rate and Rhythm: Normal rate and regular rhythm.      Pulses: Intact distal pulses.      Heart sounds: Normal heart sounds.   Pulmonary:      Effort: Pulmonary effort is normal.      Breath sounds: Normal breath sounds.   Abdominal:      General: Bowel sounds are normal.      Palpations: Abdomen is soft.   Musculoskeletal:      Cervical back: Normal range of motion and neck supple.   Skin:     General: Skin is warm and dry.   Neurological:      Mental Status: He is alert and oriented to person, place, and time.            Significant Labs: All pertinent lab results from the last 24 hours have been reviewed.    Significant Imaging: X-Ray: CXR: X-Ray Chest 1 View (CXR): No results found for this visit on 10/28/23.

## 2023-10-31 NOTE — PROGRESS NOTES
[facility]  Critical Care Medicine  Progress Note    Patient Name: Carlin Isaac  MRN: [id number]  Admission Date: 10/28/2023  Hospital Length of Stay: 3 days  Code Status: Full Code  Attending Provider: Marc Tate MD  Primary Care Provider: Eliot Ambriz MD   Principal Problem: Cardiac arrest    Subjective:     HPI:  Mr Isaac is a 67 y/o man with HTN, hypogonadism on chonic testosterone, Chrohn's dz/Ulcerative proctitis, CAD s/p stent to the proximal/mid LAD in 2007, and HLD who presents to the ER today after an out of hospital cardiac arrest.  History taken from the medical record and discussion with staff as patient is unable to provide history and no family at bedside during my exam.  Reportedly, he was working out at [employer] when he suddenly collapsed.  EMS administered 3 shocks, 3 epis, and amio bolus en route.  Presumed rythm was VF.    Of note, he had angina in Sept 2022 and [facility] with multi-vessel disease.  He was recommended for CABG, but he declined at the time as his angina had resolved.    In the ER, lab work notable for INR 1.3, Cr 1.9, mildly elevated LFTs,  with negative initial troponin.  Echo fairly unremarkable.  He is intubated and sedated.  TTM in place and will change goal to 36.  Currently on Levo for vasopressor support through CVL placed by ER.      Hospital/ICU Course:  10/30: HR and BP labile overnight with tachy-manuel rhythm requiring adjustment of drips. Levophed, Amiodarone, fentanyl and propofol infusing. Completed TTM yesterday afternoon        Objective:     Vital Signs (Most Recent):  Temp: 97.8 °F (36.6 °C) (10/31/23 0716)  Pulse: 91 (10/31/23 1201)  Resp: (!) 21 (10/31/23 1201)  BP: 110/64 (10/31/23 1201)  SpO2: 96 % (10/31/23 1201) Vital Signs (24h Range):  Temp:  [97.8 °F (36.6 °C)-100.6 °F (38.1 °C)] 97.8 °F (36.6 °C)  Pulse:  [] 91  Resp:  [7-29] 21  SpO2:  [92 %-100 %] 96 %  BP: (110-121)/(58-67) 110/64  Arterial  Line BP: ()/(48-77) 119/67     Weight: 111.6 kg (246 lb)  Body mass index is 30.75 kg/m².      Intake/Output Summary (Last 24 hours) at 10/31/2023 1312  Last data filed at 10/31/2023 1200  Gross per 24 hour   Intake 1191.6 ml   Output 1475 ml   Net -283.4 ml        Physical Exam  Vitals reviewed.   Constitutional:       General: He is awake.      Appearance: He is well-developed.   HENT:      Head: Normocephalic and atraumatic.      Mouth/Throat:      Mouth: Mucous membranes are moist.      Pharynx: Oropharynx is clear.   Eyes:      Extraocular Movements: Extraocular movements intact.      Conjunctiva/sclera: Conjunctivae normal.   Cardiovascular:      Rate and Rhythm: Normal rate and regular rhythm.      Comments: Impella in place  Pulmonary:      Effort: Pulmonary effort is normal.      Breath sounds: No wheezing or rhonchi.      Comments: Room air  Abdominal:      General: There is no distension.      Palpations: Abdomen is soft.   Musculoskeletal:         General: No deformity.      Cervical back: Normal range of motion and neck supple.      Right lower leg: No edema.      Left lower leg: No edema.   Skin:     General: Skin is warm and dry.   Neurological:      General: No focal deficit present.      Mental Status: He is alert and oriented to person, place, and time.   Psychiatric:         Behavior: Behavior is cooperative.           Review of Systems   Constitutional:  Negative for chills and fever.   HENT:  Negative for congestion and sore throat.    Respiratory:  Negative for cough and shortness of breath.    Cardiovascular:  Negative for chest pain and leg swelling.   Gastrointestinal:  Negative for nausea and vomiting.   Genitourinary:  Negative for difficulty urinating and dysuria.   Musculoskeletal:  Negative for arthralgias and back pain.   Neurological:  Negative for dizziness and headaches.   Psychiatric/Behavioral:  Negative for sleep disturbance. The patient is not nervous/anxious.         Lines/Drains/Airways       Central Venous Catheter Line  Duration             Percutaneous Central Line Insertion/Assessment - Triple Lumen  10/28/23 0950 Internal Jugular Right 3 days              Drain  Duration                  Urethral Catheter 10/28/23 1318 Temperature probe 2 days              Arterial Line  Duration             Arterial Line 10/28/23 1830 Left Femoral 2 days              Line  Duration                  VAD 10/31/23 1025 Left ventricular assist device Impella <1 day              Peripheral Intravenous Line  Duration                  Peripheral IV - Single Lumen 10/28/23 0915 20 G Left;Posterior Forearm 3 days                    Significant Labs:    CBC/Anemia Profile:  Recent Labs   Lab 10/30/23  0357 10/30/23  2218 10/31/23  0540   WBC 10.46 10.30 9.93   HGB 17.9 14.7 13.6*   HCT 53.0 43.9 40.4    144* 159   MCV 90 89 89   RDW 13.5 13.6 13.5        Chemistries:  Recent Labs   Lab 10/29/23  2319 10/30/23  0744 10/30/23  1552 10/31/23  0540 10/31/23  0813    140  --   --  140   K 4.2 4.4  --   --  3.9    106  --   --  102   CO2 22* 19*  --   --  28   BUN 21 24*  --   --  20   CREATININE 1.3 1.7*  --   --  1.1   CALCIUM 8.0* 7.7*  --   --  8.0*   ALBUMIN  --  2.9*  --   --  2.6*   PROT  --  5.8*  --   --  5.3*   BILITOT  --  0.5  --   --  0.7   ALKPHOS  --  51*  --   --  39*   ALT  --  157*  --   --  142*   AST  --  193*  --   --  249*   MG 2.0 2.1 1.9 1.8  --    PHOS 3.7 3.8 3.5 1.7*  --        All pertinent labs within the past 24 hours have been reviewed.    Significant Imaging:  I have reviewed all pertinent imaging results/findings within the past 24 hours.      ABG  Recent Labs   Lab 10/30/23  0359   PH 7.281*   PO2 89   PCO2 48.4*   HCO3 22.8*   BE -4*     Assessment/Plan:     Pulmonary  Acute hypercapnic respiratory failure  Patient with Hypercapnic Respiratory failure which is Acute.  he is not on home oxygen. Supplemental oxygen was provided and noted- Vent  Mode: Spont  Oxygen Concentration (%):  [30-36] 36  Resp Rate Total:  [13 br/min-24 br/min] 13 br/min  PEEP/CPAP:  [5 cmH20] 5 cmH20  Pressure Support:  [7 cmH20] 7 cmH20  Mean Airway Pressure:  [4.4 cmH20] 4.4 cmH20    .   Signs/symptoms of respiratory failure include- lethargy. Contributing diagnoses includes - cardiac arrest Labs and images were reviewed. Patient Has recent ABG, which has been reviewed. Will treat underlying causes and adjust management of respiratory failure as follows - ventilator support    intubated 10/28  Extubated to room air 10/30    Cardiac/Vascular  * Cardiac arrest  presumed VT/VF arrest given 3 shocks, 3 epis, and amio bolus in the field  CT head on admit without acute findings  S/p TTM and extubated 10/30  Developed CP overnight 10/30 with trop > 50  LHC 10/31 with multi-vessel disease, impella placed         Hyperlipidemia  Holding statin acutely  Can restart when LFTs normal     Primary hypertension  Vasopressors off since yesterday  Add back anti-hypertensives as needed      Coronary artery disease involving native coronary artery of native heart  h/o PCI in 2007  [facility] in 2022 with multi-vessel disease and pt declined CABG unless CP returned  troponin peaked at 6.192 prior to downtrending; CP returned 10/30 overnight with trop > 50  LHC 10/31 with multi-vessel disease  impella in place  CTS consulted with plans for CABG in the coming days     Renal/  KAREN (acute kidney injury)  Cr 1.9 at admission; b/l around 1.2  likely 2/2 to arrest and shock  improved with resuscitation   Good urine output, Cr normalized   monitor      Endocrine  Hypogonadism in male  Holding testosterone replacement    GI  Ulcerative proctitis  Doesn't appear he takes anything regularly at home (mention of PRN sulfasalazine in some clinic notes)  Supportive Care    Orthopedic  Non-traumatic rhabdomyolysis  CPK has peaked and down trended   S/p IVFs      DVT prophylaxis: heparin infusion  GI prophylaxis: not  indicated  Code status: Full   Disposition: cont ICU care     Sandra Dobson NP  Critical Care Medicine  [facility] - Intensive Care ([facility])

## 2023-10-31 NOTE — HPI
The patient is a 68-year-old male who was admitted to the hospital after out-of-hospital cardiac arrest.  The patient has hypertension, hypogonadism, Crohn's disease, coronary artery disease status post stent placement in 2007, cardiac catheterization in 2022 that shows left main disease, hyperlipidemia.  By report, the patient developed cardiac arrest while working out at [employer].  The patient was resuscitated with defibrillation, epinephrine and amiodarone prior to arriving in the hospital.  In the emergency room head CT was unremarkable.  Echocardiogram shows normal ejection fraction.  The patient was intubated and placed on a targeted temperature management.  Over the past 24 hours patient had recurrent episodes of chest pain with troponin elevation to 50.  Patient had cardiac catheterization done which showed severe multivessel coronary artery disease with distal left main stenosis and proximal RCA stenosis.  An Impella left ventricular assist device was then inserted in the cath lab.

## 2023-11-01 PROBLEM — J96.02 ACUTE HYPERCAPNIC RESPIRATORY FAILURE: Status: RESOLVED | Noted: 2023-10-28 | Resolved: 2023-11-01

## 2023-11-01 PROBLEM — M62.82 NON-TRAUMATIC RHABDOMYOLYSIS: Status: RESOLVED | Noted: 2023-10-29 | Resolved: 2023-11-01

## 2023-11-01 LAB
ABO + RH BLD: NORMAL
ALBUMIN SERPL BCP-MCNC: 2.5 G/DL (ref 3.5–5.2)
ALP SERPL-CCNC: 40 U/L (ref 55–135)
ALT SERPL W/O P-5'-P-CCNC: 116 U/L (ref 10–44)
ANION GAP SERPL CALC-SCNC: 10 MMOL/L (ref 8–16)
APTT PPP: 34.5 SEC (ref 21–32)
APTT PPP: 36.6 SEC (ref 21–32)
APTT PPP: 36.7 SEC (ref 21–32)
APTT PPP: 39.8 SEC (ref 21–32)
AST SERPL-CCNC: 160 U/L (ref 10–40)
BASOPHILS # BLD AUTO: 0.03 K/UL (ref 0–0.2)
BASOPHILS NFR BLD: 0.4 % (ref 0–1.9)
BILIRUB DIRECT SERPL-MCNC: 0.4 MG/DL (ref 0.1–0.3)
BILIRUB SERPL-MCNC: 0.7 MG/DL (ref 0.1–1)
BLD GP AB SCN CELLS X3 SERPL QL: NORMAL
BSA FOR ECHO PROCEDURE: 2.3 M2
BUN SERPL-MCNC: 12 MG/DL (ref 8–23)
CALCIUM SERPL-MCNC: 7.9 MG/DL (ref 8.7–10.5)
CHLORIDE SERPL-SCNC: 105 MMOL/L (ref 95–110)
CO2 SERPL-SCNC: 26 MMOL/L (ref 23–29)
CREAT SERPL-MCNC: 1 MG/DL (ref 0.5–1.4)
DIFFERENTIAL METHOD: ABNORMAL
EOSINOPHIL # BLD AUTO: 0 K/UL (ref 0–0.5)
EOSINOPHIL NFR BLD: 0.5 % (ref 0–8)
ERYTHROCYTE [DISTWIDTH] IN BLOOD BY AUTOMATED COUNT: 13.5 % (ref 11.5–14.5)
EST. GFR  (NO RACE VARIABLE): >60 ML/MIN/1.73 M^2
GLUCOSE SERPL-MCNC: 116 MG/DL (ref 70–110)
HCT VFR BLD AUTO: 37.6 % (ref 40–54)
HGB BLD-MCNC: 12.6 G/DL (ref 14–18)
IMM GRANULOCYTES # BLD AUTO: 0.04 K/UL (ref 0–0.04)
IMM GRANULOCYTES NFR BLD AUTO: 0.5 % (ref 0–0.5)
LYMPHOCYTES # BLD AUTO: 0.7 K/UL (ref 1–4.8)
LYMPHOCYTES NFR BLD: 7.6 % (ref 18–48)
MAGNESIUM SERPL-MCNC: 2 MG/DL (ref 1.6–2.6)
MCH RBC QN AUTO: 29.3 PG (ref 27–31)
MCHC RBC AUTO-ENTMCNC: 33.5 G/DL (ref 32–36)
MCV RBC AUTO: 87 FL (ref 82–98)
MONOCYTES # BLD AUTO: 0.8 K/UL (ref 0.3–1)
MONOCYTES NFR BLD: 9.1 % (ref 4–15)
NEUTROPHILS # BLD AUTO: 7 K/UL (ref 1.8–7.7)
NEUTROPHILS NFR BLD: 81.9 % (ref 38–73)
NRBC BLD-RTO: 0 /100 WBC
PHOSPHATE SERPL-MCNC: 1.5 MG/DL (ref 2.7–4.5)
PLATELET # BLD AUTO: 145 K/UL (ref 150–450)
PMV BLD AUTO: 10.6 FL (ref 9.2–12.9)
POCT GLUCOSE: 102 MG/DL (ref 70–110)
POCT GLUCOSE: 106 MG/DL (ref 70–110)
POCT GLUCOSE: 138 MG/DL (ref 70–110)
POCT GLUCOSE: 143 MG/DL (ref 70–110)
POTASSIUM SERPL-SCNC: 3.7 MMOL/L (ref 3.5–5.1)
PROT SERPL-MCNC: 5.2 G/DL (ref 6–8.4)
RA PRESSURE ESTIMATED: 3 MMHG
RBC # BLD AUTO: 4.3 M/UL (ref 4.6–6.2)
RV MID DIAMA: 3.43 CM
SODIUM SERPL-SCNC: 141 MMOL/L (ref 136–145)
SPECIMEN OUTDATE: NORMAL
TROPONIN I SERPL DL<=0.01 NG/ML-MCNC: 33.55 NG/ML (ref 0–0.03)
WBC # BLD AUTO: 8.57 K/UL (ref 3.9–12.7)

## 2023-11-01 PROCEDURE — 94761 N-INVAS EAR/PLS OXIMETRY MLT: CPT

## 2023-11-01 PROCEDURE — 83735 ASSAY OF MAGNESIUM: CPT | Performed by: INTERNAL MEDICINE

## 2023-11-01 PROCEDURE — 63600175 PHARM REV CODE 636 W HCPCS: Performed by: INTERNAL MEDICINE

## 2023-11-01 PROCEDURE — 36415 COLL VENOUS BLD VENIPUNCTURE: CPT | Performed by: INTERNAL MEDICINE

## 2023-11-01 PROCEDURE — 99232 SBSQ HOSP IP/OBS MODERATE 35: CPT | Mod: ,,, | Performed by: INTERNAL MEDICINE

## 2023-11-01 PROCEDURE — 86901 BLOOD TYPING SEROLOGIC RH(D): CPT | Performed by: THORACIC SURGERY (CARDIOTHORACIC VASCULAR SURGERY)

## 2023-11-01 PROCEDURE — 84100 ASSAY OF PHOSPHORUS: CPT | Performed by: INTERNAL MEDICINE

## 2023-11-01 PROCEDURE — 80076 HEPATIC FUNCTION PANEL: CPT | Performed by: INTERNAL MEDICINE

## 2023-11-01 PROCEDURE — 84134 ASSAY OF PREALBUMIN: CPT | Performed by: THORACIC SURGERY (CARDIOTHORACIC VASCULAR SURGERY)

## 2023-11-01 PROCEDURE — 25000003 PHARM REV CODE 250: Performed by: INTERNAL MEDICINE

## 2023-11-01 PROCEDURE — 80048 BASIC METABOLIC PNL TOTAL CA: CPT | Performed by: INTERNAL MEDICINE

## 2023-11-01 PROCEDURE — 63600175 PHARM REV CODE 636 W HCPCS: Performed by: SPECIALIST

## 2023-11-01 PROCEDURE — 85730 THROMBOPLASTIN TIME PARTIAL: CPT | Mod: 91 | Performed by: SPECIALIST

## 2023-11-01 PROCEDURE — 20000000 HC ICU ROOM

## 2023-11-01 PROCEDURE — 84484 ASSAY OF TROPONIN QUANT: CPT | Performed by: INTERNAL MEDICINE

## 2023-11-01 PROCEDURE — 94799 UNLISTED PULMONARY SVC/PX: CPT

## 2023-11-01 PROCEDURE — 25000003 PHARM REV CODE 250: Performed by: THORACIC SURGERY (CARDIOTHORACIC VASCULAR SURGERY)

## 2023-11-01 PROCEDURE — 25000003 PHARM REV CODE 250: Performed by: NURSE PRACTITIONER

## 2023-11-01 PROCEDURE — 99232 PR SUBSEQUENT HOSPITAL CARE,LEVL II: ICD-10-PCS | Mod: ,,, | Performed by: INTERNAL MEDICINE

## 2023-11-01 PROCEDURE — 85730 THROMBOPLASTIN TIME PARTIAL: CPT | Mod: 91 | Performed by: INTERNAL MEDICINE

## 2023-11-01 PROCEDURE — 85730 THROMBOPLASTIN TIME PARTIAL: CPT | Performed by: INTERNAL MEDICINE

## 2023-11-01 PROCEDURE — 83036 HEMOGLOBIN GLYCOSYLATED A1C: CPT | Performed by: THORACIC SURGERY (CARDIOTHORACIC VASCULAR SURGERY)

## 2023-11-01 PROCEDURE — 99900035 HC TECH TIME PER 15 MIN (STAT)

## 2023-11-01 PROCEDURE — 85025 COMPLETE CBC W/AUTO DIFF WBC: CPT | Performed by: INTERNAL MEDICINE

## 2023-11-01 PROCEDURE — 86920 COMPATIBILITY TEST SPIN: CPT | Performed by: THORACIC SURGERY (CARDIOTHORACIC VASCULAR SURGERY)

## 2023-11-01 RX ORDER — AMIODARONE HYDROCHLORIDE 200 MG/1
200 TABLET ORAL 2 TIMES DAILY
Status: DISCONTINUED | OUTPATIENT
Start: 2023-11-01 | End: 2023-11-07 | Stop reason: HOSPADM

## 2023-11-01 RX ORDER — CHLORHEXIDINE GLUCONATE ORAL RINSE 1.2 MG/ML
10 SOLUTION DENTAL
Status: DISCONTINUED | OUTPATIENT
Start: 2023-11-01 | End: 2023-11-03

## 2023-11-01 RX ORDER — HYDRALAZINE HYDROCHLORIDE 20 MG/ML
20 INJECTION INTRAMUSCULAR; INTRAVENOUS EVERY 6 HOURS PRN
Status: DISCONTINUED | OUTPATIENT
Start: 2023-11-01 | End: 2023-11-07 | Stop reason: HOSPADM

## 2023-11-01 RX ORDER — AMOXICILLIN 250 MG
1 CAPSULE ORAL 2 TIMES DAILY
Status: DISCONTINUED | OUTPATIENT
Start: 2023-11-01 | End: 2023-11-02

## 2023-11-01 RX ADMIN — POTASSIUM CHLORIDE 40 MEQ: 29.8 INJECTION, SOLUTION INTRAVENOUS at 06:11

## 2023-11-01 RX ADMIN — MUPIROCIN: 20 OINTMENT TOPICAL at 09:11

## 2023-11-01 RX ADMIN — FAMOTIDINE 20 MG: 20 TABLET ORAL at 08:11

## 2023-11-01 RX ADMIN — SODIUM BICARBONATE: 84 INJECTION, SOLUTION INTRAVENOUS at 04:11

## 2023-11-01 RX ADMIN — METOPROLOL SUCCINATE 25 MG: 25 TABLET, EXTENDED RELEASE ORAL at 08:11

## 2023-11-01 RX ADMIN — HEPARIN SODIUM 12 UNITS/KG/HR: 10000 INJECTION, SOLUTION INTRAVENOUS at 12:11

## 2023-11-01 RX ADMIN — HYDRALAZINE HYDROCHLORIDE 20 MG: 20 INJECTION, SOLUTION INTRAMUSCULAR; INTRAVENOUS at 10:11

## 2023-11-01 RX ADMIN — AMIODARONE HYDROCHLORIDE 0.5 MG/MIN: 1.8 INJECTION, SOLUTION INTRAVENOUS at 02:11

## 2023-11-01 RX ADMIN — NITROGLYCERIN 30 MCG/MIN: 20 INJECTION INTRAVENOUS at 04:11

## 2023-11-01 RX ADMIN — LIDOCAINE 1 PATCH: 50 PATCH CUTANEOUS at 04:11

## 2023-11-01 RX ADMIN — SENNOSIDES AND DOCUSATE SODIUM 1 TABLET: 50; 8.6 TABLET ORAL at 08:11

## 2023-11-01 RX ADMIN — ATORVASTATIN CALCIUM 80 MG: 40 TABLET, FILM COATED ORAL at 08:11

## 2023-11-01 RX ADMIN — SENNOSIDES AND DOCUSATE SODIUM 1 TABLET: 50; 8.6 TABLET ORAL at 10:11

## 2023-11-01 RX ADMIN — ASPIRIN 81 MG CHEWABLE TABLET 81 MG: 81 TABLET CHEWABLE at 08:11

## 2023-11-01 RX ADMIN — AMIODARONE HYDROCHLORIDE 200 MG: 200 TABLET ORAL at 04:11

## 2023-11-01 RX ADMIN — MUPIROCIN: 20 OINTMENT TOPICAL at 08:11

## 2023-11-01 RX ADMIN — AMIODARONE HYDROCHLORIDE 0.5 MG/MIN: 1.8 INJECTION, SOLUTION INTRAVENOUS at 12:11

## 2023-11-01 RX ADMIN — POTASSIUM PHOSPHATE, MONOBASIC POTASSIUM PHOSPHATE, DIBASIC 30 MMOL: 224; 236 INJECTION, SOLUTION, CONCENTRATE INTRAVENOUS at 10:11

## 2023-11-01 NOTE — PLAN OF CARE
Nutrition recommendations 11/1:  1. Recommend pt continues on Cardiac diet   2. Recommend vanilla Boost glucose control TID to assist filling nutritional gaps  3. Recommend Fred BID for wound healing   4. Recommend continue bowel regimen (No BM x 4 days)  5. Recommend feeding assistance, PO and supplement intake encouragement   6. Daily weights   7. Collaboration with other providers     Goals:   1. Pt will be initiated onto EN within 24 hrs. (Resolved)   2. Pt will consume and tolerate >50% of EEN/EPN prior to RD follow up  3. Pt will consume Fred BID prior to RD follow up   4. Pt will have BM prior to RD follow up     Ernestine Mercado, Registration Eligible, Provisional LDN

## 2023-11-01 NOTE — ANESTHESIA PREPROCEDURE EVALUATION
11/01/2023  Carlin Isaac is a 68 y.o., male.    Patient Active Problem List   Diagnosis    Benign prostatic hyperplasia with nocturia    Family history of prostate cancer in father    Cardiac arrest    Coronary artery disease involving native coronary artery of native heart    KAREN (acute kidney injury)    Primary hypertension    Hyperlipidemia    Hypogonadism in male    Ulcerative proctitis     Past Medical History:   Diagnosis Date    Kidney stone      Past Surgical History:   Procedure Laterality Date    INSERTION OF INTRAVASCULAR MICROAXIAL BLOOD PUMP N/A 10/31/2023    Procedure: INSERTION, IMPELLA;  Surgeon: Baldev Mathew MD;  Location: [facility] CATH LAB;  Service: Cardiology;  Laterality: N/A;    LEFT HEART CATHETERIZATION Left 10/31/2023    Procedure: Left heart cath, with possible Impella;  Surgeon: Baldev Mathew MD;  Location: [facility] CATH LAB;  Service: Cardiology;  Laterality: Left;       Pre-op Assessment    I have reviewed the Patient Summary Reports.     I have reviewed the Nursing Notes. I have reviewed the NPO Status.   I have reviewed the Medications.     Review of Systems  Anesthesia Hx:  History of prior surgery of interest to airway management or planning: Previous anesthesia: MAC Denies Family Hx of Anesthesia complications.   Denies Personal Hx of Anesthesia complications.   Social:  Former Smoker    Hematology/Oncology:     Oncology Normal    -- Anemia (10-28):   EENT/Dental:EENT/Dental Normal   Cardiovascular:   Hypertension Past MI CAD  CABG/stent (2007 PCI with Stent)  ECG has been reviewed. 1028 Acute NSTMI with cardiac arrest  LHC - LM and 3v CAD  Impella and Femoral a line in place  Troponin trending down   Pulmonary:  Pulmonary Normal    Renal/:   Chronic Renal Disease BPH Creatinine trending down   Hepatic/GI:   PUD,    Musculoskeletal:   Acute rhabdomyolysis    Neurological:  Neurology Normal    Endocrine:  Endocrine Normal BMI 27 Denies Obesity / BMI > 30  Dermatological:  Skin Normal    Psych:  Psychiatric Normal           Chemistry        Component Value Date/Time     11/02/2023 0412    K 4.0 11/02/2023 0412     11/02/2023 0412    CO2 25 11/02/2023 0412    BUN 13 11/02/2023 0412    CREATININE 0.9 11/02/2023 0412     (H) 11/02/2023 0412        Component Value Date/Time    CALCIUM 8.6 (L) 11/02/2023 0412    ALKPHOS 44 (L) 11/02/2023 0412    AST 94 (H) 11/02/2023 0412     (H) 11/02/2023 0412    BILITOT 0.8 11/02/2023 0412        Lab Results   Component Value Date    WBC 9.02 11/02/2023    HGB 13.5 (L) 11/02/2023    HCT 40.2 11/02/2023    MCV 88 11/02/2023     (L) 11/02/2023       Physical Exam  General: Alert and Oriented  Dark tan due to peptide injections  Good radial pulses but right hand is a bit swollen   Airway:  Mallampati: II   Mouth Opening: Normal  TM Distance: Normal  Tongue: Normal  Neck ROM: Normal ROM    Dental:  Intact    ECHO    Left Ventricle: The left ventricle is normal in size. Normal wall thickness. Normal wall motion. There is normal systolic function with a visually estimated ejection fraction of 60 - 65%. There is normal diastolic function.    Right Ventricle: Normal right ventricular cavity size. Wall thickness is normal. Right ventricle wall motion  is normal. Systolic function is normal.    IVC/SVC: Normal venous pressure at 3 mmHg.       Anesthesia Plan  Type of Anesthesia, risks & benefits discussed:    Anesthesia Type: Gen ETT  Intra-op Monitoring Plan: Standard ASA Monitors, Art Line, Central Line, REANNA and CO  Post Op Pain Control Plan: multimodal analgesia and IV/PO Opioids PRN  Induction:  IV  Airway Plan: Direct, Post-Induction  Informed Consent: Informed consent signed with the Patient and all parties understand the risks and agree with anesthesia plan.  All questions answered. Patient consented to  blood products? Yes  ASA Score: 4  Day of Surgery Review of History & Physical: I have interviewed and examined the patient. I have reviewed the patient's H&P dated:     Ready For Surgery From Anesthesia Perspective.     .

## 2023-11-01 NOTE — PROGRESS NOTES
[facility] - Intensive Care ([facility])  Cardiology  Progress Note    Patient Name: Carlin Isaac  MRN: [id number]  Admission Date: 10/28/2023  Hospital Length of Stay: 4 days  Code Status: Full Code   Attending Physician: Marc Tate MD   Primary Care Physician: Eliot Ambriz MD  Expected Discharge Date:   Principal Problem:Cardiac arrest    Subjective:     Hospital Course:   10/29/2023: The patient is supported intubated stable.  Cardiac echo shows normal LV function troponin levels are elevated EKG shows ST segments have reverted back to normal sinus rhythm with normal intervals and no evidence of further ST changes.  Putting the wife the patient has significant coronary disease and had refused bypass surgery last year.  Will plan another heart catheterization after he is wakeful and extubated.  Clinically stable this time with supportive care.    10/30/23   Intubated, NSR, some nonsustained tachyrhythmia overnight.Pt on Levo, Amio,.     10/31/23 CP last night requiring tridil, troponin > 50, no CP on exam. Discussed cath with pt and agrees. Will schedule with Dr. Mathew    11/1/23   No CP, CABG tomorrow, impella in place, no CP      Interval History:     Review of Systems   Constitutional: Negative. Negative for weight gain.   HENT: Negative.     Eyes: Negative.    Cardiovascular: Negative.  Negative for chest pain, leg swelling and palpitations.   Respiratory: Negative.  Negative for shortness of breath.    Endocrine: Negative.    Hematologic/Lymphatic: Negative.    Skin: Negative.    Musculoskeletal:  Negative for muscle weakness.   Gastrointestinal: Negative.    Genitourinary: Negative.    Neurological: Negative.  Negative for dizziness.   Psychiatric/Behavioral: Negative.     Allergic/Immunologic: Negative.    All other systems reviewed and are negative.    Objective:     Vital Signs (Most Recent):  Temp: 99.9 °F (37.7 °C) (11/01/23 0925)  Pulse: 80 (11/01/23 0910)  Resp: (Abnormal) 24 (11/01/23  0910)  BP: (Abnormal) 140/84 (11/01/23 0900)  SpO2: 98 % (11/01/23 0910) Vital Signs (24h Range):  Temp:  [97.8 °F (36.6 °C)-99.9 °F (37.7 °C)] 99.9 °F (37.7 °C)  Pulse:  [72-91] 80  Resp:  [0-83] 24  SpO2:  [89 %-99 %] 98 %  BP: (102-161)/(60-86) 140/84  Arterial Line BP: (107-157)/(59-79) 157/70     Weight: 99.8 kg (220 lb 0.3 oz)  Body mass index is 27.5 kg/m².     SpO2: 98 %         Intake/Output Summary (Last 24 hours) at 11/1/2023 0952  Last data filed at 11/1/2023 0900  Gross per 24 hour   Intake 2867.05 ml   Output 3135 ml   Net -267.95 ml       Lines/Drains/Airways       Central Venous Catheter Line       Name Duration    Percutaneous Central Line Insertion/Assessment - Triple Lumen  10/28/23 0950 Internal Jugular Right 4 days              Drain       Name Duration         Urethral Catheter 10/28/23 1318 Temperature probe 3 days              Arterial Line       Name Duration    Arterial Line 10/28/23 1830 Left Femoral 3 days              Line       Name Duration         VAD 10/31/23 1025 Left ventricular assist device Impella <1 day              Peripheral Intravenous Line       Name Duration         Peripheral IV - Single Lumen 10/28/23 0915 20 G Left;Posterior Forearm 4 days                       Physical Exam  Vitals and nursing note reviewed.   Constitutional:       Appearance: He is well-developed.   HENT:      Head: Normocephalic and atraumatic.   Eyes:      Conjunctiva/sclera: Conjunctivae normal.      Pupils: Pupils are equal, round, and reactive to light.   Cardiovascular:      Rate and Rhythm: Normal rate and regular rhythm.      Pulses: Intact distal pulses.      Heart sounds: Normal heart sounds.   Pulmonary:      Effort: Pulmonary effort is normal.      Breath sounds: Normal breath sounds.   Abdominal:      General: Bowel sounds are normal.      Palpations: Abdomen is soft.   Musculoskeletal:      Cervical back: Normal range of motion and neck supple.   Skin:     General: Skin is warm and dry.    Neurological:      Mental Status: He is alert and oriented to person, place, and time.            Significant Labs: All pertinent lab results from the last 24 hours have been reviewed.    Significant Imaging: X-Ray: CXR: X-Ray Chest 1 View (CXR): No results found for this visit on 10/28/23.    Assessment and Plan:     Brief HPI:     * Cardiac arrest  Will continue supportive care  Continue amiodarone  Continue enoxaparin  Add BB if needed    Coronary artery disease involving native coronary artery of native heart  Significant CAD, EKG shows anterior and lateral ischemia  Plan nitrates if possible  1 st troponin negative and will trend  Likely cardiac event was VT  Cardiac echo shows  Preserved LV function        VTE Risk Mitigation (From admission, onward)         Ordered     heparin 25,000 units in dextrose 5% (100 units/ml) IV bolus from bag - ADDITIONAL PRN BOLUS - 30 units/kg (max bolus 4000 units)  As needed (PRN)        Question:  Heparin Infusion Adjustment (DO NOT MODIFY ANSWER)  Answer:  \\[email or web address]\epic\Images\Pharmacy\HeparinInfusions\heparin LOW INTENSITY nomogram for [facility] [id number].pdf    10/31/23 0001     heparin 25,000 units in dextrose 5% (100 units/ml) IV bolus from bag - ADDITIONAL PRN BOLUS - 60 units/kg (max bolus 4000 units)  As needed (PRN)        Question:  Heparin Infusion Adjustment (DO NOT MODIFY ANSWER)  Answer:  \\[employer]\epic\Images\Pharmacy\HeparinInfusions\heparin LOW INTENSITY nomogram for [facility] [id number].pdf    10/30/23 2212     heparin 25,000 units in dextrose 5% (100 units/ml) IV bolus from bag INITIAL BOLUS (max bolus 4000 units)  Once        Question:  Heparin Infusion Adjustment (DO NOT MODIFY ANSWER)  Answer:  \[employer]\epic\Images\Pharmacy\HeparinInfusions\heparin LOW INTENSITY nomogram for [facility] [id number]    10/30/23 2212     heparin 25,000 units in dextrose 5% 250 mL (100 units/mL) infusion LOW INTENSITY nomogram - [facility]  Continuous        Question:  Begin at (units/kg/hr)   Answer:  12    10/30/23 2212     IP VTE HIGH RISK PATIENT  Once         10/28/23 1229     Place sequential compression device  Until discontinued         10/28/23 1229                Jared Nieto MD  Cardiology  [facility] - Intensive [facility] ([facility])

## 2023-11-01 NOTE — PLAN OF CARE
POC di scussed w/pt, VU. Impella remains in place, set to P-5, no issues overnight. NSR on monitor. VSS. Pt denies any CP, Tridil gtt weaned off. Amio gtt continued, hep gtt titrated per nomogram, next aPTT scheduled for 0700. Post-cath fluids d/c'd per orders. CBGs WNL. Total 2250mL UOP per ayala this shift. R leg immobilizer in place; pt repositioned Q2, heels floated. AM K 3.7; 40mEq KCl IVPB given. Fall precautions in place, bed alarm on. Pt slept b/t care overnight.

## 2023-11-01 NOTE — PLAN OF CARE
Plan of care reviewed with pt and pt's significant other at bedside.  Cont heparin titrated per nomogram and tridil gtt titrated for chest pain.  Impella R groin without acute complications, compliant with RLE restrictions.  Art line to L groin, VSS on room air.  Amio gtt transitioned to PO this shift.  Electrolytes replaced as appropriate.   CABG planned for am.

## 2023-11-01 NOTE — SUBJECTIVE & OBJECTIVE
Objective:     Vital Signs (Most Recent):  Temp: 99.7 °F (37.6 °C) (11/01/23 1126)  Pulse: 87 (11/01/23 1126)  Resp: (!) 23 (11/01/23 1126)  BP: (!) 143/77 (11/01/23 1100)  SpO2: 96 % (11/01/23 1126) Vital Signs (24h Range):  Temp:  [97.8 °F (36.6 °C)-100 °F (37.8 °C)] 99.7 °F (37.6 °C)  Pulse:  [72-91] 87  Resp:  [0-83] 23  SpO2:  [91 %-99 %] 96 %  BP: (111-161)/(61-88) 143/77  Arterial Line BP: (107-165)/(59-86) 130/65     Weight: 99.8 kg (220 lb)  Body mass index is 27.5 kg/m².      Intake/Output Summary (Last 24 hours) at 11/1/2023 1342  Last data filed at 11/1/2023 1211  Gross per 24 hour   Intake 2662.56 ml   Output 3405 ml   Net -742.44 ml        Physical Exam  Vitals reviewed.   Constitutional:       Appearance: He is well-developed.   HENT:      Head: Normocephalic and atraumatic.      Mouth/Throat:      Mouth: Mucous membranes are moist.      Pharynx: Oropharynx is clear.   Eyes:      Extraocular Movements: Extraocular movements intact.      Conjunctiva/sclera: Conjunctivae normal.   Cardiovascular:      Rate and Rhythm: Normal rate and regular rhythm.      Pulses: Normal pulses.      Comments: Right femoral impella   Pulmonary:      Effort: Pulmonary effort is normal.      Breath sounds: No wheezing or rhonchi.   Abdominal:      General: Bowel sounds are normal.      Palpations: Abdomen is soft.   Musculoskeletal:         General: No deformity.      Cervical back: Normal range of motion and neck supple.      Right lower leg: No edema.   Skin:     General: Skin is warm and dry.   Neurological:      General: No focal deficit present.      Mental Status: He is alert and oriented to person, place, and time.   Psychiatric:         Mood and Affect: Mood normal.         Behavior: Behavior is cooperative.         Thought Content: Thought content normal.           Review of Systems  Completed and negative except per HPI      Lines/Drains/Airways       Central Venous Catheter Line  Duration              Percutaneous Central Line Insertion/Assessment - Triple Lumen  10/28/23 0950 Internal Jugular Right 4 days              Drain  Duration                  Urethral Catheter 10/28/23 1318 Temperature probe 4 days              Arterial Line  Duration             Arterial Line 10/28/23 1830 Left Femoral 3 days              Line  Duration                  VAD 10/31/23 1025 Left ventricular assist device Impella 1 day              Peripheral Intravenous Line  Duration                  Peripheral IV - Single Lumen 10/28/23 0915 20 G Left;Posterior Forearm 4 days                    Significant Labs:    CBC/Anemia Profile:  Recent Labs   Lab 10/30/23  2218 10/31/23  0540 11/01/23  0456   WBC 10.30 9.93 8.57   HGB 14.7 13.6* 12.6*   HCT 43.9 40.4 37.6*   * 159 145*   MCV 89 89 87   RDW 13.6 13.5 13.5        Chemistries:  Recent Labs   Lab 10/30/23  1552 10/31/23  0540 10/31/23  0813 11/01/23  0456   NA  --   --  140 141   K  --   --  3.9 3.7   CL  --   --  102 105   CO2  --   --  28 26   BUN  --   --  20 12   CREATININE  --   --  1.1 1.0   CALCIUM  --   --  8.0* 7.9*   ALBUMIN  --   --  2.6* 2.5*   PROT  --   --  5.3* 5.2*   BILITOT  --   --  0.7 0.7   ALKPHOS  --   --  39* 40*   ALT  --   --  142* 116*   AST  --   --  249* 160*   MG 1.9 1.8  --  2.0   PHOS 3.5 1.7*  --  1.5*       All pertinent labs within the past 24 hours have been reviewed.    Significant Imaging:  I have reviewed all pertinent imaging results/findings within the past 24 hours.

## 2023-11-01 NOTE — PROGRESS NOTES
[facility] ([facility])  Critical Care Medicine  Progress Note    Patient Name: Carlin Isaac  MRN: [id number]  Admission Date: 10/28/2023  Hospital Length of Stay: 4 days  Code Status: Full Code  Attending Provider: Marc Tate MD  Primary Care Provider: Eliot Ambriz MD   Principal Problem: Cardiac arrest    Subjective:     HPI:  Mr Isaac is a 67 y/o man with HTN, hypogonadism on chonic testosterone, Chrohn's dz/Ulcerative proctitis, CAD s/p stent to the proximal/mid LAD in 2007, and HLD who presents to the ER today after an out of hospital cardiac arrest.  History taken from the medical record and discussion with staff as patient is unable to provide history and no family at bedside during my exam.  Reportedly, he was working out at [employer] when he suddenly collapsed.  EMS administered 3 shocks, 3 epis, and amio bolus en route.  Presumed rythm was VF.    Of note, he had angina in Sept 2022 and [facility] with multi-vessel disease.  He was recommended for CABG, but he declined at the time as his angina had resolved.    In the ER, lab work notable for INR 1.3, Cr 1.9, mildly elevated LFTs,  with negative initial troponin.  Echo fairly unremarkable.  He is intubated and sedated.  TTM in place and will change goal to 36.  Currently on Levo for vasopressor support through CVL placed by ER.      Hospital/ICU Course:  10/30: HR and BP labile overnight with tachy-manuel rhythm requiring adjustment of drips. Levophed, Amiodarone, fentanyl and propofol infusing. Completed TTM yesterday afternoon  11/1: no acute events overnight. Off levophed and Nitro. Remains on amiodarone infusion. [facility] yesterday with impella placed. Plans for CABG with Dr. Cazares tomorrow.        Objective:     Vital Signs (Most Recent):  Temp: 99.7 °F (37.6 °C) (11/01/23 1126)  Pulse: 87 (11/01/23 1126)  Resp: (!) 23 (11/01/23 1126)  BP: (!) 143/77 (11/01/23 1100)  SpO2: 96 % (11/01/23 1126) Vital Signs (24h  Range):  Temp:  [97.8 °F (36.6 °C)-100 °F (37.8 °C)] 99.7 °F (37.6 °C)  Pulse:  [72-91] 87  Resp:  [0-83] 23  SpO2:  [91 %-99 %] 96 %  BP: (111-161)/(61-88) 143/77  Arterial Line BP: (107-165)/(59-86) 130/65     Weight: 99.8 kg (220 lb)  Body mass index is 27.5 kg/m².      Intake/Output Summary (Last 24 hours) at 11/1/2023 1342  Last data filed at 11/1/2023 1211  Gross per 24 hour   Intake 2662.56 ml   Output 3405 ml   Net -742.44 ml        Physical Exam  Vitals reviewed.   Constitutional:       Appearance: He is well-developed.   HENT:      Head: Normocephalic and atraumatic.      Mouth/Throat:      Mouth: Mucous membranes are moist.      Pharynx: Oropharynx is clear.   Eyes:      Extraocular Movements: Extraocular movements intact.      Conjunctiva/sclera: Conjunctivae normal.   Cardiovascular:      Rate and Rhythm: Normal rate and regular rhythm.      Pulses: Normal pulses.      Comments: Right femoral impella   Pulmonary:      Effort: Pulmonary effort is normal.      Breath sounds: No wheezing or rhonchi.   Abdominal:      General: Bowel sounds are normal.      Palpations: Abdomen is soft.   Musculoskeletal:         General: No deformity.      Cervical back: Normal range of motion and neck supple.      Right lower leg: No edema.   Skin:     General: Skin is warm and dry.   Neurological:      General: No focal deficit present.      Mental Status: He is alert and oriented to person, place, and time.   Psychiatric:         Mood and Affect: Mood normal.         Behavior: Behavior is cooperative.         Thought Content: Thought content normal.           Review of Systems  Completed and negative except per HPI      Lines/Drains/Airways       Central Venous Catheter Line  Duration             Percutaneous Central Line Insertion/Assessment - Triple Lumen  10/28/23 0950 Internal Jugular Right 4 days              Drain  Duration                  Urethral Catheter 10/28/23 1318 Temperature probe 4 days               Arterial Line  Duration             Arterial Line 10/28/23 1830 Left Femoral 3 days              Line  Duration                  VAD 10/31/23 1025 Left ventricular assist device Impella 1 day              Peripheral Intravenous Line  Duration                  Peripheral IV - Single Lumen 10/28/23 0915 20 G Left;Posterior Forearm 4 days                    Significant Labs:    CBC/Anemia Profile:  Recent Labs   Lab 10/30/23  2218 10/31/23  0540 11/01/23  0456   WBC 10.30 9.93 8.57   HGB 14.7 13.6* 12.6*   HCT 43.9 40.4 37.6*   * 159 145*   MCV 89 89 87   RDW 13.6 13.5 13.5        Chemistries:  Recent Labs   Lab 10/30/23  1552 10/31/23  0540 10/31/23  0813 11/01/23  0456   NA  --   --  140 141   K  --   --  3.9 3.7   CL  --   --  102 105   CO2  --   --  28 26   BUN  --   --  20 12   CREATININE  --   --  1.1 1.0   CALCIUM  --   --  8.0* 7.9*   ALBUMIN  --   --  2.6* 2.5*   PROT  --   --  5.3* 5.2*   BILITOT  --   --  0.7 0.7   ALKPHOS  --   --  39* 40*   ALT  --   --  142* 116*   AST  --   --  249* 160*   MG 1.9 1.8  --  2.0   PHOS 3.5 1.7*  --  1.5*       All pertinent labs within the past 24 hours have been reviewed.    Significant Imaging:  I have reviewed all pertinent imaging results/findings within the past 24 hours.      ABG  Recent Labs   Lab 10/30/23  0359   PH 7.281*   PO2 89   PCO2 48.4*   HCO3 22.8*   BE -4*     Assessment/Plan:     Cardiac/Vascular  * Cardiac arrest  presumed VT/VF arrest given 3 shocks, 3 epis, and amio bolus in the field  CT head on admit without acute findings  S/p TTM and extubated 10/30  Developed CP overnight 10/30 with trop > 50  LHC 10/31 with multi-vessel disease, impella placed   Plans for CABG tomorrow        Hyperlipidemia  Holding statin acutely  Can restart when LFTs normal     Primary hypertension  Vasopressors now off  Add back anti-hypertensives as needed      Coronary artery disease involving native coronary artery of native heart  h/o PCI in 2007  LHC in 2022 with  multi-vessel disease and pt declined CABG unless CP returned  troponin peaked at 6.192 prior to downtrending; CP returned 10/30 overnight with trop > 50  LHC 10/31 with multi-vessel disease  impella in place  CTS consulted with plans for CABG in am     Renal/  KAREN (acute kidney injury)  Cr 1.9 at admission; b/l around 1.2  likely 2/2 to arrest and shock  improved with resuscitation   Good urine output, Cr normalized   monitor      Endocrine  Hypogonadism in male  Holding testosterone replacement    GI  Ulcerative proctitis  Doesn't appear he takes anything regularly at home (mention of PRN sulfasalazine in some clinic notes)  Supportive Care      DVT prophylaxis: heparin  GI prophylaxis: pepcid  Code status: Full   Disposition: Cont ICU care    Sandra Dobson NP  Critical Care Medicine  [facility] ([facility])

## 2023-11-01 NOTE — ASSESSMENT & PLAN NOTE
Cr 1.9 at admission; b/l around 1.2  likely 2/2 to arrest and shock  improved with resuscitation   Good urine output, Cr normalized   monitor

## 2023-11-01 NOTE — ASSESSMENT & PLAN NOTE
h/o PCI in 2007  LHC in 2022 with multi-vessel disease and pt declined CABG unless CP returned  troponin peaked at 6.192 prior to downtrending; CP returned 10/30 overnight with trop > 50  LHC 10/31 with multi-vessel disease  impella in place  CTS consulted with plans for CABG in am

## 2023-11-01 NOTE — ASSESSMENT & PLAN NOTE
presumed VT/VF arrest given 3 shocks, 3 epis, and amio bolus in the field  CT head on admit without acute findings  S/p TTM and extubated 10/30  Developed CP overnight 10/30 with trop > 50  LHC 10/31 with multi-vessel disease, impella placed   Plans for CABG tomorrow       No

## 2023-11-01 NOTE — PROGRESS NOTES
[facility] (Hospital)  Adult Nutrition  Progress Note    SUMMARY       Recommendations    Recommendation/Intervention:   1. Recommend pt continues on Cardiac diet   2. Recommend vanilla Boost glucose control TID to assist filling nutritional gaps  3. Recommend Fred BID for wound healing   4. Recommend continue bowel regimen (No BM x 4 days)  5. Recommend feeding assistance, PO and supplement intake encouragement   6. Daily weights     Goals:   1. Pt will be initiated onto EN within 24 hrs. (Resolved)   2. Pt will consume and tolerate >50% of EEN/EPN prior to RD follow up  3. Pt will consume Fred BID prior to RD follow up   4. Pt will have BM prior to RD follow up   Nutrition Goal Status: new  Communication of RD Recs: other (comment) (POC: Sticky Note)    Assessment and Plan    Nutrition Problem  Inadequate protein/energy intake  Increased protein needs      Related to (etiology):   Self limitation of foods due to preference   Increase demand for nutrition      Signs and Symptoms (as evidenced by):   Estimated intake of food less than estimated needs: 25% PO intake of meals   Surgery wounds (CABG)     Interventions(treatment strategy):  1. Sodium and Fat modified diet  2. Commercial beverage   3. Mineral and medical food supplement therapy for wound healing   4. Feeding assistance, PO and supplement encouragement   5. Collaboration with other providers     Nutrition Diagnosis Status:   New     Malnutrition Assessment     Skin (Micronutrient): cyanosis, dry (Lex score = 16 (mild risk)                                 Reason for Assessment    Reason For Assessment: consult, new tube feeding  Diagnosis: cardiac disease (Cardiac arrest)  Relevant Medical History: Cardiac arrest, CAD, KAREN, HTN, HLD, Acute hypercapnic respiratory failure  Interdisciplinary Rounds: did not attend  General Information Comments: 69 y/o male admitted d/t active principal problem of Cardiac arrest. Pt currently in the ICU  "intubated (Total Ve: 10 L/M) and sedated (Propofol: 20.2 mL/hr). NFPE not currently appropriate. Pt currently charted to weigh 247 lb, BMI 30.87 (Obese). Pt LBM: 10/28. RD will continue to follow    Nutrition Discharge Planning: Cardiac diet + Fred BID + Boost plus as warranted     Follow up:  11/1/23: RD follow up. Per Pulmonology NP note 11/1 "S/p TTM and extubated 10/30; Developed CP overnight 10/30 with trop > 50; LHC 10/31 with multi-vessel disease, impella placed; Plans for CABG tomorrow". Visited pt at bedside, pt wife present. Pt confirmed 25% PO intake of breakfast, pt wife stated he is a picky eater and that she assisted pt with breakfast, pt confirmed he had a good appetite and PO intake PTA, provided pt with menu and discussed more favorable options, pt and pt wife express appreciation, note pt forgot that he ate 25% of lunch as well, RN had to remind pt and inform pt wife. Discussed protein/calorie benefits of Boost plus, pt receptive to try, encouraged PO intake and Boost as snacks, informed RN of ONS recommendation. Pt reported not experiencing any N/V/D or abd pain, denied chewing/swallowing difficulties, stated UBW generally around 205 lbs, pt appeared well nourished. Reviewed chart: LBM 10/28 (x 4 days no BM); Skin: cyanotic, dry; Lex score: 16 (mild risk); Edema: None. Labs, meds, weight reviewed. Note senna docusate, heparin in D5W, potassium phosphate. Weight charted 10/28 246 lbs, 11/1 220 lbs (BMI 27.50, Normal for age), -26 lb wt loss (10% wt change) x 4 days, re weigh for accuracy warranted, note weight charted 6/3 220 lbs, wt stable x 5 months. No NFPE warranted at this time, pt appeared nourished and BMI WNL. RD will continue to monitor.         Nutrition Risk Screen    Nutrition Risk Screen: tube feeding or parenteral nutrition    Nutrition/Diet History    Spiritual, Cultural Beliefs, Religion Practices, Values that Affect Care:  (KRUPA)  Food Allergies: NKFA  Factors Affecting " "Nutritional Intake: NPO, on mechanical ventilation    Anthropometrics    Temp: 100 °F (37.8 °C)  Height: 6' 3" (190.5 cm)  Height (inches): 75 in  Weight Method: Bed Scale  Weight: 99.8 kg (220 lb)  Weight (lb): 220 lb  Ideal Body Weight (IBW), Male: 196 lb  % Ideal Body Weight, Male (lb): 112.24 %  BMI (Calculated): 27.5  BMI Grade: 30 - 34.9- obesity - grade I     Wt Readings from Last 15 Encounters:   11/01/23 99.8 kg (220 lb)   06/02/23 95.3 kg (210 lb 1.6 oz)   06/03/22 100.2 kg (220 lb 14.4 oz)     Lab/Procedures/Meds    Pertinent Labs Reviewed: reviewed  Pertinent Medications Reviewed: reviewed  BMP  Lab Results   Component Value Date     11/01/2023    K 3.7 11/01/2023     11/01/2023    CO2 26 11/01/2023    BUN 12 11/01/2023    CREATININE 1.0 11/01/2023    CALCIUM 7.9 (L) 11/01/2023    ANIONGAP 10 11/01/2023    EGFRNORACEVR >60 11/01/2023     Lab Results   Component Value Date    CALCIUM 7.9 (L) 11/01/2023    PHOS 1.5 (L) 11/01/2023     Lab Results   Component Value Date    ALBUMIN 2.5 (L) 11/01/2023     Lab Results   Component Value Date     (H) 11/01/2023     (H) 11/01/2023    ALKPHOS 40 (L) 11/01/2023    BILITOT 0.7 11/01/2023     Recent Labs   Lab 11/01/23  1135   POCTGLUCOSE 106     No results found for: "LABA1C", "HGBA1C"    Scheduled Meds:   amiodarone  200 mg Oral BID    aspirin  81 mg Oral Daily    atorvastatin  80 mg Oral Daily    famotidine  20 mg Oral BID    LIDOcaine  1 patch Transdermal Q24H    metoprolol succinate  25 mg Oral Daily    mupirocin   Nasal BID    potassium phosphate IVPB  30 mmol Intravenous Once    senna-docusate 8.6-50 mg  1 tablet Oral BID     Continuous Infusions:   heparin (porcine) in D5W 16 Units/kg/hr (11/01/23 1500)    nitroGLYCERIN 40 mcg/min (11/01/23 1500)    sodium bicarbonate 25 mEq in dextrose 5 % (D5W) 1,000 mL Purge Solution for Impella 13.6 mL/hr at 11/01/23 1500     PRN Meds:.acetaminophen, heparin (PORCINE), heparin (PORCINE), " hydrALAZINE, HYDROcodone-acetaminophen, influenza 65up-adj, magnesium sulfate IVPB, magnesium sulfate IVPB, nitroGLYCERIN, ondansetron, potassium chloride in water **AND** potassium chloride in water **AND** potassium chloride in water, sodium chloride 0.9%, sodium chloride 0.9%, sodium phosphate 15 mmol in dextrose 5 % (D5W) 250 mL IVPB, sodium phosphate 20.01 mmol in dextrose 5 % (D5W) 250 mL IVPB, sodium phosphate 30 mmol in dextrose 5 % (D5W) 250 mL IVPB    Physical Findings/Assessment         Estimated/Assessed Needs    Weight Used For Calorie Calculations: 99.8 kg (220 lb 0.3 oz)  Energy Calorie Requirements (kcal): 2323 kcals (MSJ x 1.25 AF (Critical care- non vent, BMI 27.50)  Energy Need Method: [place]  Protein Requirements: 120-200 g (1.2-2.0 g/kg ABW (Critical care, BMI 27.50)  Weight Used For Protein Calculations: 99.8 kg (220 lb 0.3 oz)  Fluid Requirements (mL): 2323 mL (1 mL/kcal)  Estimated Fluid Requirement Method: RDA Method  RDA Method (mL): 2323  CHO Requirement: 290 g (2323 kcals/8)      Nutrition Prescription Ordered    Current Diet Order: Cardiac diet    Evaluation of Received Nutrient/Fluid Intake  I/O: (Net since admit)   10/28: -285 mL  11/1: +1403.4 mL     Energy Calories Required: not meeting needs  Protein Required: not meeting needs  Fluid Required: not meeting needs  Total Fluid Intake (mL): 536.7  Tolerance: tolerating  % Intake of Estimated Energy Needs: 0 - 25 %  % Meal Intake: 25%    Nutrition Risk    Level of Risk/Frequency of Follow-up: high (F/u x 2 weekly)     Monitor and Evaluation    Food and Nutrient Intake: energy intake, enteral nutrition intake  Food and Nutrient Adminstration: enteral and parenteral nutrition administration  Physical Activity and Function: factors affecting access to physical activity, nutrition-related ADLs and IADLs  Anthropometric Measurements: weight, body mass index  Biochemical Data, Medical Tests and Procedures: electrolyte and renal  panel, glucose/endocrine profile, inflammatory profile, lipid profile  Nutrition-Focused Physical Findings: overall appearance, skin, extremities, muscles and bones     Nutrition Follow-Up    RD Follow-up?: Yes  Ernestine Mercado, Registration Eligible, Provisional LDN

## 2023-11-01 NOTE — SUBJECTIVE & OBJECTIVE
Interval History:     Review of Systems   Constitutional: Negative. Negative for weight gain.   HENT: Negative.     Eyes: Negative.    Cardiovascular: Negative.  Negative for chest pain, leg swelling and palpitations.   Respiratory: Negative.  Negative for shortness of breath.    Endocrine: Negative.    Hematologic/Lymphatic: Negative.    Skin: Negative.    Musculoskeletal:  Negative for muscle weakness.   Gastrointestinal: Negative.    Genitourinary: Negative.    Neurological: Negative.  Negative for dizziness.   Psychiatric/Behavioral: Negative.     Allergic/Immunologic: Negative.    All other systems reviewed and are negative.    Objective:     Vital Signs (Most Recent):  Temp: 99.9 °F (37.7 °C) (11/01/23 0925)  Pulse: 80 (11/01/23 0910)  Resp: (Abnormal) 24 (11/01/23 0910)  BP: (Abnormal) 140/84 (11/01/23 0900)  SpO2: 98 % (11/01/23 0910) Vital Signs (24h Range):  Temp:  [97.8 °F (36.6 °C)-99.9 °F (37.7 °C)] 99.9 °F (37.7 °C)  Pulse:  [72-91] 80  Resp:  [0-83] 24  SpO2:  [89 %-99 %] 98 %  BP: (102-161)/(60-86) 140/84  Arterial Line BP: (107-157)/(59-79) 157/70     Weight: 99.8 kg (220 lb 0.3 oz)  Body mass index is 27.5 kg/m².     SpO2: 98 %         Intake/Output Summary (Last 24 hours) at 11/1/2023 0952  Last data filed at 11/1/2023 0900  Gross per 24 hour   Intake 2867.05 ml   Output 3135 ml   Net -267.95 ml       Lines/Drains/Airways       Central Venous Catheter Line       Name Duration    Percutaneous Central Line Insertion/Assessment - Triple Lumen  10/28/23 0950 Internal Jugular Right 4 days              Drain       Name Duration         Urethral Catheter 10/28/23 1318 Temperature probe 3 days              Arterial Line       Name Duration    Arterial Line 10/28/23 1830 Left Femoral 3 days              Line       Name Duration         VAD 10/31/23 1025 Left ventricular assist device Impella <1 day              Peripheral Intravenous Line       Name Duration         Peripheral IV - Single Lumen 10/28/23  0915 20 G Left;Posterior Forearm 4 days                       Physical Exam  Vitals and nursing note reviewed.   Constitutional:       Appearance: He is well-developed.   HENT:      Head: Normocephalic and atraumatic.   Eyes:      Conjunctiva/sclera: Conjunctivae normal.      Pupils: Pupils are equal, round, and reactive to light.   Cardiovascular:      Rate and Rhythm: Normal rate and regular rhythm.      Pulses: Intact distal pulses.      Heart sounds: Normal heart sounds.   Pulmonary:      Effort: Pulmonary effort is normal.      Breath sounds: Normal breath sounds.   Abdominal:      General: Bowel sounds are normal.      Palpations: Abdomen is soft.   Musculoskeletal:      Cervical back: Normal range of motion and neck supple.   Skin:     General: Skin is warm and dry.   Neurological:      Mental Status: He is alert and oriented to person, place, and time.            Significant Labs: All pertinent lab results from the last 24 hours have been reviewed.    Significant Imaging: X-Ray: CXR: X-Ray Chest 1 View (CXR): No results found for this visit on 10/28/23.

## 2023-11-02 ENCOUNTER — ANESTHESIA (OUTPATIENT)
Dept: SURGERY | Facility: HOSPITAL | Age: 68
DRG: 215 | End: 2023-11-02
Payer: MEDICARE

## 2023-11-02 PROBLEM — N17.9 AKI (ACUTE KIDNEY INJURY): Status: RESOLVED | Noted: 2023-10-28 | Resolved: 2023-11-02

## 2023-11-02 LAB
ALBUMIN SERPL BCP-MCNC: 2.4 G/DL (ref 3.5–5.2)
ALBUMIN SERPL BCP-MCNC: 2.5 G/DL (ref 3.5–5.2)
ALLENS TEST: ABNORMAL
ALP SERPL-CCNC: 39 U/L (ref 55–135)
ALP SERPL-CCNC: 44 U/L (ref 55–135)
ALT SERPL W/O P-5'-P-CCNC: 102 U/L (ref 10–44)
ALT SERPL W/O P-5'-P-CCNC: 81 U/L (ref 10–44)
ANION GAP SERPL CALC-SCNC: 10 MMOL/L (ref 8–16)
ANION GAP SERPL CALC-SCNC: 11 MMOL/L (ref 8–16)
ANION GAP SERPL CALC-SCNC: 9 MMOL/L (ref 8–16)
APTT PPP: 26.2 SEC (ref 21–32)
APTT PPP: 26.3 SEC (ref 21–32)
AST SERPL-CCNC: 83 U/L (ref 10–40)
AST SERPL-CCNC: 94 U/L (ref 10–40)
BACTERIA BLD CULT: NORMAL
BACTERIA BLD CULT: NORMAL
BASOPHILS # BLD AUTO: 0.03 K/UL (ref 0–0.2)
BASOPHILS NFR BLD: 0.3 % (ref 0–1.9)
BILIRUB SERPL-MCNC: 0.6 MG/DL (ref 0.1–1)
BILIRUB SERPL-MCNC: 0.8 MG/DL (ref 0.1–1)
BUN SERPL-MCNC: 13 MG/DL (ref 8–23)
BUN SERPL-MCNC: 15 MG/DL (ref 8–23)
BUN SERPL-MCNC: 16 MG/DL (ref 8–23)
CALCIUM SERPL-MCNC: 7.7 MG/DL (ref 8.7–10.5)
CALCIUM SERPL-MCNC: 7.8 MG/DL (ref 8.7–10.5)
CALCIUM SERPL-MCNC: 8.6 MG/DL (ref 8.7–10.5)
CHLORIDE SERPL-SCNC: 108 MMOL/L (ref 95–110)
CHLORIDE SERPL-SCNC: 110 MMOL/L (ref 95–110)
CHLORIDE SERPL-SCNC: 112 MMOL/L (ref 95–110)
CO2 SERPL-SCNC: 21 MMOL/L (ref 23–29)
CO2 SERPL-SCNC: 23 MMOL/L (ref 23–29)
CO2 SERPL-SCNC: 25 MMOL/L (ref 23–29)
CREAT SERPL-MCNC: 0.9 MG/DL (ref 0.5–1.4)
CREAT SERPL-MCNC: 1.1 MG/DL (ref 0.5–1.4)
CREAT SERPL-MCNC: 1.2 MG/DL (ref 0.5–1.4)
DELSYS: ABNORMAL
DELSYS: ABNORMAL
DIFFERENTIAL METHOD: ABNORMAL
EOSINOPHIL # BLD AUTO: 0.1 K/UL (ref 0–0.5)
EOSINOPHIL NFR BLD: 1 % (ref 0–8)
ERYTHROCYTE [DISTWIDTH] IN BLOOD BY AUTOMATED COUNT: 13.7 % (ref 11.5–14.5)
ERYTHROCYTE [DISTWIDTH] IN BLOOD BY AUTOMATED COUNT: 13.9 % (ref 11.5–14.5)
ERYTHROCYTE [DISTWIDTH] IN BLOOD BY AUTOMATED COUNT: 13.9 % (ref 11.5–14.5)
ERYTHROCYTE [SEDIMENTATION RATE] IN BLOOD BY WESTERGREN METHOD: 16 MM/H
EST. GFR  (NO RACE VARIABLE): >60 ML/MIN/1.73 M^2
ESTIMATED AVG GLUCOSE: 111 MG/DL (ref 68–131)
FIBRINOGEN PPP-MCNC: 429 MG/DL (ref 182–400)
FIBRINOGEN PPP-MCNC: 441 MG/DL (ref 182–400)
FIO2: 100
FIO2: 40
GLUCOSE SERPL-MCNC: 117 MG/DL (ref 70–110)
GLUCOSE SERPL-MCNC: 117 MG/DL (ref 70–110)
GLUCOSE SERPL-MCNC: 145 MG/DL (ref 70–110)
GLUCOSE SERPL-MCNC: 163 MG/DL (ref 70–110)
GLUCOSE SERPL-MCNC: 164 MG/DL (ref 70–110)
GLUCOSE SERPL-MCNC: 168 MG/DL (ref 70–110)
GLUCOSE SERPL-MCNC: 168 MG/DL (ref 70–110)
GLUCOSE SERPL-MCNC: 171 MG/DL (ref 70–110)
GLUCOSE SERPL-MCNC: 176 MG/DL (ref 70–110)
GLUCOSE SERPL-MCNC: 177 MG/DL (ref 70–110)
GLUCOSE SERPL-MCNC: 177 MG/DL (ref 70–110)
GLUCOSE SERPL-MCNC: 179 MG/DL (ref 70–110)
GLUCOSE SERPL-MCNC: 183 MG/DL (ref 70–110)
HBA1C MFR BLD: 5.5 % (ref 4–5.6)
HCO3 UR-SCNC: 24.2 MMOL/L (ref 24–28)
HCO3 UR-SCNC: 24.5 MMOL/L (ref 24–28)
HCO3 UR-SCNC: 24.7 MMOL/L (ref 24–28)
HCO3 UR-SCNC: 24.9 MMOL/L (ref 24–28)
HCO3 UR-SCNC: 25.6 MMOL/L (ref 24–28)
HCO3 UR-SCNC: 26.4 MMOL/L (ref 24–28)
HCO3 UR-SCNC: 27 MMOL/L (ref 24–28)
HCO3 UR-SCNC: 28.2 MMOL/L (ref 24–28)
HCO3 UR-SCNC: 29.1 MMOL/L (ref 24–28)
HCO3 UR-SCNC: 29.6 MMOL/L (ref 24–28)
HCT VFR BLD AUTO: 34.6 % (ref 40–54)
HCT VFR BLD AUTO: 34.7 % (ref 40–54)
HCT VFR BLD AUTO: 40.2 % (ref 40–54)
HCT VFR BLD CALC: 27 %PCV (ref 36–54)
HCT VFR BLD CALC: 31 %PCV (ref 36–54)
HCT VFR BLD CALC: 32 %PCV (ref 36–54)
HCT VFR BLD CALC: 33 %PCV (ref 36–54)
HCT VFR BLD CALC: 37 %PCV (ref 36–54)
HGB BLD-MCNC: 11.2 G/DL (ref 14–18)
HGB BLD-MCNC: 11.3 G/DL (ref 14–18)
HGB BLD-MCNC: 13.5 G/DL (ref 14–18)
IMM GRANULOCYTES # BLD AUTO: 0.07 K/UL (ref 0–0.04)
IMM GRANULOCYTES NFR BLD AUTO: 0.8 % (ref 0–0.5)
INR PPP: 1.2 (ref 0.8–1.2)
INR PPP: 1.3 (ref 0.8–1.2)
INR PPP: 1.3 (ref 0.8–1.2)
LYMPHOCYTES # BLD AUTO: 0.8 K/UL (ref 1–4.8)
LYMPHOCYTES NFR BLD: 9.3 % (ref 18–48)
MAGNESIUM SERPL-MCNC: 1.8 MG/DL (ref 1.6–2.6)
MAGNESIUM SERPL-MCNC: 2.3 MG/DL (ref 1.6–2.6)
MAGNESIUM SERPL-MCNC: 2.8 MG/DL (ref 1.6–2.6)
MCH RBC QN AUTO: 29.5 PG (ref 27–31)
MCH RBC QN AUTO: 29.6 PG (ref 27–31)
MCH RBC QN AUTO: 30 PG (ref 27–31)
MCHC RBC AUTO-ENTMCNC: 32.3 G/DL (ref 32–36)
MCHC RBC AUTO-ENTMCNC: 32.7 G/DL (ref 32–36)
MCHC RBC AUTO-ENTMCNC: 33.6 G/DL (ref 32–36)
MCV RBC AUTO: 88 FL (ref 82–98)
MCV RBC AUTO: 91 FL (ref 82–98)
MCV RBC AUTO: 92 FL (ref 82–98)
MODE: ABNORMAL
MODE: ABNORMAL
MONOCYTES # BLD AUTO: 1 K/UL (ref 0.3–1)
MONOCYTES NFR BLD: 10.8 % (ref 4–15)
NEUTROPHILS # BLD AUTO: 7 K/UL (ref 1.8–7.7)
NEUTROPHILS NFR BLD: 77.8 % (ref 38–73)
NRBC BLD-RTO: 0 /100 WBC
PCO2 BLDA: 34.2 MMHG (ref 35–45)
PCO2 BLDA: 39.2 MMHG (ref 35–45)
PCO2 BLDA: 40.5 MMHG (ref 35–45)
PCO2 BLDA: 43 MMHG (ref 35–45)
PCO2 BLDA: 44.5 MMHG (ref 35–45)
PCO2 BLDA: 45.7 MMHG (ref 35–45)
PCO2 BLDA: 46.4 MMHG (ref 35–45)
PCO2 BLDA: 46.5 MMHG (ref 35–45)
PCO2 BLDA: 48.3 MMHG (ref 35–45)
PCO2 BLDA: 53.7 MMHG (ref 35–45)
PEEP: 5
PEEP: 5
PH SMN: 7.31 [PH] (ref 7.35–7.45)
PH SMN: 7.33 [PH] (ref 7.35–7.45)
PH SMN: 7.34 [PH] (ref 7.35–7.45)
PH SMN: 7.34 [PH] (ref 7.35–7.45)
PH SMN: 7.38 [PH] (ref 7.35–7.45)
PH SMN: 7.4 [PH] (ref 7.35–7.45)
PH SMN: 7.41 [PH] (ref 7.35–7.45)
PH SMN: 7.42 [PH] (ref 7.35–7.45)
PH SMN: 7.45 [PH] (ref 7.35–7.45)
PH SMN: 7.47 [PH] (ref 7.35–7.45)
PHOSPHATE SERPL-MCNC: 2.4 MG/DL (ref 2.7–4.5)
PLATELET # BLD AUTO: 145 K/UL (ref 150–450)
PLATELET # BLD AUTO: 146 K/UL (ref 150–450)
PLATELET # BLD AUTO: 147 K/UL (ref 150–450)
PMV BLD AUTO: 10.1 FL (ref 9.2–12.9)
PMV BLD AUTO: 10.2 FL (ref 9.2–12.9)
PMV BLD AUTO: 10.2 FL (ref 9.2–12.9)
PO2 BLDA: 103 MMHG (ref 80–100)
PO2 BLDA: 111 MMHG (ref 80–100)
PO2 BLDA: 134 MMHG (ref 80–100)
PO2 BLDA: 135 MMHG (ref 80–100)
PO2 BLDA: 162 MMHG (ref 80–100)
PO2 BLDA: 255 MMHG (ref 80–100)
PO2 BLDA: 537 MMHG (ref 80–100)
PO2 BLDA: 74 MMHG (ref 80–100)
PO2 BLDA: 80 MMHG (ref 80–100)
PO2 BLDA: 92 MMHG (ref 80–100)
POC ACTIVATED CLOTTING TIME K: 125 SEC (ref 74–137)
POC ACTIVATED CLOTTING TIME K: 137 SEC (ref 74–137)
POC ACTIVATED CLOTTING TIME K: 492 SEC (ref 74–137)
POC ACTIVATED CLOTTING TIME K: 516 SEC (ref 74–137)
POC ACTIVATED CLOTTING TIME K: 534 SEC (ref 74–137)
POC ACTIVATED CLOTTING TIME K: 546 SEC (ref 74–137)
POC ACTIVATED CLOTTING TIME K: 570 SEC (ref 74–137)
POC BE: -1 MMOL/L
POC BE: -2 MMOL/L
POC BE: -2 MMOL/L
POC BE: 1 MMOL/L
POC BE: 1 MMOL/L
POC BE: 2 MMOL/L
POC BE: 3 MMOL/L
POC BE: 5 MMOL/L
POC IONIZED CALCIUM: 1.11 MMOL/L (ref 1.06–1.42)
POC IONIZED CALCIUM: 1.13 MMOL/L (ref 1.06–1.42)
POC IONIZED CALCIUM: 1.15 MMOL/L (ref 1.06–1.42)
POC IONIZED CALCIUM: 1.16 MMOL/L (ref 1.06–1.42)
POC IONIZED CALCIUM: 1.16 MMOL/L (ref 1.06–1.42)
POC IONIZED CALCIUM: 1.19 MMOL/L (ref 1.06–1.42)
POC IONIZED CALCIUM: 1.22 MMOL/L (ref 1.06–1.42)
POC IONIZED CALCIUM: 1.24 MMOL/L (ref 1.06–1.42)
POC IONIZED CALCIUM: 1.25 MMOL/L (ref 1.06–1.42)
POC IONIZED CALCIUM: 1.31 MMOL/L (ref 1.06–1.42)
POC SATURATED O2: 100 % (ref 95–100)
POC SATURATED O2: 100 % (ref 95–100)
POC SATURATED O2: 95 % (ref 95–100)
POC SATURATED O2: 96 % (ref 95–100)
POC SATURATED O2: 97 % (ref 95–100)
POC SATURATED O2: 97 % (ref 95–100)
POC SATURATED O2: 98 % (ref 95–100)
POC SATURATED O2: 99 % (ref 95–100)
POCT GLUCOSE: 159 MG/DL (ref 70–110)
POCT GLUCOSE: 160 MG/DL (ref 70–110)
POCT GLUCOSE: 161 MG/DL (ref 70–110)
POCT GLUCOSE: 169 MG/DL (ref 70–110)
POCT GLUCOSE: 170 MG/DL (ref 70–110)
POCT GLUCOSE: 173 MG/DL (ref 70–110)
POCT GLUCOSE: 205 MG/DL (ref 70–110)
POCT GLUCOSE: 205 MG/DL (ref 70–110)
POCT GLUCOSE: 228 MG/DL (ref 70–110)
POTASSIUM BLD-SCNC: 3.9 MMOL/L (ref 3.5–5.1)
POTASSIUM BLD-SCNC: 4.3 MMOL/L (ref 3.5–5.1)
POTASSIUM BLD-SCNC: 4.4 MMOL/L (ref 3.5–5.1)
POTASSIUM BLD-SCNC: 4.4 MMOL/L (ref 3.5–5.1)
POTASSIUM BLD-SCNC: 4.7 MMOL/L (ref 3.5–5.1)
POTASSIUM BLD-SCNC: 4.8 MMOL/L (ref 3.5–5.1)
POTASSIUM BLD-SCNC: 5.1 MMOL/L (ref 3.5–5.1)
POTASSIUM BLD-SCNC: 5.2 MMOL/L (ref 3.5–5.1)
POTASSIUM BLD-SCNC: 5.5 MMOL/L (ref 3.5–5.1)
POTASSIUM BLD-SCNC: 6 MMOL/L (ref 3.5–5.1)
POTASSIUM SERPL-SCNC: 4 MMOL/L (ref 3.5–5.1)
POTASSIUM SERPL-SCNC: 4.5 MMOL/L (ref 3.5–5.1)
POTASSIUM SERPL-SCNC: 4.8 MMOL/L (ref 3.5–5.1)
PREALB SERPL-MCNC: 13 MG/DL (ref 20–43)
PROT SERPL-MCNC: 4.4 G/DL (ref 6–8.4)
PROT SERPL-MCNC: 5.1 G/DL (ref 6–8.4)
PROTHROMBIN TIME: 12.3 SEC (ref 9–12.5)
PROTHROMBIN TIME: 13.8 SEC (ref 9–12.5)
PROTHROMBIN TIME: 13.9 SEC (ref 9–12.5)
PS: 10
RBC # BLD AUTO: 3.77 M/UL (ref 4.6–6.2)
RBC # BLD AUTO: 3.8 M/UL (ref 4.6–6.2)
RBC # BLD AUTO: 4.56 M/UL (ref 4.6–6.2)
SAMPLE: ABNORMAL
SAMPLE: NORMAL
SAMPLE: NORMAL
SITE: ABNORMAL
SODIUM BLD-SCNC: 138 MMOL/L (ref 136–145)
SODIUM BLD-SCNC: 139 MMOL/L (ref 136–145)
SODIUM BLD-SCNC: 139 MMOL/L (ref 136–145)
SODIUM BLD-SCNC: 140 MMOL/L (ref 136–145)
SODIUM BLD-SCNC: 141 MMOL/L (ref 136–145)
SODIUM BLD-SCNC: 142 MMOL/L (ref 136–145)
SODIUM BLD-SCNC: 142 MMOL/L (ref 136–145)
SODIUM BLD-SCNC: 143 MMOL/L (ref 136–145)
SODIUM SERPL-SCNC: 142 MMOL/L (ref 136–145)
SODIUM SERPL-SCNC: 143 MMOL/L (ref 136–145)
SODIUM SERPL-SCNC: 144 MMOL/L (ref 136–145)
SPONT RATE: 26
VT: 500
WBC # BLD AUTO: 21 K/UL (ref 3.9–12.7)
WBC # BLD AUTO: 22.89 K/UL (ref 3.9–12.7)
WBC # BLD AUTO: 9.02 K/UL (ref 3.9–12.7)

## 2023-11-02 PROCEDURE — 85027 COMPLETE CBC AUTOMATED: CPT | Performed by: INTERNAL MEDICINE

## 2023-11-02 PROCEDURE — 85730 THROMBOPLASTIN TIME PARTIAL: CPT | Mod: 91 | Performed by: THORACIC SURGERY (CARDIOTHORACIC VASCULAR SURGERY)

## 2023-11-02 PROCEDURE — 85027 COMPLETE CBC AUTOMATED: CPT | Mod: 91 | Performed by: THORACIC SURGERY (CARDIOTHORACIC VASCULAR SURGERY)

## 2023-11-02 PROCEDURE — 85014 HEMATOCRIT: CPT

## 2023-11-02 PROCEDURE — 84132 ASSAY OF SERUM POTASSIUM: CPT

## 2023-11-02 PROCEDURE — 27800903 OPTIME MED/SURG SUP & DEVICES OTHER IMPLANTS: Performed by: THORACIC SURGERY (CARDIOTHORACIC VASCULAR SURGERY)

## 2023-11-02 PROCEDURE — 85025 COMPLETE CBC W/AUTO DIFF WBC: CPT | Performed by: INTERNAL MEDICINE

## 2023-11-02 PROCEDURE — 25000003 PHARM REV CODE 250: Performed by: NURSE ANESTHETIST, CERTIFIED REGISTERED

## 2023-11-02 PROCEDURE — 84100 ASSAY OF PHOSPHORUS: CPT | Performed by: INTERNAL MEDICINE

## 2023-11-02 PROCEDURE — 25000003 PHARM REV CODE 250: Performed by: INTERNAL MEDICINE

## 2023-11-02 PROCEDURE — 84295 ASSAY OF SERUM SODIUM: CPT

## 2023-11-02 PROCEDURE — P9045 ALBUMIN (HUMAN), 5%, 250 ML: HCPCS | Mod: JZ,JG | Performed by: THORACIC SURGERY (CARDIOTHORACIC VASCULAR SURGERY)

## 2023-11-02 PROCEDURE — 94640 AIRWAY INHALATION TREATMENT: CPT

## 2023-11-02 PROCEDURE — 82330 ASSAY OF CALCIUM: CPT

## 2023-11-02 PROCEDURE — 33508 PR ENDOSCOPY W/VIDEO-ASST VEIN HARVEST,CABG: ICD-10-PCS | Mod: 59,,, | Performed by: THORACIC SURGERY (CARDIOTHORACIC VASCULAR SURGERY)

## 2023-11-02 PROCEDURE — 25000003 PHARM REV CODE 250: Performed by: THORACIC SURGERY (CARDIOTHORACIC VASCULAR SURGERY)

## 2023-11-02 PROCEDURE — 33508 ENDOSCOPIC VEIN HARVEST: CPT | Mod: 59,,, | Performed by: THORACIC SURGERY (CARDIOTHORACIC VASCULAR SURGERY)

## 2023-11-02 PROCEDURE — 85610 PROTHROMBIN TIME: CPT | Mod: 91 | Performed by: INTERNAL MEDICINE

## 2023-11-02 PROCEDURE — P9045 ALBUMIN (HUMAN), 5%, 250 ML: HCPCS | Mod: JZ,JG | Performed by: NURSE ANESTHETIST, CERTIFIED REGISTERED

## 2023-11-02 PROCEDURE — 83735 ASSAY OF MAGNESIUM: CPT | Mod: 91 | Performed by: THORACIC SURGERY (CARDIOTHORACIC VASCULAR SURGERY)

## 2023-11-02 PROCEDURE — 85730 THROMBOPLASTIN TIME PARTIAL: CPT | Performed by: INTERNAL MEDICINE

## 2023-11-02 PROCEDURE — 83735 ASSAY OF MAGNESIUM: CPT | Performed by: INTERNAL MEDICINE

## 2023-11-02 PROCEDURE — 33518 PR CABG, ARTERY-VEIN, TWO: ICD-10-PCS | Mod: ,,, | Performed by: THORACIC SURGERY (CARDIOTHORACIC VASCULAR SURGERY)

## 2023-11-02 PROCEDURE — 82803 BLOOD GASES ANY COMBINATION: CPT

## 2023-11-02 PROCEDURE — 33533 PR CABG, ARTERIAL, SINGLE: ICD-10-PCS | Mod: ,,, | Performed by: THORACIC SURGERY (CARDIOTHORACIC VASCULAR SURGERY)

## 2023-11-02 PROCEDURE — C1729 CATH, DRAINAGE: HCPCS | Performed by: THORACIC SURGERY (CARDIOTHORACIC VASCULAR SURGERY)

## 2023-11-02 PROCEDURE — 99900017 HC EXTUBATION W/PARAMETERS (STAT)

## 2023-11-02 PROCEDURE — 94002 VENT MGMT INPAT INIT DAY: CPT

## 2023-11-02 PROCEDURE — 63600175 PHARM REV CODE 636 W HCPCS: Performed by: THORACIC SURGERY (CARDIOTHORACIC VASCULAR SURGERY)

## 2023-11-02 PROCEDURE — 99900035 HC TECH TIME PER 15 MIN (STAT)

## 2023-11-02 PROCEDURE — 85384 FIBRINOGEN ACTIVITY: CPT | Performed by: INTERNAL MEDICINE

## 2023-11-02 PROCEDURE — 37000009 HC ANESTHESIA EA ADD 15 MINS: Performed by: THORACIC SURGERY (CARDIOTHORACIC VASCULAR SURGERY)

## 2023-11-02 PROCEDURE — C1887 CATHETER, GUIDING: HCPCS | Performed by: THORACIC SURGERY (CARDIOTHORACIC VASCULAR SURGERY)

## 2023-11-02 PROCEDURE — 37000008 HC ANESTHESIA 1ST 15 MINUTES: Performed by: THORACIC SURGERY (CARDIOTHORACIC VASCULAR SURGERY)

## 2023-11-02 PROCEDURE — 37799 UNLISTED PX VASCULAR SURGERY: CPT

## 2023-11-02 PROCEDURE — 33518 CABG ARTERY-VEIN TWO: CPT | Mod: ,,, | Performed by: THORACIC SURGERY (CARDIOTHORACIC VASCULAR SURGERY)

## 2023-11-02 PROCEDURE — 27201423 OPTIME MED/SURG SUP & DEVICES STERILE SUPPLY: Performed by: THORACIC SURGERY (CARDIOTHORACIC VASCULAR SURGERY)

## 2023-11-02 PROCEDURE — 36000712 HC OR TIME LEV V 1ST 15 MIN: Performed by: THORACIC SURGERY (CARDIOTHORACIC VASCULAR SURGERY)

## 2023-11-02 PROCEDURE — 85610 PROTHROMBIN TIME: CPT | Mod: 91 | Performed by: THORACIC SURGERY (CARDIOTHORACIC VASCULAR SURGERY)

## 2023-11-02 PROCEDURE — 80053 COMPREHEN METABOLIC PANEL: CPT | Performed by: THORACIC SURGERY (CARDIOTHORACIC VASCULAR SURGERY)

## 2023-11-02 PROCEDURE — 25000242 PHARM REV CODE 250 ALT 637 W/ HCPCS: Performed by: THORACIC SURGERY (CARDIOTHORACIC VASCULAR SURGERY)

## 2023-11-02 PROCEDURE — 36000713 HC OR TIME LEV V EA ADD 15 MIN: Performed by: THORACIC SURGERY (CARDIOTHORACIC VASCULAR SURGERY)

## 2023-11-02 PROCEDURE — C9113 INJ PANTOPRAZOLE SODIUM, VIA: HCPCS | Performed by: THORACIC SURGERY (CARDIOTHORACIC VASCULAR SURGERY)

## 2023-11-02 PROCEDURE — 20000000 HC ICU ROOM

## 2023-11-02 PROCEDURE — 99232 PR SUBSEQUENT HOSPITAL CARE,LEVL II: ICD-10-PCS | Mod: ,,, | Performed by: INTERNAL MEDICINE

## 2023-11-02 PROCEDURE — C9290 INJ, BUPIVACAINE LIPOSOME: HCPCS | Performed by: THORACIC SURGERY (CARDIOTHORACIC VASCULAR SURGERY)

## 2023-11-02 PROCEDURE — 80048 BASIC METABOLIC PNL TOTAL CA: CPT | Mod: XB | Performed by: THORACIC SURGERY (CARDIOTHORACIC VASCULAR SURGERY)

## 2023-11-02 PROCEDURE — 27200667 HC PACEMAKER, TEMPORARY MONITORING, PER SHIFT

## 2023-11-02 PROCEDURE — A4216 STERILE WATER/SALINE, 10 ML: HCPCS | Performed by: NURSE ANESTHETIST, CERTIFIED REGISTERED

## 2023-11-02 PROCEDURE — 99232 SBSQ HOSP IP/OBS MODERATE 35: CPT | Mod: ,,, | Performed by: INTERNAL MEDICINE

## 2023-11-02 PROCEDURE — 63600175 PHARM REV CODE 636 W HCPCS: Performed by: NURSE ANESTHETIST, CERTIFIED REGISTERED

## 2023-11-02 PROCEDURE — 85384 FIBRINOGEN ACTIVITY: CPT | Mod: 91 | Performed by: THORACIC SURGERY (CARDIOTHORACIC VASCULAR SURGERY)

## 2023-11-02 PROCEDURE — 27100171 HC OXYGEN HIGH FLOW UP TO 24 HOURS

## 2023-11-02 PROCEDURE — 94799 UNLISTED PULMONARY SVC/PX: CPT

## 2023-11-02 PROCEDURE — 33533 CABG ARTERIAL SINGLE: CPT | Mod: ,,, | Performed by: THORACIC SURGERY (CARDIOTHORACIC VASCULAR SURGERY)

## 2023-11-02 PROCEDURE — 94761 N-INVAS EAR/PLS OXIMETRY MLT: CPT

## 2023-11-02 PROCEDURE — S0017 INJECTION, AMINOCAPROIC ACID: HCPCS | Performed by: NURSE ANESTHETIST, CERTIFIED REGISTERED

## 2023-11-02 PROCEDURE — C9399 UNCLASSIFIED DRUGS OR BIOLOG: HCPCS | Performed by: THORACIC SURGERY (CARDIOTHORACIC VASCULAR SURGERY)

## 2023-11-02 DEVICE — TEMP PACING WIRE
Type: IMPLANTABLE DEVICE | Site: HEART | Status: FUNCTIONAL
Brand: MYO/WIRE

## 2023-11-02 RX ORDER — POLYETHYLENE GLYCOL 3350 17 G/17G
17 POWDER, FOR SOLUTION ORAL DAILY
Status: DISCONTINUED | OUTPATIENT
Start: 2023-11-03 | End: 2023-11-07 | Stop reason: HOSPADM

## 2023-11-02 RX ORDER — CALCIUM GLUCONATE 20 MG/ML
1 INJECTION, SOLUTION INTRAVENOUS
Status: DISCONTINUED | OUTPATIENT
Start: 2023-11-02 | End: 2023-11-07 | Stop reason: HOSPADM

## 2023-11-02 RX ORDER — FUROSEMIDE 10 MG/ML
40 INJECTION INTRAMUSCULAR; INTRAVENOUS 2 TIMES DAILY
Status: DISCONTINUED | OUTPATIENT
Start: 2023-11-03 | End: 2023-11-05

## 2023-11-02 RX ORDER — DEXTROSE, SODIUM CHLORIDE, SODIUM LACTATE, POTASSIUM CHLORIDE, AND CALCIUM CHLORIDE 5; .6; .31; .03; .02 G/100ML; G/100ML; G/100ML; G/100ML; G/100ML
INJECTION, SOLUTION INTRAVENOUS CONTINUOUS
Status: ACTIVE | OUTPATIENT
Start: 2023-11-02 | End: 2023-11-03

## 2023-11-02 RX ORDER — FENTANYL CITRATE 50 UG/ML
INJECTION, SOLUTION INTRAMUSCULAR; INTRAVENOUS
Status: DISCONTINUED | OUTPATIENT
Start: 2023-11-02 | End: 2023-11-05

## 2023-11-02 RX ORDER — PANTOPRAZOLE SODIUM 40 MG/10ML
40 INJECTION, POWDER, LYOPHILIZED, FOR SOLUTION INTRAVENOUS DAILY
Status: DISCONTINUED | OUTPATIENT
Start: 2023-11-02 | End: 2023-11-07

## 2023-11-02 RX ORDER — MIDAZOLAM HYDROCHLORIDE 1 MG/ML
INJECTION, SOLUTION INTRAMUSCULAR; INTRAVENOUS
Status: DISCONTINUED | OUTPATIENT
Start: 2023-11-02 | End: 2023-11-10

## 2023-11-02 RX ORDER — ACETAMINOPHEN 650 MG/20.3ML
650 LIQUID ORAL EVERY 6 HOURS PRN
Status: DISCONTINUED | OUTPATIENT
Start: 2023-11-02 | End: 2023-11-03

## 2023-11-02 RX ORDER — CLOPIDOGREL BISULFATE 75 MG/1
75 TABLET ORAL DAILY
Status: DISCONTINUED | OUTPATIENT
Start: 2023-11-03 | End: 2023-11-07 | Stop reason: HOSPADM

## 2023-11-02 RX ORDER — ONDANSETRON 2 MG/ML
INJECTION INTRAMUSCULAR; INTRAVENOUS
Status: DISCONTINUED | OUTPATIENT
Start: 2023-11-02 | End: 2023-11-10

## 2023-11-02 RX ORDER — MAGNESIUM SULFATE HEPTAHYDRATE 40 MG/ML
INJECTION, SOLUTION INTRAVENOUS
Status: DISCONTINUED | OUTPATIENT
Start: 2023-11-02 | End: 2023-11-10

## 2023-11-02 RX ORDER — INDOMETHACIN 25 MG/1
100 CAPSULE ORAL ONCE
Status: COMPLETED | OUTPATIENT
Start: 2023-11-02 | End: 2023-11-02

## 2023-11-02 RX ORDER — POTASSIUM CHLORIDE 20 MEQ/1
20 TABLET, EXTENDED RELEASE ORAL EVERY 12 HOURS
Status: DISCONTINUED | OUTPATIENT
Start: 2023-11-03 | End: 2023-11-05

## 2023-11-02 RX ORDER — CEFAZOLIN SODIUM 1 G/3ML
INJECTION, POWDER, FOR SOLUTION INTRAMUSCULAR; INTRAVENOUS
Status: DISCONTINUED | OUTPATIENT
Start: 2023-11-02 | End: 2023-11-06

## 2023-11-02 RX ORDER — NOREPINEPHRINE BITARTRATE 1 MG/ML
INJECTION, SOLUTION INTRAVENOUS
Status: DISCONTINUED | OUTPATIENT
Start: 2023-11-02 | End: 2023-11-10

## 2023-11-02 RX ORDER — PHENYLEPHRINE HYDROCHLORIDE 10 MG/ML
INJECTION INTRAVENOUS
Status: DISCONTINUED | OUTPATIENT
Start: 2023-11-02 | End: 2023-11-10

## 2023-11-02 RX ORDER — POTASSIUM CHLORIDE 14.9 MG/ML
60 INJECTION INTRAVENOUS
Status: DISCONTINUED | OUTPATIENT
Start: 2023-11-02 | End: 2023-11-07 | Stop reason: HOSPADM

## 2023-11-02 RX ORDER — HEPARIN SODIUM 1000 [USP'U]/ML
INJECTION, SOLUTION INTRAVENOUS; SUBCUTANEOUS
Status: DISCONTINUED | OUTPATIENT
Start: 2023-11-02 | End: 2023-11-02 | Stop reason: HOSPADM

## 2023-11-02 RX ORDER — LIDOCAINE HYDROCHLORIDE 20 MG/ML
INJECTION, SOLUTION EPIDURAL; INFILTRATION; INTRACAUDAL; PERINEURAL
Status: DISCONTINUED | OUTPATIENT
Start: 2023-11-02 | End: 2023-11-10

## 2023-11-02 RX ORDER — POTASSIUM CHLORIDE 29.8 MG/ML
40 INJECTION INTRAVENOUS
Status: DISCONTINUED | OUTPATIENT
Start: 2023-11-02 | End: 2023-11-07 | Stop reason: HOSPADM

## 2023-11-02 RX ORDER — OXYCODONE HYDROCHLORIDE 5 MG/1
10 TABLET ORAL EVERY 4 HOURS PRN
Status: DISCONTINUED | OUTPATIENT
Start: 2023-11-02 | End: 2023-11-05

## 2023-11-02 RX ORDER — CALCIUM GLUCONATE 20 MG/ML
2 INJECTION, SOLUTION INTRAVENOUS
Status: DISCONTINUED | OUTPATIENT
Start: 2023-11-02 | End: 2023-11-07 | Stop reason: HOSPADM

## 2023-11-02 RX ORDER — AMINOCAPROIC ACID 250 MG/ML
INJECTION, SOLUTION INTRAVENOUS
Status: DISCONTINUED | OUTPATIENT
Start: 2023-11-02 | End: 2023-11-10

## 2023-11-02 RX ORDER — CHLORHEXIDINE GLUCONATE ORAL RINSE 1.2 MG/ML
10 SOLUTION DENTAL 2 TIMES DAILY
Status: DISCONTINUED | OUTPATIENT
Start: 2023-11-02 | End: 2023-11-07 | Stop reason: HOSPADM

## 2023-11-02 RX ORDER — ONDANSETRON 2 MG/ML
4 INJECTION INTRAMUSCULAR; INTRAVENOUS EVERY 12 HOURS PRN
Status: DISCONTINUED | OUTPATIENT
Start: 2023-11-02 | End: 2023-11-07 | Stop reason: HOSPADM

## 2023-11-02 RX ORDER — METOCLOPRAMIDE HYDROCHLORIDE 5 MG/ML
5 INJECTION INTRAMUSCULAR; INTRAVENOUS EVERY 6 HOURS PRN
Status: DISCONTINUED | OUTPATIENT
Start: 2023-11-02 | End: 2023-11-07 | Stop reason: HOSPADM

## 2023-11-02 RX ORDER — CEFAZOLIN SODIUM 2 G/50ML
2 SOLUTION INTRAVENOUS
Status: COMPLETED | OUTPATIENT
Start: 2023-11-02 | End: 2023-11-03

## 2023-11-02 RX ORDER — POTASSIUM CHLORIDE 14.9 MG/ML
20 INJECTION INTRAVENOUS
Status: DISCONTINUED | OUTPATIENT
Start: 2023-11-02 | End: 2023-11-07 | Stop reason: HOSPADM

## 2023-11-02 RX ORDER — CALCIUM GLUCONATE 20 MG/ML
3 INJECTION, SOLUTION INTRAVENOUS
Status: DISCONTINUED | OUTPATIENT
Start: 2023-11-02 | End: 2023-11-07 | Stop reason: HOSPADM

## 2023-11-02 RX ORDER — LANOLIN ALCOHOL/MO/W.PET/CERES
1 CREAM (GRAM) TOPICAL DAILY
Status: DISCONTINUED | OUTPATIENT
Start: 2023-11-03 | End: 2023-11-07 | Stop reason: HOSPADM

## 2023-11-02 RX ORDER — CEFAZOLIN SODIUM 2 G/50ML
2 SOLUTION INTRAVENOUS
Status: DISCONTINUED | OUTPATIENT
Start: 2023-11-02 | End: 2023-11-06

## 2023-11-02 RX ORDER — IPRATROPIUM BROMIDE AND ALBUTEROL SULFATE 2.5; .5 MG/3ML; MG/3ML
3 SOLUTION RESPIRATORY (INHALATION) EVERY 4 HOURS
Status: COMPLETED | OUTPATIENT
Start: 2023-11-02 | End: 2023-11-03

## 2023-11-02 RX ORDER — FENTANYL CITRATE 50 UG/ML
25 INJECTION, SOLUTION INTRAMUSCULAR; INTRAVENOUS
Status: DISCONTINUED | OUTPATIENT
Start: 2023-11-02 | End: 2023-11-05

## 2023-11-02 RX ORDER — HEPARIN SODIUM 1000 [USP'U]/ML
INJECTION, SOLUTION INTRAVENOUS; SUBCUTANEOUS
Status: DISCONTINUED | OUTPATIENT
Start: 2023-11-02 | End: 2023-11-10

## 2023-11-02 RX ORDER — ASPIRIN 81 MG/1
81 TABLET ORAL DAILY
Status: DISCONTINUED | OUTPATIENT
Start: 2023-11-03 | End: 2023-11-07 | Stop reason: HOSPADM

## 2023-11-02 RX ORDER — ACETAMINOPHEN 10 MG/ML
1000 INJECTION, SOLUTION INTRAVENOUS EVERY 8 HOURS
Status: COMPLETED | OUTPATIENT
Start: 2023-11-02 | End: 2023-11-02

## 2023-11-02 RX ORDER — ACETAMINOPHEN 10 MG/ML
1000 INJECTION, SOLUTION INTRAVENOUS EVERY 8 HOURS
Status: DISCONTINUED | OUTPATIENT
Start: 2023-11-02 | End: 2023-11-02 | Stop reason: SDUPTHER

## 2023-11-02 RX ORDER — LIDOCAINE HYDROCHLORIDE ANHYDROUS AND DEXTROSE MONOHYDRATE .8; 5 G/100ML; G/100ML
INJECTION, SOLUTION INTRAVENOUS CONTINUOUS PRN
Status: DISCONTINUED | OUTPATIENT
Start: 2023-11-02 | End: 2023-11-10

## 2023-11-02 RX ORDER — FOLIC ACID 1 MG/1
1 TABLET ORAL DAILY
Status: DISCONTINUED | OUTPATIENT
Start: 2023-11-04 | End: 2023-11-07 | Stop reason: HOSPADM

## 2023-11-02 RX ORDER — PAPAVERINE HYDROCHLORIDE 30 MG/ML
INJECTION INTRAMUSCULAR; INTRAVENOUS
Status: DISCONTINUED | OUTPATIENT
Start: 2023-11-02 | End: 2023-11-02 | Stop reason: HOSPADM

## 2023-11-02 RX ORDER — KETAMINE HCL IN 0.9 % NACL 50 MG/5 ML
SYRINGE (ML) INTRAVENOUS
Status: DISCONTINUED | OUTPATIENT
Start: 2023-11-02 | End: 2023-11-10

## 2023-11-02 RX ORDER — ASCORBIC ACID 500 MG
500 TABLET ORAL 2 TIMES DAILY
Status: DISCONTINUED | OUTPATIENT
Start: 2023-11-03 | End: 2023-11-07 | Stop reason: HOSPADM

## 2023-11-02 RX ORDER — CYANOCOBALAMIN (VITAMIN B-12) 250 MCG
1000 TABLET ORAL DAILY
Status: DISCONTINUED | OUTPATIENT
Start: 2023-11-04 | End: 2023-11-07 | Stop reason: HOSPADM

## 2023-11-02 RX ORDER — ROCURONIUM BROMIDE 10 MG/ML
INJECTION, SOLUTION INTRAVENOUS
Status: DISCONTINUED | OUTPATIENT
Start: 2023-11-02 | End: 2023-11-10

## 2023-11-02 RX ORDER — OXYCODONE HYDROCHLORIDE 5 MG/1
5 TABLET ORAL EVERY 4 HOURS PRN
Status: DISCONTINUED | OUTPATIENT
Start: 2023-11-02 | End: 2023-11-05

## 2023-11-02 RX ORDER — VASOPRESSIN IN DEXTROSE 5 % 25/250 ML
0.04 PLASTIC BAG, INJECTION (ML) INTRAVENOUS CONTINUOUS
Status: DISCONTINUED | OUTPATIENT
Start: 2023-11-02 | End: 2023-11-05

## 2023-11-02 RX ORDER — ALBUMIN HUMAN 50 G/1000ML
SOLUTION INTRAVENOUS
Status: DISCONTINUED | OUTPATIENT
Start: 2023-11-02 | End: 2023-11-10

## 2023-11-02 RX ORDER — EPINEPHRINE 1 MG/ML
INJECTION, SOLUTION INTRACARDIAC; INTRAMUSCULAR; INTRAVENOUS; SUBCUTANEOUS
Status: DISCONTINUED | OUTPATIENT
Start: 2023-11-02 | End: 2023-11-10

## 2023-11-02 RX ORDER — ADENOSINE 3 MG/ML
INJECTION INTRAVENOUS
Status: DISCONTINUED | OUTPATIENT
Start: 2023-11-02 | End: 2023-11-02 | Stop reason: HOSPADM

## 2023-11-02 RX ORDER — DEXMEDETOMIDINE HYDROCHLORIDE 4 UG/ML
0-1.4 INJECTION INTRAVENOUS CONTINUOUS
Status: DISCONTINUED | OUTPATIENT
Start: 2023-11-02 | End: 2023-11-05

## 2023-11-02 RX ORDER — BUPIVACAINE HYDROCHLORIDE 2.5 MG/ML
INJECTION, SOLUTION EPIDURAL; INFILTRATION; INTRACAUDAL
Status: DISCONTINUED | OUTPATIENT
Start: 2023-11-02 | End: 2023-11-02 | Stop reason: HOSPADM

## 2023-11-02 RX ORDER — PROPOFOL 10 MG/ML
VIAL (ML) INTRAVENOUS
Status: DISCONTINUED | OUTPATIENT
Start: 2023-11-02 | End: 2023-11-10

## 2023-11-02 RX ORDER — DOCUSATE SODIUM 100 MG/1
100 CAPSULE, LIQUID FILLED ORAL 2 TIMES DAILY
Status: DISCONTINUED | OUTPATIENT
Start: 2023-11-02 | End: 2023-11-07 | Stop reason: HOSPADM

## 2023-11-02 RX ORDER — INDOCYANINE GREEN AND WATER 25 MG
KIT INJECTION
Status: DISCONTINUED | OUTPATIENT
Start: 2023-11-02 | End: 2023-11-02 | Stop reason: HOSPADM

## 2023-11-02 RX ORDER — MAGNESIUM SULFATE HEPTAHYDRATE 40 MG/ML
4 INJECTION, SOLUTION INTRAVENOUS
Status: DISCONTINUED | OUTPATIENT
Start: 2023-11-02 | End: 2023-11-07 | Stop reason: HOSPADM

## 2023-11-02 RX ORDER — METOPROLOL TARTRATE 25 MG/1
25 TABLET, FILM COATED ORAL 2 TIMES DAILY
Status: DISCONTINUED | OUTPATIENT
Start: 2023-11-03 | End: 2023-11-07 | Stop reason: HOSPADM

## 2023-11-02 RX ORDER — ALBUMIN HUMAN 50 G/1000ML
25 SOLUTION INTRAVENOUS
Status: DISCONTINUED | OUTPATIENT
Start: 2023-11-02 | End: 2023-11-07 | Stop reason: HOSPADM

## 2023-11-02 RX ORDER — ADHESIVE BANDAGE
5 BANDAGE TOPICAL 2 TIMES DAILY
Status: DISCONTINUED | OUTPATIENT
Start: 2023-11-03 | End: 2023-11-07 | Stop reason: HOSPADM

## 2023-11-02 RX ORDER — FENTANYL CITRATE 50 UG/ML
12.5 INJECTION, SOLUTION INTRAMUSCULAR; INTRAVENOUS
Status: DISCONTINUED | OUTPATIENT
Start: 2023-11-02 | End: 2023-11-05

## 2023-11-02 RX ORDER — SODIUM CHLORIDE, SODIUM LACTATE, POTASSIUM CHLORIDE, CALCIUM CHLORIDE 600; 310; 30; 20 MG/100ML; MG/100ML; MG/100ML; MG/100ML
1000 INJECTION, SOLUTION INTRAVENOUS
Status: DISCONTINUED | OUTPATIENT
Start: 2023-11-02 | End: 2023-11-07 | Stop reason: HOSPADM

## 2023-11-02 RX ADMIN — ROCURONIUM BROMIDE 20 MG: 10 SOLUTION INTRAVENOUS at 10:11

## 2023-11-02 RX ADMIN — CEFAZOLIN 2 G: 330 INJECTION, POWDER, FOR SOLUTION INTRAMUSCULAR; INTRAVENOUS at 08:11

## 2023-11-02 RX ADMIN — VANCOMYCIN HYDROCHLORIDE 1000 MG: 1 INJECTION, POWDER, LYOPHILIZED, FOR SOLUTION INTRAVENOUS at 07:11

## 2023-11-02 RX ADMIN — PHENYLEPHRINE HYDROCHLORIDE 200 MCG: 10 INJECTION INTRAVENOUS at 08:11

## 2023-11-02 RX ADMIN — AMINOCAPROIC ACID 5 G: 250 INJECTION, SOLUTION INTRAVENOUS at 01:11

## 2023-11-02 RX ADMIN — PHENYLEPHRINE HYDROCHLORIDE 100 MCG: 10 INJECTION INTRAVENOUS at 10:11

## 2023-11-02 RX ADMIN — CEFAZOLIN SODIUM 2 G: 2 SOLUTION INTRAVENOUS at 09:11

## 2023-11-02 RX ADMIN — MIDAZOLAM 2 MG: 1 INJECTION INTRAMUSCULAR; INTRAVENOUS at 07:11

## 2023-11-02 RX ADMIN — CALCIUM CHLORIDE 1 G: 100 INJECTION, SOLUTION INTRAVENOUS at 12:11

## 2023-11-02 RX ADMIN — SODIUM BICARBONATE 100 MEQ: 84 INJECTION, SOLUTION INTRAVENOUS at 02:11

## 2023-11-02 RX ADMIN — NOREPINEPHRINE BITARTRATE 16 MCG: 1 INJECTION, SOLUTION, CONCENTRATE INTRAVENOUS at 12:11

## 2023-11-02 RX ADMIN — PHENYLEPHRINE HYDROCHLORIDE 200 MCG: 10 INJECTION INTRAVENOUS at 10:11

## 2023-11-02 RX ADMIN — NOREPINEPHRINE BITARTRATE 8 MCG: 1 INJECTION, SOLUTION, CONCENTRATE INTRAVENOUS at 12:11

## 2023-11-02 RX ADMIN — DOCUSATE SODIUM 100 MG: 100 CAPSULE, LIQUID FILLED ORAL at 09:11

## 2023-11-02 RX ADMIN — ROCURONIUM BROMIDE 30 MG: 10 SOLUTION INTRAVENOUS at 11:11

## 2023-11-02 RX ADMIN — VASOPRESSIN 0.02 UNITS/MIN: 20 INJECTION INTRAVENOUS at 06:11

## 2023-11-02 RX ADMIN — ROCURONIUM BROMIDE 10 MG: 10 SOLUTION INTRAVENOUS at 12:11

## 2023-11-02 RX ADMIN — EPINEPHRINE 0.03 MCG/KG/MIN: 1 INJECTION INTRAMUSCULAR; INTRAVENOUS; SUBCUTANEOUS at 12:11

## 2023-11-02 RX ADMIN — IPRATROPIUM BROMIDE AND ALBUTEROL SULFATE 3 ML: 2.5; .5 SOLUTION RESPIRATORY (INHALATION) at 03:11

## 2023-11-02 RX ADMIN — Medication 10 MG: at 01:11

## 2023-11-02 RX ADMIN — ROCURONIUM BROMIDE 50 MG: 10 SOLUTION INTRAVENOUS at 08:11

## 2023-11-02 RX ADMIN — PHENYLEPHRINE HYDROCHLORIDE 100 MCG: 10 INJECTION INTRAVENOUS at 09:11

## 2023-11-02 RX ADMIN — NOREPINEPHRINE BITARTRATE 16 MCG: 1 INJECTION, SOLUTION, CONCENTRATE INTRAVENOUS at 01:11

## 2023-11-02 RX ADMIN — DEXMEDETOMIDINE HYDROCHLORIDE 0.6 MCG/KG/HR: 4 INJECTION INTRAVENOUS at 09:11

## 2023-11-02 RX ADMIN — 0.12% CHLORHEXIDINE GLUCONATE 10 ML: 1.2 RINSE ORAL at 05:11

## 2023-11-02 RX ADMIN — HEPARIN SODIUM 40000 UNITS: 1000 INJECTION, SOLUTION INTRAVENOUS; SUBCUTANEOUS at 09:11

## 2023-11-02 RX ADMIN — SODIUM CHLORIDE, SODIUM LACTATE, POTASSIUM CHLORIDE, AND CALCIUM CHLORIDE: .6; .31; .03; .02 INJECTION, SOLUTION INTRAVENOUS at 12:11

## 2023-11-02 RX ADMIN — IPRATROPIUM BROMIDE AND ALBUTEROL SULFATE 3 ML: 2.5; .5 SOLUTION RESPIRATORY (INHALATION) at 07:11

## 2023-11-02 RX ADMIN — HEPARIN SODIUM 10000 UNITS: 1000 INJECTION, SOLUTION INTRAVENOUS; SUBCUTANEOUS at 09:11

## 2023-11-02 RX ADMIN — METOPROLOL SUCCINATE 25 MG: 25 TABLET, EXTENDED RELEASE ORAL at 06:11

## 2023-11-02 RX ADMIN — AMIODARONE HYDROCHLORIDE 200 MG: 200 TABLET ORAL at 09:11

## 2023-11-02 RX ADMIN — FENTANYL CITRATE 25 MCG: 50 INJECTION INTRAMUSCULAR; INTRAVENOUS at 02:11

## 2023-11-02 RX ADMIN — EPINEPHRINE 0.08 MCG/KG/MIN: 1 INJECTION INTRAMUSCULAR; INTRAVENOUS; SUBCUTANEOUS at 09:11

## 2023-11-02 RX ADMIN — DEXMEDETOMIDINE 0.3 MCG/KG/HR: 200 INJECTION, SOLUTION INTRAVENOUS at 10:11

## 2023-11-02 RX ADMIN — MAGNESIUM SULFATE HEPTAHYDRATE 2 G: 2 INJECTION, SOLUTION INTRAVENOUS at 07:11

## 2023-11-02 RX ADMIN — EPINEPHRINE 10 MCG: 1 INJECTION, SOLUTION INTRACARDIAC; INTRAMUSCULAR; INTRAVENOUS; SUBCUTANEOUS at 12:11

## 2023-11-02 RX ADMIN — FENTANYL CITRATE 25 MCG: 50 INJECTION, SOLUTION INTRAMUSCULAR; INTRAVENOUS at 07:11

## 2023-11-02 RX ADMIN — ROCURONIUM BROMIDE 30 MG: 10 SOLUTION INTRAVENOUS at 09:11

## 2023-11-02 RX ADMIN — PANTOPRAZOLE SODIUM 40 MG: 40 INJECTION, POWDER, FOR SOLUTION INTRAVENOUS at 02:11

## 2023-11-02 RX ADMIN — VASOPRESSIN 0.04 UNITS/MIN: 20 INJECTION INTRAVENOUS at 12:11

## 2023-11-02 RX ADMIN — FENTANYL CITRATE 25 MCG: 50 INJECTION, SOLUTION INTRAMUSCULAR; INTRAVENOUS at 09:11

## 2023-11-02 RX ADMIN — CEFAZOLIN 2 G: 330 INJECTION, POWDER, FOR SOLUTION INTRAMUSCULAR; INTRAVENOUS at 12:11

## 2023-11-02 RX ADMIN — SODIUM CHLORIDE, SODIUM LACTATE, POTASSIUM CHLORIDE, AND CALCIUM CHLORIDE: .6; .31; .03; .02 INJECTION, SOLUTION INTRAVENOUS at 07:11

## 2023-11-02 RX ADMIN — INSULIN HUMAN 3 UNITS: 100 INJECTION, SOLUTION PARENTERAL at 11:11

## 2023-11-02 RX ADMIN — SODIUM CHLORIDE, SODIUM LACTATE, POTASSIUM CHLORIDE, CALCIUM CHLORIDE AND DEXTROSE MONOHYDRATE: 5; 600; 310; 30; 20 INJECTION, SOLUTION INTRAVENOUS at 02:11

## 2023-11-02 RX ADMIN — LIDOCAINE HYDROCHLORIDE 100 MG: 20 INJECTION, SOLUTION EPIDURAL; INFILTRATION; INTRACAUDAL; PERINEURAL at 07:11

## 2023-11-02 RX ADMIN — SUGAMMADEX 200 MG: 100 INJECTION, SOLUTION INTRAVENOUS at 02:11

## 2023-11-02 RX ADMIN — FENTANYL CITRATE 25 MCG: 50 INJECTION INTRAMUSCULAR; INTRAVENOUS at 10:11

## 2023-11-02 RX ADMIN — LIDOCAINE HYDROCHLORIDE 2 MG/MIN: 8 INJECTION, SOLUTION INTRAVENOUS at 07:11

## 2023-11-02 RX ADMIN — DEXMEDETOMIDINE HYDROCHLORIDE 1.4 MCG/KG/HR: 4 INJECTION INTRAVENOUS at 02:11

## 2023-11-02 RX ADMIN — AMINOCAPROIC ACID 5 G: 250 INJECTION, SOLUTION INTRAVENOUS at 08:11

## 2023-11-02 RX ADMIN — PHENYLEPHRINE HYDROCHLORIDE 200 MCG: 10 INJECTION INTRAVENOUS at 09:11

## 2023-11-02 RX ADMIN — OXYCODONE HYDROCHLORIDE 10 MG: 5 TABLET ORAL at 07:11

## 2023-11-02 RX ADMIN — FENTANYL CITRATE 50 MCG: 50 INJECTION, SOLUTION INTRAMUSCULAR; INTRAVENOUS at 08:11

## 2023-11-02 RX ADMIN — ROCURONIUM BROMIDE 100 MG: 10 SOLUTION INTRAVENOUS at 07:11

## 2023-11-02 RX ADMIN — Medication 40 MG: at 12:11

## 2023-11-02 RX ADMIN — Medication 50 MG: at 07:11

## 2023-11-02 RX ADMIN — SODIUM CHLORIDE 1 UNITS/HR: 9 INJECTION, SOLUTION INTRAVENOUS at 03:11

## 2023-11-02 RX ADMIN — ALBUMIN (HUMAN) 25 G: 2.5 SOLUTION INTRAVENOUS at 03:11

## 2023-11-02 RX ADMIN — AMIODARONE HYDROCHLORIDE 200 MG: 200 TABLET ORAL at 06:11

## 2023-11-02 RX ADMIN — PHENYLEPHRINE HYDROCHLORIDE 200 MCG: 10 INJECTION INTRAVENOUS at 12:11

## 2023-11-02 RX ADMIN — EPINEPHRINE 0.06 MCG/KG/MIN: 1 INJECTION INTRAMUSCULAR; INTRAVENOUS; SUBCUTANEOUS at 02:11

## 2023-11-02 RX ADMIN — ACETAMINOPHEN 1000 MG: 10 INJECTION, SOLUTION INTRAVENOUS at 02:11

## 2023-11-02 RX ADMIN — ACETAMINOPHEN 1000 MG: 10 INJECTION, SOLUTION INTRAVENOUS at 11:11

## 2023-11-02 RX ADMIN — FENTANYL CITRATE 25 MCG: 50 INJECTION INTRAMUSCULAR; INTRAVENOUS at 08:11

## 2023-11-02 RX ADMIN — ALBUMIN (HUMAN) 250 ML: 2.5 SOLUTION INTRAVENOUS at 01:11

## 2023-11-02 RX ADMIN — Medication 100 MG: at 07:11

## 2023-11-02 RX ADMIN — SODIUM CHLORIDE 1500 MG: 9 INJECTION, SOLUTION INTRAVENOUS at 07:11

## 2023-11-02 NOTE — TRANSFER OF CARE
"Anesthesia Transfer of Care Note    Patient: Carlin Isaac    Procedure(s) Performed: Procedure(s) (LRB):  CORONARY ARTERY BYPASS GRAFT (CABG) (N/A)  ECHOCARDIOGRAM,TRANSESOPHAGEAL (N/A)  SURGICAL PROCUREMENT, VEIN, ENDOSCOPIC (Left)  BLOCK, NERVE, INTERCOSTAL, 2 OR MORE (N/A)    Patient location: ICU    Anesthesia Type: general    Transport from OR: Transported from OR intubated on 100% O2  with adequate ventilation controlled by transport ventilator    Post pain: adequate analgesia    Post assessment: no apparent anesthetic complications    Post vital signs: stable    Level of consciousness: sedated    Nausea/Vomiting: no nausea/vomiting    Complications: none    Transfer of care protocol was followed      Last vitals:   Visit Vitals  BP (!) 154/86   Pulse 86   Temp 37.4 °C (99.3 °F) (Core Bladder)   Resp 18   Ht 6' 3" (1.905 m)   Wt 99.1 kg (218 lb 7.6 oz)   SpO2 100%   BMI 27.31 kg/m²     "

## 2023-11-02 NOTE — INTERVAL H&P NOTE
The patient has been examined and the H&P has been reviewed:    I concur with the findings and no changes have occurred since H&P was written.    Surgery risks, benefits and alternative options discussed and understood by patient/family.          Active Hospital Problems    Diagnosis  POA    *Cardiac arrest [I46.9]  Yes    Coronary artery disease involving native coronary artery of native heart [I25.10]  Yes    KAREN (acute kidney injury) [N17.9]  Yes    Primary hypertension [I10]  Yes    Hyperlipidemia [E78.5]  Yes    Hypogonadism in male [E29.1]  Yes    Ulcerative proctitis [K51.20]  Yes      Resolved Hospital Problems    Diagnosis Date Resolved POA    Non-traumatic rhabdomyolysis [M62.82] 11/01/2023 Yes    Acute hypercapnic respiratory failure [J96.02] 11/01/2023 Yes

## 2023-11-02 NOTE — PROGRESS NOTES
Bedside Spirometry is contraindicated at this time due to pt not being able to sit up on side on the bed.

## 2023-11-02 NOTE — CARE UPDATE
Pt admitted to ICU bed 1 from OR. ETT secure and pt placed on 980 vent on documented settings. See flowsheet for details.

## 2023-11-02 NOTE — ASSESSMENT & PLAN NOTE
presumed VT/VF arrest given 3 shocks, 3 epis, and amio bolus in the field  CT head on admit without acute findings  S/p TTM and extubated 10/30  Developed CP overnight 10/30 with trop > 50  LHC 10/31 with multi-vessel disease, impella placed   Now s/p CABG

## 2023-11-02 NOTE — OP NOTE
[facility] ([facility])  Cardiothoracic Surgery  Operative Note    SUMMARY     Date of Procedure: 11/2/2023     Procedure: Procedure(s) (LRB):  CORONARY ARTERY BYPASS GRAFT (CABG) (N/A)  ECHOCARDIOGRAM,TRANSESOPHAGEAL (N/A)  SURGICAL PROCUREMENT, VEIN, ENDOSCOPIC (Left)  BLOCK, NERVE, INTERCOSTAL, 2 OR MORE (N/A)      Coronary artery bypass grafting x3 with LIMA to LAD, reverse saphenous vein graft to acute marginal and reverse saphenous graft to distal OM branch.    Surgeon(s) and Role:     * Oscar Cazares MD - Primary    Assisting Surgeon: None    Pre-Operative Diagnosis: Cardiac arrest [I46.9]  Coronary artery disease involving native coronary artery of native heart with unstable angina pectoris [I25.110]  Left main coronary artery disease [I25.10]    Post-Operative Diagnosis: Post-Op Diagnosis Codes:     * Cardiac arrest [I46.9]     * Coronary artery disease involving native coronary artery of native heart with unstable angina pectoris [I25.110]     * Left main coronary artery disease [I25.10]    Anesthesia: General    Operative Findings (including complications, if any):  Intraop REANNA shows patient has ejection fraction of 50-55% preop and postop.  An epiaortic ultrasound performed intra operative shows the aorta was free of intraluminal atherosclerotic plaques.  The patient has greater saphenous vein that was about 4.5 mm in diameter.  There were no varicosities.  An intraop indocyanine green angiogram shows the distal anastomosis were open with good perfusion of the myocardium.    Description of Technical Procedures:  The patient was prepped and draped sterilely.  A parasternal block was then performed by injecting Exparel into the intercostal spaces bilaterally.  A median sternotomy incision was then performed which was carried down sharply to the sternum was encountered.  The sternum was then opened with a sternal saw.  The left half of the sternum was then elevated with a mammary retractor.   The left internal mammary artery was then dissected down as a pedicle.  While this was being performed a 2nd member of the team was harvesting the greater saphenous vein from the patient's left leg.  The patient was then given heparin.  The patient's pericardium was then opened.  Pericardial sutures were then placed.  An epiaortic ultrasound was then performed which showed that there were no intraluminal atherosclerotic plaque.  The Impella cava was identified in the mid ascending aorta.  An aortic cannula pursestring was then placed in the distal ascending aorta.  A venous pursestring was placed in the right atrial appendage.  And a retrograde cardioplegia pursestring was placed in the free wall of the right atrium.  An antegrade cardioplegia cannula pursestring was placed in the mid ascending aorta.  Therapeutic ACT was then ascertained.  The patient was then cannulated with a 24 Tajik aortic cannula.  A 3 stage venous cannula was placed in the IVC.  And the retrograde cardioplegia cannula was placed in the coronary sinus.  An antegrade cardioplegia cannula was placed in the mid ascending aorta.  The patient was then started on cardiopulmonary bypass.  The aorta was then clamped with a soft Impella compatible crossclamp.  The Impella device was then on P0, surgical mode.  The heart was then arrested with antegrade warm cardioplegia throughout the procedure the patient's heart was perfused continuously with retrograde cold blood.  This was intermittently supplemented with antegrade cold blood cardioplegia.      Attention was 1st drawn to the acute marginal.  The acute marginal was identified and dissected.  The acute marginal arteriotomy was then performed.  The acute margin was assessed to be about 1.25-1.5 mm in diameter.  The distal anastomosis of the reverse saphenous vein graft was then performed to the acute marginal as an end-to-side anastomosis using 7 0 continuous Prolene sutures.  An indocyanine green  angiogram was then performed which showed the anastomosis was open with good perfusion of the myocardium.  Next attention was drawn to the aorta with 4.4 punch aortotomy was performed.  The proximal anastomosis of the reverse saphenous vein graft was then performed to the aorta using 6 0 continuous Prolene sutures.  Next attention was drawn to the large distal OM branch.  This was dissected down and the distal OM arteriotomy was performed.  The OM was found to be about 2.25 mm in diameter.  The distal anastomosis of the reverse saphenous vein graft was then performed to the distal OM as an end-to-side anastomosis using 7 0 continuous Prolene sutures.  An indocyanine green angiogram was then performed which showed the anastomosis was open with good perfusion of the entire lateral to inferior wall of the left ventricle.  Next attention was drawn to the aorta with 4.4 punch aortotomy was performed.  The proximal anastomosis of the reverse saphenous vein graft was then performed to the aorta using 6 0 continuous Prolene sutures.  Both proximal anastomosis were marked with a proximal anastomosis marker.  Next attention was drawn to the LAD territory.  The LAD was identified.  The LAD was then dissected an LAD arteriotomy was then performed.  The LAD was then opened in the mid LAD segment.  The LIMA was then anastomosed to the LAD as an end-to-side anastomosis using 8 0 continuous Prolene sutures.  An indocyanine green angiogram was then performed which showed the LIMA to LAD anastomosis was open and well perfused.  By this time the patient had been rewarming.  The patient was given hotshot cardioplegia.  The aortic crossclamp was then removed.  Atrial and ventricular pacemaker wires were then placed.  Two mediastinal and bilateral pleural chest tubes were placed.  Ventilation was restarted.  The patient was then weaned from cardiopulmonary bypass with the Impella output increased to P4.  Surgical sites were then  inspected and were free of bleeding.  The patient was then decannulated.  Heparin was reversed with protamine.  Surgical sites were again inspected and were free of bleeding.  The patient's sternum was then closed with figure-of-eight sternal wires.  The fascia was closed with 1. Vicryl sutures.  And skin was closed with 4 0 Monocryl sutures.    Significant Surgical Tasks Conducted by the Assistant(s), if Applicable:  Endoscopic vein harvesting    Estimated Blood Loss (EBL): * No values recorded between 11/2/2023  8:45 AM and 11/2/2023  1:27 PM *           Implants:   Implant Name Type Inv. Item Serial No.  Lot No. LRB No. Used Action   INDIRA/WIRE TEMPORARY CARDIAC PACING WIRE   N/A  [id number]  4 Implanted   ERICH RADIOMARK GRAFT MARKER   N/A  [id number]  2 Implanted       Specimens:   Specimen (24h ago, onward)      None                    Condition: Stable    Disposition: ICU - intubated and hemodynamically stable.    Attestation: I performed the procedure.

## 2023-11-02 NOTE — PROGRESS NOTES
[facility] - Int[facility] Care ([facility])  Critical Care Medicine  Progress Note    Patient Name: Carlin Isaac  MRN: [id number]  Admission Date: 10/28/2023  Hospital Length of Stay: 5 days  Code Status: Full Code  Attending Provider: Oscar Cazares MD  Primary Care Provider: Eliot Ambriz MD   Principal Problem: Cardiac arrest    Subjective:     HPI:  Mr Isaac is a 67 y/o man with HTN, hypogonadism on chonic testosterone, Chrohn's dz/Ulcerative proctitis, CAD s/p stent to the proximal/mid LAD in 2007, and HLD who presents to the ER today after an out of hospital cardiac arrest.  History taken from the medical record and discussion with staff as patient is unable to provide history and no family at bedside during my exam.  Reportedly, he was working out at [employer] when he suddenly collapsed.  EMS administered 3 shocks, 3 epis, and amio bolus en route.  Presumed rythm was VF.    Of note, he had angina in Sept 2022 and [facility] with multi-vessel disease.  He was recommended for CABG, but he declined at the time as his angina had resolved.    In the ER, lab work notable for INR 1.3, Cr 1.9, mildly elevated LFTs,  with negative initial troponin.  Echo fairly unremarkable.  He is intubated and sedated.  TTM in place and will change goal to 36.  Currently on Levo for vasopressor support through CVL placed by ER.      Hospital/ICU Course:  10/30: HR and BP labile overnight with tachy-manuel rhythm requiring adjustment of drips. Levophed, Amiodarone, fentanyl and propofol infusing. Completed TTM yesterday afternoon  11/1: no acute events overnight. Off levophed and Nitro. Remains on amiodarone infusion. [facility] yesterday with impella placed. Plans for CABG with Dr. Cazares tomorrow  11/2: underwent CABG x3 today with Dr. Cazares- returned to ICU intubated, CT x 4, on precedex and Epi at 0.08, and impella in place        Objective:     Vital Signs (Most Recent):  Temp: 100.2 °F (37.9 °C) (11/02/23 1430)  Pulse: 91  (11/02/23 1430)  Resp: (!) 24 (11/02/23 1430)  BP: (!) 118/58 (11/02/23 1430)  SpO2: 100 % (11/02/23 1430) Vital Signs (24h Range):  Temp:  [99.1 °F (37.3 °C)-100.8 °F (38.2 °C)] 100.2 °F (37.9 °C)  Pulse:  [67-97] 91  Resp:  [15-31] 24  SpO2:  [92 %-100 %] 100 %  BP: (113-166)/(58-86) 118/58  Arterial Line BP: ()/(50-82) 104/64     Weight: 99.1 kg (218 lb 7.6 oz)  Body mass index is 27.31 kg/m².      Intake/Output Summary (Last 24 hours) at 11/2/2023 1448  Last data filed at 11/2/2023 1414  Gross per 24 hour   Intake 3294.26 ml   Output 2765 ml   Net 529.26 ml        Physical Exam  Vitals reviewed.   Constitutional:       Appearance: He is ill-appearing.      Interventions: He is sedated, intubated and restrained.   HENT:      Head: Normocephalic and atraumatic.      Mouth/Throat:      Mouth: Mucous membranes are moist.      Pharynx: Oropharynx is clear.   Eyes:      Extraocular Movements: Extraocular movements intact.      Conjunctiva/sclera: Conjunctivae normal.   Cardiovascular:      Rate and Rhythm: Normal rate and regular rhythm.      Pulses: Normal pulses.      Comments: Vac in place to midsternal incision. CT x 4 (2 mediastinal an 2 pleural). Impella in place to right groin  Pulmonary:      Effort: He is intubated.      Comments: Synchronous with vent. Clear bilaterally   Abdominal:      General: There is no distension.      Palpations: Abdomen is soft.   Genitourinary:     Comments: Indwelling urinary catheter- clear yellow urine  Musculoskeletal:      Cervical back: Neck supple.      Comments: Right leg brace in place. ACE wrap to LLE with JOHN x 1   Skin:     General: Skin is warm and dry.   Neurological:      Comments: Sedated/intubated- unable to assess           Review of Systems   Unable to perform ROS: Intubated       Vents:  Vent Mode: A/C (11/02/23 1420)  Ventilator Initiated: Yes (11/02/23 1404)  Set Rate: 20 BPM (11/02/23 1420)  Vt Set: 500 mL (11/02/23 1420)  Pressure Support: 7 cmH20  (11/02/23 1404)  PEEP/CPAP: 5 cmH20 (11/02/23 1420)  Oxygen Concentration (%): 80 (11/02/23 1430)  Peak Airway Pressure: 40 cmH20 (11/02/23 1420)  Plateau Pressure: 0 cmH20 (11/02/23 1420)  Total Ve: 13.9 L/m (11/02/23 1420)  Negative Inspiratory Force (cm H2O): 0 (11/02/23 1420)  F/VT Ratio<105 (RSBI): 129.35 (11/02/23 1420)    Lines/Drains/Airways       Central Venous Catheter Line  Duration             Percutaneous Central Line Insertion/Assessment - Triple Lumen  10/28/23 0950 Internal Jugular Right 5 days    Pulmonary Artery Catheter Assessment  11/02/23 0756 <1 day              Drain  Duration                  Urethral Catheter 10/28/23 1318 Temperature probe 5 days         Closed/Suction Drain 11/02/23 1049 Tube - 1 Left Leg Bulb 19 Fr. <1 day         Y Chest Tube 1 and 2 11/02/23 1054 1 Right Mediastinal 24 Fr. 2 Left Mediastinal 24 Fr. <1 day         Y Chest Tube 3 and 4 11/02/23 1100 3 Left Pleural 24 Fr. 4 Right Pleural 24 Fr. <1 day              Airway  Duration                  Airway - Non-Surgical 11/02/23 0727 <1 day              Arterial Line  Duration             Arterial Line 10/28/23 1830 Left Femoral 4 days    Arterial Line 11/02/23 0719 Left Radial <1 day              Line  Duration                  VAD 10/31/23 1025 Left ventricular assist device Impella 2 days                    Significant Labs:    CBC/Anemia Profile:  Recent Labs   Lab 11/02/23  0412 11/02/23  0831 11/02/23  1314 11/02/23  1324 11/02/23  1414 11/02/23  1418   WBC 9.02  --  21.00*  --   --  22.89*   HGB 13.5*  --  11.3*  --   --  11.2*   HCT 40.2   < > 34.6* 32* 32* 34.7*   *  --  147*  --   --  146*   MCV 88  --  92  --   --  91   RDW 13.7  --  13.9  --   --  13.9    < > = values in this interval not displayed.        Chemistries:  Recent Labs   Lab 11/01/23  0456 11/02/23  0412    142   K 3.7 4.0    108   CO2 26 25   BUN 12 13   CREATININE 1.0 0.9   CALCIUM 7.9* 8.6*   ALBUMIN 2.5* 2.5*   PROT 5.2* 5.1*    BILITOT 0.7 0.8   ALKPHOS 40* 44*   * 102*   * 94*   MG 2.0 1.8   PHOS 1.5* 2.4*       All pertinent labs within the past 24 hours have been reviewed.    Significant Imaging:  I have reviewed all pertinent imaging results/findings within the past 24 hours.      ABG  Recent Labs   Lab 11/02/23  1414   PH 7.343*   PO2 162*   PCO2 44.5   HCO3 24.2   BE -2     Assessment/Plan:     Cardiac/Vascular  * Cardiac arrest  presumed VT/VF arrest given 3 shocks, 3 epis, and amio bolus in the field  CT head on admit without acute findings  S/p TTM and extubated 10/30  Developed CP overnight 10/30 with trop > 50  LHC 10/31 with multi-vessel disease, impella placed   Now s/p CABG        Hyperlipidemia  Holding statin acutely  Can restart when LFTs normal     Primary hypertension  Post-op on low dose Epi      Coronary artery disease involving native coronary artery of native heart  h/o PCI in 2007  LHC in 2022 with multi-vessel disease and pt declined CABG unless CP returned  troponin peaked at 6.192 prior to downtrending; CP returned 10/30 overnight with trop > 50  LHC 10/31 with multi-vessel disease  11/2- CABG x 3 with Dr. Cazares  Vent weaning to extubation per protocol  Insulin infusion if needed  Monitor CT output, JOHN drain care, pain management   impella in place  Plan per CTS      Endocrine  Hypogonadism in male  Holding testosterone replacement    GI  Ulcerative proctitis  Doesn't appear he takes anything regularly at home (mention of PRN sulfasalazine in some clinic notes)  Supportive Care       Sandra Dobson NP  Critical Care Medicine  [facility] ([facility])

## 2023-11-02 NOTE — SUBJECTIVE & OBJECTIVE
Objective:     Vital Signs (Most Recent):  Temp: 100.2 °F (37.9 °C) (11/02/23 1430)  Pulse: 91 (11/02/23 1430)  Resp: (!) 24 (11/02/23 1430)  BP: (!) 118/58 (11/02/23 1430)  SpO2: 100 % (11/02/23 1430) Vital Signs (24h Range):  Temp:  [99.1 °F (37.3 °C)-100.8 °F (38.2 °C)] 100.2 °F (37.9 °C)  Pulse:  [67-97] 91  Resp:  [15-31] 24  SpO2:  [92 %-100 %] 100 %  BP: (113-166)/(58-86) 118/58  Arterial Line BP: ()/(50-82) 104/64     Weight: 99.1 kg (218 lb 7.6 oz)  Body mass index is 27.31 kg/m².      Intake/Output Summary (Last 24 hours) at 11/2/2023 1448  Last data filed at 11/2/2023 1414  Gross per 24 hour   Intake 3294.26 ml   Output 2765 ml   Net 529.26 ml        Physical Exam  Vitals reviewed.   Constitutional:       Appearance: He is ill-appearing.      Interventions: He is sedated, intubated and restrained.   HENT:      Head: Normocephalic and atraumatic.      Mouth/Throat:      Mouth: Mucous membranes are moist.      Pharynx: Oropharynx is clear.   Eyes:      Extraocular Movements: Extraocular movements intact.      Conjunctiva/sclera: Conjunctivae normal.   Cardiovascular:      Rate and Rhythm: Normal rate and regular rhythm.      Pulses: Normal pulses.      Comments: Vac in place to midsternal incision. CT x 4 (2 mediastinal an 2 pleural). Impella in place to right groin  Pulmonary:      Effort: He is intubated.      Comments: Synchronous with vent. Clear bilaterally   Abdominal:      General: There is no distension.      Palpations: Abdomen is soft.   Genitourinary:     Comments: Indwelling urinary catheter- clear yellow urine  Musculoskeletal:      Cervical back: Neck supple.      Comments: Right leg brace in place. ACE wrap to LLE with JOHN x 1   Skin:     General: Skin is warm and dry.   Neurological:      Comments: Sedated/intubated- unable to assess           Review of Systems   Unable to perform ROS: Intubated       Vents:  Vent Mode: A/C (11/02/23 1420)  Ventilator Initiated: Yes (11/02/23  1404)  Set Rate: 20 BPM (11/02/23 1420)  Vt Set: 500 mL (11/02/23 1420)  Pressure Support: 7 cmH20 (11/02/23 1404)  PEEP/CPAP: 5 cmH20 (11/02/23 1420)  Oxygen Concentration (%): 80 (11/02/23 1430)  Peak Airway Pressure: 40 cmH20 (11/02/23 1420)  Plateau Pressure: 0 cmH20 (11/02/23 1420)  Total Ve: 13.9 L/m (11/02/23 1420)  Negative Inspiratory Force (cm H2O): 0 (11/02/23 1420)  F/VT Ratio<105 (RSBI): 129.35 (11/02/23 1420)    Lines/Drains/Airways       Central Venous Catheter Line  Duration             Percutaneous Central Line Insertion/Assessment - Triple Lumen  10/28/23 0950 Internal Jugular Right 5 days    Pulmonary Artery Catheter Assessment  11/02/23 0756 <1 day              Drain  Duration                  Urethral Catheter 10/28/23 1318 Temperature probe 5 days         Closed/Suction Drain 11/02/23 1049 Tube - 1 Left Leg Bulb 19 Fr. <1 day         Y Chest Tube 1 and 2 11/02/23 1054 1 Right Mediastinal 24 Fr. 2 Left Mediastinal 24 Fr. <1 day         Y Chest Tube 3 and 4 11/02/23 1100 3 Left Pleural 24 Fr. 4 Right Pleural 24 Fr. <1 day              Airway  Duration                  Airway - Non-Surgical 11/02/23 0727 <1 day              Arterial Line  Duration             Arterial Line 10/28/23 1830 Left Femoral 4 days    Arterial Line 11/02/23 0719 Left Radial <1 day              Line  Duration                  VAD 10/31/23 1025 Left ventricular assist device Impella 2 days                    Significant Labs:    CBC/Anemia Profile:  Recent Labs   Lab 11/02/23  0412 11/02/23  0831 11/02/23  1314 11/02/23  1324 11/02/23  1414 11/02/23  1418   WBC 9.02  --  21.00*  --   --  22.89*   HGB 13.5*  --  11.3*  --   --  11.2*   HCT 40.2   < > 34.6* 32* 32* 34.7*   *  --  147*  --   --  146*   MCV 88  --  92  --   --  91   RDW 13.7  --  13.9  --   --  13.9    < > = values in this interval not displayed.        Chemistries:  Recent Labs   Lab 11/01/23  0456 11/02/23  0412    142   K 3.7 4.0    108    CO2 26 25   BUN 12 13   CREATININE 1.0 0.9   CALCIUM 7.9* 8.6*   ALBUMIN 2.5* 2.5*   PROT 5.2* 5.1*   BILITOT 0.7 0.8   ALKPHOS 40* 44*   * 102*   * 94*   MG 2.0 1.8   PHOS 1.5* 2.4*       All pertinent labs within the past 24 hours have been reviewed.    Significant Imaging:  I have reviewed all pertinent imaging results/findings within the past 24 hours.

## 2023-11-02 NOTE — ANESTHESIA PROCEDURE NOTES
[place] [facility]    Diagnosis: MR  Patient location during procedure: done in OR  Procedure start time: 11/2/2023 7:56 AM  Timeout: 11/2/2023 7:55 AM  Procedure end time: 11/2/2023 8:10 AM    Staffing  Authorizing Provider: Eitan Dale MD  Performing Provider: Eitan Dale MD    Staffing  Performed by: Eitan Dale MD  Authorized by: Eitan Dale MD    Anesthesiologist was present at the time of the procedure.  Preanesthetic Checklist  Completed: patient identified, IV checked, site marked, risks and benefits discussed, surgical consent, monitors and equipment checked, pre-op evaluation, timeout performed and anesthesia consent given  [place] [facility]  Skin Prep: chlorhexidine gluconate  Local Infiltration: none  Location: right,  internal jugular vein  Vessel Caliber: large, patent, compressibility normal  Vascular Doppler:  not done  Introducer: 9 Fr single lumen,   Device: standard thermodilution catheter   Catheter Size: 8 Fr  Catheter placement by yes (exchange over guidewire). Heme positive aspiration all ports. PAC floated with balloon up not wedgedSterile sheath used  Locked at: 49 cm.Insertion Attempts: 1  Indication: intravenous therapy, hemodynamic monitoring  Ultrasound Guidance  Guidewire confirmed in vessel.  Sterile sheath used.  Assessment  Central Line Bundle Protocol followed. Hand hygiene before procedure, surgical cap worn, surgical mask worn, sterile surgical gloves worn, large sterile drape used.  Verification: ultrasound  Dressing: sutured in place and taped  Patient: Tolerated Well  Additional Notes  Patient arrived to OR with existing double-lumen RIJ CVC - removed via sterile guidewire and replaced with Cordis under sterile conditions

## 2023-11-02 NOTE — ANESTHESIA PROCEDURE NOTES
Arterial    Diagnosis: CAD    Patient location during procedure: done in OR  Procedure start time: 11/2/2023 7:19 AM  Timeout: 11/2/2023 7:19 AM  Procedure end time: 11/2/2023 7:19 AM    Staffing  Authorizing Provider: Eitan Dale MD  Performing Provider: Eitan Dale MD    Staffing  Performed by: Eitan Dale MD  Authorized by: Eitan Dale MD    Anesthesiologist was present at the time of the procedure.    Preanesthetic Checklist  Completed: patient identified, IV checked, site marked, risks and benefits discussed, surgical consent, monitors and equipment checked, pre-op evaluation, timeout performed and anesthesia consent givenArterial  Skin Prep: chlorhexidine gluconate  Local Infiltration: none  Orientation: left  Location: radial    Catheter Size: 20 G  Catheter placement by Anatomical landmarks. Heme positive aspiration all ports. Insertion Attempts: 1  Assessment  Dressing: secured with tape and tegaderm and sutured in place and taped  Patient: Tolerated well  Additional Notes  Placed by JOESPH Roy with anesthesiologist present

## 2023-11-02 NOTE — ANESTHESIA PROCEDURE NOTES
Intubation    Date/Time: 11/2/2023 7:27 AM    Performed by: Ada Andino CRNA  Authorized by: Ada Andino CRNA    Intubation:     Induction:  Intravenous    Intubated:  Postinduction    Mask Ventilation:  Easy mask    Attempts:  1    Attempted By:  CRNA and student    Method of Intubation:  Direct    Blade:  Johana 3    Laryngeal View Grade: Grade I - full view of cords      Difficult Airway Encountered?: No      Complications:  None    Airway Device:  Oral endotracheal tube    Airway Device Size:  8.0    Style/Cuff Inflation:  Cuffed (inflated to minimal occlusive pressure)    Tube secured:  23    Secured at:  The lips    Placement Verified By:  Capnometry    Complicating Factors:  None    Findings Post-Intubation:  BS equal bilateral and atraumatic/condition of teeth unchanged

## 2023-11-02 NOTE — PROGRESS NOTES
[facility] - Intensive Care ([facility])  Cardiology  Progress Note    Patient Name: Carlin Isaac  MRN: [id number]  Admission Date: 10/28/2023  Hospital Length of Stay: 5 days  Code Status: Full Code   Attending Physician: Oscar Cazares MD   Primary Care Physician: Eliot Ambriz MD  Expected Discharge Date:   Principal Problem:Cardiac arrest    Subjective:     Hospital Course:   10/29/2023: The patient is supported intubated stable.  Cardiac echo shows normal LV function troponin levels are elevated EKG shows ST segments have reverted back to normal sinus rhythm with normal intervals and no evidence of further ST changes.  Putting the wife the patient has significant coronary disease and had refused bypass surgery last year.  Will plan another heart catheterization after he is wakeful and extubated.  Clinically stable this time with supportive care.    10/30/23   Intubated, NSR, some nonsustained tachyrhythmia overnight.Pt on Levo, Amio,.     10/31/23 CP last night requiring tridil, troponin > 50, no CP on exam. Discussed cath with pt and agrees. Will schedule with Dr. Mathew    11/1/23   No CP, CABG tomorrow, impella in place, no CP    11/2/23  s/p 3V CABG, on post op vasopressors, tele NSR      Interval History:     Review of Systems   Constitutional: Negative. Negative for weight gain.   HENT: Negative.     Eyes: Negative.    Cardiovascular: Negative.  Negative for chest pain, leg swelling and palpitations.   Respiratory: Negative.  Negative for shortness of breath.    Endocrine: Negative.    Hematologic/Lymphatic: Negative.    Skin: Negative.    Musculoskeletal:  Negative for muscle weakness.   Gastrointestinal: Negative.    Genitourinary: Negative.    Neurological: Negative.  Negative for dizziness.   Psychiatric/Behavioral: Negative.     Allergic/Immunologic: Negative.    All other systems reviewed and are negative.    Objective:     Vital Signs (Most Recent):  Temp: 100.2 °F (37.9 °C) (11/02/23  1430)  Pulse: 91 (11/02/23 1430)  Resp: (Abnormal) 24 (11/02/23 1430)  BP: (Abnormal) 118/58 (11/02/23 1430)  SpO2: 100 % (11/02/23 1430) Vital Signs (24h Range):  Temp:  [99.1 °F (37.3 °C)-100.8 °F (38.2 °C)] 100.2 °F (37.9 °C)  Pulse:  [67-97] 91  Resp:  [15-31] 24  SpO2:  [92 %-100 %] 100 %  BP: (113-166)/(58-86) 118/58  Arterial Line BP: ()/(50-82) 104/64     Weight: 99.1 kg (218 lb 7.6 oz)  Body mass index is 27.31 kg/m².     SpO2: 100 %         Intake/Output Summary (Last 24 hours) at 11/2/2023 1447  Last data filed at 11/2/2023 1414  Gross per 24 hour   Intake 3294.26 ml   Output 2765 ml   Net 529.26 ml       Lines/Drains/Airways       Central Venous Catheter Line       Name Duration    Percutaneous Central Line Insertion/Assessment - Triple Lumen  10/28/23 0950 Internal Jugular Right 5 days    Pulmonary Artery Catheter Assessment  11/02/23 0756 <1 day              Drain       Name Duration         Urethral Catheter 10/28/23 1318 Temperature probe 5 days         Closed/Suction Drain 11/02/23 1049 Tube - 1 Left Leg Bulb 19 Fr. <1 day         Y Chest Tube 1 and 2 11/02/23 1054 1 Right Mediastinal 24 Fr. 2 Left Mediastinal 24 Fr. <1 day         Y Chest Tube 3 and 4 11/02/23 1100 3 Left Pleural 24 Fr. 4 Right Pleural 24 Fr. <1 day              Airway       Name Duration         Airway - Non-Surgical 11/02/23 0727 <1 day              Arterial Line       Name Duration    Arterial Line 10/28/23 1830 Left Femoral 4 days    Arterial Line 11/02/23 0719 Left Radial <1 day              Line       Name Duration         VAD 10/31/23 1025 Left ventricular assist device Impella 2 days                       Physical Exam  Vitals and nursing note reviewed.   Constitutional:       Appearance: He is well-developed.   HENT:      Head: Normocephalic and atraumatic.   Eyes:      Conjunctiva/sclera: Conjunctivae normal.      Pupils: Pupils are equal, round, and reactive to light.   Cardiovascular:      Rate and Rhythm: Normal  rate and regular rhythm.      Pulses: Intact distal pulses.      Heart sounds: Normal heart sounds.   Pulmonary:      Effort: Pulmonary effort is normal.      Breath sounds: Normal breath sounds.   Abdominal:      General: Bowel sounds are normal.      Palpations: Abdomen is soft.   Musculoskeletal:      Cervical back: Normal range of motion and neck supple.   Skin:     General: Skin is warm and dry.   Neurological:      Mental Status: He is alert and oriented to person, place, and time.            Significant Labs: All pertinent lab results from the last 24 hours have been reviewed.    Significant Imaging: X-Ray: CXR: X-Ray Chest 1 View (CXR): No results found for this visit on 10/28/23.    Assessment and Plan:     Brief HPI:     * Cardiac arrest  Will continue supportive care  Continue amiodarone  Continue enoxaparin  Add BB if needed    Coronary artery disease involving native coronary artery of native heart  Significant CAD, EKG shows anterior and lateral ischemia  Plan nitrates if possible  1 st troponin negative and will trend  Likely cardiac event was VT  Cardiac echo shows  Preserved LV function        VTE Risk Mitigation (From admission, onward)         Ordered     IP VTE LOW RISK PATIENT  Once         11/01/23 1945     Place JUAN PABLO hose  Until discontinued         11/01/23 1945     heparin 25,000 units in dextrose 5% (100 units/ml) IV bolus from bag - ADDITIONAL PRN BOLUS - 30 units/kg (max bolus 4000 units)  As needed (PRN)        Question:  Heparin Infusion Adjustment (DO NOT MODIFY ANSWER)  Answer:  \\[facility]\Images\Pharmacy\HeparinInfusions\heparin LOW INTENSITY nomogram for [facility] GO374H.pdf    10/31/23 0001     heparin 25,000 units in dextrose 5% (100 units/ml) IV bolus from bag - ADDITIONAL PRN BOLUS - 60 units/kg (max bolus 4000 units)  As needed (PRN)        Question:  Heparin Infusion Adjustment (DO NOT MODIFY ANSWER)  Answer:  \\[facility]\Images\Pharmacy\HeparinInfusions\heparin LOW  INTENSITY nomogram for [facility] [id number].pdf    10/30/23 2212     heparin 25,000 units in dextrose 5% 250 mL (100 units/mL) infusion LOW INTENSITY nomogram - [facility]  Continuous        Question:  Begin at (units/kg/hr)  Answer:  12    10/30/23 2212     Place sequential compression device  Until discontinued         10/28/23 6502                Jared Nieto MD  Cardiology  [facility] - Intensive Care ([facility])

## 2023-11-02 NOTE — ASSESSMENT & PLAN NOTE
h/o PCI in 2007  [facility] in 2022 with multi-vessel disease and pt declined CABG unless CP returned  troponin peaked at 6.192 prior to downtrending; CP returned 10/30 overnight with trop > 50  [facility] 10/31 with multi-vessel disease  11/2- CABG x 3 with Dr. Cazares  Vent weaning to extubation per protocol  Insulin infusion if needed  Monitor CT output, JOHN drain care, pain management   impella in place  Plan per CTS

## 2023-11-02 NOTE — NURSING
Healthcare POA filled out and signed by pt with Beba Mann NP and this nurse. Placed in chart. Girlfriend, Demi Sahni, is to be surrogate decision maker per paperwork.

## 2023-11-02 NOTE — SUBJECTIVE & OBJECTIVE
Interval History:     Review of Systems   Constitutional: Negative. Negative for weight gain.   HENT: Negative.     Eyes: Negative.    Cardiovascular: Negative.  Negative for chest pain, leg swelling and palpitations.   Respiratory: Negative.  Negative for shortness of breath.    Endocrine: Negative.    Hematologic/Lymphatic: Negative.    Skin: Negative.    Musculoskeletal:  Negative for muscle weakness.   Gastrointestinal: Negative.    Genitourinary: Negative.    Neurological: Negative.  Negative for dizziness.   Psychiatric/Behavioral: Negative.     Allergic/Immunologic: Negative.    All other systems reviewed and are negative.    Objective:     Vital Signs (Most Recent):  Temp: 100.2 °F (37.9 °C) (11/02/23 1430)  Pulse: 91 (11/02/23 1430)  Resp: (Abnormal) 24 (11/02/23 1430)  BP: (Abnormal) 118/58 (11/02/23 1430)  SpO2: 100 % (11/02/23 1430) Vital Signs (24h Range):  Temp:  [99.1 °F (37.3 °C)-100.8 °F (38.2 °C)] 100.2 °F (37.9 °C)  Pulse:  [67-97] 91  Resp:  [15-31] 24  SpO2:  [92 %-100 %] 100 %  BP: (113-166)/(58-86) 118/58  Arterial Line BP: ()/(50-82) 104/64     Weight: 99.1 kg (218 lb 7.6 oz)  Body mass index is 27.31 kg/m².     SpO2: 100 %         Intake/Output Summary (Last 24 hours) at 11/2/2023 1447  Last data filed at 11/2/2023 1414  Gross per 24 hour   Intake 3294.26 ml   Output 2765 ml   Net 529.26 ml       Lines/Drains/Airways       Central Venous Catheter Line       Name Duration    Percutaneous Central Line Insertion/Assessment - Triple Lumen  10/28/23 0950 Internal Jugular Right 5 days    Pulmonary Artery Catheter Assessment  11/02/23 0756 <1 day              Drain       Name Duration         Urethral Catheter 10/28/23 1318 Temperature probe 5 days         Closed/Suction Drain 11/02/23 1049 Tube - 1 Left Leg Bulb 19 Fr. <1 day         Y Chest Tube 1 and 2 11/02/23 1054 1 Right Mediastinal 24 Fr. 2 Left Mediastinal 24 Fr. <1 day         Y Chest Tube 3 and 4 11/02/23 1100 3 Left Pleural 24 Fr.  4 Right Pleural 24 Fr. <1 day              Airway       Name Duration         Airway - Non-Surgical 11/02/23 0727 <1 day              Arterial Line       Name Duration    Arterial Line 10/28/23 1830 Left Femoral 4 days    Arterial Line 11/02/23 0719 Left Radial <1 day              Line       Name Duration         VAD 10/31/23 1025 Left ventricular assist device Impella 2 days                       Physical Exam  Vitals and nursing note reviewed.   Constitutional:       Appearance: He is well-developed.   HENT:      Head: Normocephalic and atraumatic.   Eyes:      Conjunctiva/sclera: Conjunctivae normal.      Pupils: Pupils are equal, round, and reactive to light.   Cardiovascular:      Rate and Rhythm: Normal rate and regular rhythm.      Pulses: Intact distal pulses.      Heart sounds: Normal heart sounds.   Pulmonary:      Effort: Pulmonary effort is normal.      Breath sounds: Normal breath sounds.   Abdominal:      General: Bowel sounds are normal.      Palpations: Abdomen is soft.   Musculoskeletal:      Cervical back: Normal range of motion and neck supple.   Skin:     General: Skin is warm and dry.   Neurological:      Mental Status: He is alert and oriented to person, place, and time.            Significant Labs: All pertinent lab results from the last 24 hours have been reviewed.    Significant Imaging: X-Ray: CXR: X-Ray Chest 1 View (CXR): No results found for this visit on 10/28/23.

## 2023-11-02 NOTE — PLAN OF CARE
Problem: Adult Inpatient Plan of Care  Goal: Plan of Care Review  Outcome: Ongoing, Progressing  Pt AAOx4, no complaints of pain or discomfort. Afebrile. NSR on monitor. BP stable. Central line infusing Tridil, Heparin gtt stopped at 4am per order. NPO past midnight for surgery this morning. Bath given, pt shaved, and sheets changed this morning. Chlorhexidine mouth wash and Nozin nasal  administered per order. 40-100ml/hr urine output via ayala cath. BM x1 this shift. R groin impella with no complications overnight. POC reviewed with pt and family at bedside, verbalized understanding. Will continue with current POC and update as needed.

## 2023-11-02 NOTE — ANESTHESIA PROCEDURE NOTES
Monitor REANNA    Diagnosis: CAD  Patient location during procedure: OR  Procedure start time: 11/2/2023 8:19 AM  Exam type: Monitor Only    Staffing  Authorizing Provider: Eitan Dale MD  Performing Provider: Eitan Dale MD    Staffing  Anesthesiologist Present  Yes  Setup & Induction  Patient preparation: bite block inserted  Probe Insertion: easy      Findings    Aortic valve without significant stenosis; No appreciable AI; impeller noted across AV  Mitral valve without significant stenosis; no appreciable MR    Unable to view TV secondary to difficult anatomy   No vegetations or pericardial effusions noted    EF approx 55%  Intervent septum thickness WNL and without defect  No obvious wall motion abnormalities   Images saved to disk   Impression    Other Findings    Probe Removal

## 2023-11-02 NOTE — PROGRESS NOTES
The patient is a 68-year-old male with known coronary artery disease who presented to the hospitalist status post cardiac arrest.  The patient had a cardiac catheterization done which shows severe multivessel coronary disease with significant left main stenosis.  The patient had an Impella left ventricular assist device placed in the cath lab.  The risks and benefits of coronary artery bypass grafting were explained to the patient.  The risks include but not limited to death, stroke, myocardial infarction, heart failure, bleeding, infection, renal failure, prolonged intubation and prolonged hospitalization.  The patient understand the risks and benefits of surgery and has agreed to proceed with urgent coronary artery bypass grafting.

## 2023-11-03 LAB
ALBUMIN SERPL BCP-MCNC: 2.7 G/DL (ref 3.5–5.2)
ALP SERPL-CCNC: 31 U/L (ref 55–135)
ALT SERPL W/O P-5'-P-CCNC: 56 U/L (ref 10–44)
ANION GAP SERPL CALC-SCNC: 8 MMOL/L (ref 8–16)
ANION GAP SERPL CALC-SCNC: 9 MMOL/L (ref 8–16)
APTT PPP: 28.5 SEC (ref 21–32)
AST SERPL-CCNC: 54 U/L (ref 10–40)
BILIRUB SERPL-MCNC: 0.7 MG/DL (ref 0.1–1)
BUN SERPL-MCNC: 20 MG/DL (ref 8–23)
BUN SERPL-MCNC: 22 MG/DL (ref 8–23)
CALCIUM SERPL-MCNC: 7.7 MG/DL (ref 8.7–10.5)
CALCIUM SERPL-MCNC: 7.9 MG/DL (ref 8.7–10.5)
CHLORIDE SERPL-SCNC: 104 MMOL/L (ref 95–110)
CHLORIDE SERPL-SCNC: 107 MMOL/L (ref 95–110)
CO2 SERPL-SCNC: 25 MMOL/L (ref 23–29)
CO2 SERPL-SCNC: 26 MMOL/L (ref 23–29)
CREAT SERPL-MCNC: 1 MG/DL (ref 0.5–1.4)
CREAT SERPL-MCNC: 1.1 MG/DL (ref 0.5–1.4)
ERYTHROCYTE [DISTWIDTH] IN BLOOD BY AUTOMATED COUNT: 13.6 % (ref 11.5–14.5)
EST. GFR  (NO RACE VARIABLE): >60 ML/MIN/1.73 M^2
EST. GFR  (NO RACE VARIABLE): >60 ML/MIN/1.73 M^2
GLUCOSE SERPL-MCNC: 133 MG/DL (ref 70–110)
GLUCOSE SERPL-MCNC: 154 MG/DL (ref 70–110)
HCT VFR BLD AUTO: 26.9 % (ref 40–54)
HGB BLD-MCNC: 9 G/DL (ref 14–18)
INR PPP: 1.5 (ref 0.8–1.2)
MAGNESIUM SERPL-MCNC: 1.9 MG/DL (ref 1.6–2.6)
MAGNESIUM SERPL-MCNC: 2.3 MG/DL (ref 1.6–2.6)
MCH RBC QN AUTO: 29.7 PG (ref 27–31)
MCHC RBC AUTO-ENTMCNC: 33.5 G/DL (ref 32–36)
MCV RBC AUTO: 89 FL (ref 82–98)
PHOSPHATE SERPL-MCNC: 3.5 MG/DL (ref 2.7–4.5)
PLATELET # BLD AUTO: 132 K/UL (ref 150–450)
PMV BLD AUTO: 10.2 FL (ref 9.2–12.9)
POC ACTIVATED CLOTTING TIME K: 275 SEC (ref 74–137)
POCT GLUCOSE: 112 MG/DL (ref 70–110)
POCT GLUCOSE: 119 MG/DL (ref 70–110)
POCT GLUCOSE: 120 MG/DL (ref 70–110)
POCT GLUCOSE: 131 MG/DL (ref 70–110)
POCT GLUCOSE: 142 MG/DL (ref 70–110)
POCT GLUCOSE: 147 MG/DL (ref 70–110)
POCT GLUCOSE: 148 MG/DL (ref 70–110)
POCT GLUCOSE: 150 MG/DL (ref 70–110)
POCT GLUCOSE: 153 MG/DL (ref 70–110)
POCT GLUCOSE: 160 MG/DL (ref 70–110)
POCT GLUCOSE: 169 MG/DL (ref 70–110)
POCT GLUCOSE: 191 MG/DL (ref 70–110)
POTASSIUM SERPL-SCNC: 4.1 MMOL/L (ref 3.5–5.1)
POTASSIUM SERPL-SCNC: 4.4 MMOL/L (ref 3.5–5.1)
PROT SERPL-MCNC: 4.5 G/DL (ref 6–8.4)
PROTHROMBIN TIME: 15.6 SEC (ref 9–12.5)
RBC # BLD AUTO: 3.03 M/UL (ref 4.6–6.2)
SAMPLE: ABNORMAL
SODIUM SERPL-SCNC: 138 MMOL/L (ref 136–145)
SODIUM SERPL-SCNC: 141 MMOL/L (ref 136–145)
WBC # BLD AUTO: 14.22 K/UL (ref 3.9–12.7)

## 2023-11-03 PROCEDURE — 84100 ASSAY OF PHOSPHORUS: CPT | Performed by: INTERNAL MEDICINE

## 2023-11-03 PROCEDURE — 63600175 PHARM REV CODE 636 W HCPCS: Performed by: THORACIC SURGERY (CARDIOTHORACIC VASCULAR SURGERY)

## 2023-11-03 PROCEDURE — 99232 SBSQ HOSP IP/OBS MODERATE 35: CPT | Mod: ,,, | Performed by: INTERNAL MEDICINE

## 2023-11-03 PROCEDURE — 85027 COMPLETE CBC AUTOMATED: CPT | Performed by: THORACIC SURGERY (CARDIOTHORACIC VASCULAR SURGERY)

## 2023-11-03 PROCEDURE — 85610 PROTHROMBIN TIME: CPT | Performed by: THORACIC SURGERY (CARDIOTHORACIC VASCULAR SURGERY)

## 2023-11-03 PROCEDURE — 25000242 PHARM REV CODE 250 ALT 637 W/ HCPCS: Performed by: THORACIC SURGERY (CARDIOTHORACIC VASCULAR SURGERY)

## 2023-11-03 PROCEDURE — P9045 ALBUMIN (HUMAN), 5%, 250 ML: HCPCS | Mod: JZ,JG | Performed by: THORACIC SURGERY (CARDIOTHORACIC VASCULAR SURGERY)

## 2023-11-03 PROCEDURE — 20000000 HC ICU ROOM

## 2023-11-03 PROCEDURE — 99232 PR SUBSEQUENT HOSPITAL CARE,LEVL II: ICD-10-PCS | Mod: ,,, | Performed by: INTERNAL MEDICINE

## 2023-11-03 PROCEDURE — C9113 INJ PANTOPRAZOLE SODIUM, VIA: HCPCS | Performed by: THORACIC SURGERY (CARDIOTHORACIC VASCULAR SURGERY)

## 2023-11-03 PROCEDURE — 99900035 HC TECH TIME PER 15 MIN (STAT)

## 2023-11-03 PROCEDURE — 94761 N-INVAS EAR/PLS OXIMETRY MLT: CPT

## 2023-11-03 PROCEDURE — 80048 BASIC METABOLIC PNL TOTAL CA: CPT | Mod: XB | Performed by: THORACIC SURGERY (CARDIOTHORACIC VASCULAR SURGERY)

## 2023-11-03 PROCEDURE — 94640 AIRWAY INHALATION TREATMENT: CPT

## 2023-11-03 PROCEDURE — 80053 COMPREHEN METABOLIC PANEL: CPT | Performed by: THORACIC SURGERY (CARDIOTHORACIC VASCULAR SURGERY)

## 2023-11-03 PROCEDURE — 83735 ASSAY OF MAGNESIUM: CPT | Mod: 91 | Performed by: THORACIC SURGERY (CARDIOTHORACIC VASCULAR SURGERY)

## 2023-11-03 PROCEDURE — 85730 THROMBOPLASTIN TIME PARTIAL: CPT | Performed by: THORACIC SURGERY (CARDIOTHORACIC VASCULAR SURGERY)

## 2023-11-03 PROCEDURE — 27000221 HC OXYGEN, UP TO 24 HOURS

## 2023-11-03 PROCEDURE — 25000003 PHARM REV CODE 250: Performed by: INTERNAL MEDICINE

## 2023-11-03 PROCEDURE — 94799 UNLISTED PULMONARY SVC/PX: CPT

## 2023-11-03 PROCEDURE — 25000003 PHARM REV CODE 250: Performed by: THORACIC SURGERY (CARDIOTHORACIC VASCULAR SURGERY)

## 2023-11-03 PROCEDURE — P9047 ALBUMIN (HUMAN), 25%, 50ML: HCPCS | Mod: JZ,JG | Performed by: THORACIC SURGERY (CARDIOTHORACIC VASCULAR SURGERY)

## 2023-11-03 PROCEDURE — C9399 UNCLASSIFIED DRUGS OR BIOLOG: HCPCS | Performed by: THORACIC SURGERY (CARDIOTHORACIC VASCULAR SURGERY)

## 2023-11-03 RX ORDER — GLUCAGON 1 MG
1 KIT INJECTION
Status: DISCONTINUED | OUTPATIENT
Start: 2023-11-03 | End: 2023-11-03

## 2023-11-03 RX ORDER — ACETAMINOPHEN 325 MG/1
650 TABLET ORAL EVERY 6 HOURS
Status: DISPENSED | OUTPATIENT
Start: 2023-11-03 | End: 2023-11-06

## 2023-11-03 RX ORDER — IBUPROFEN 200 MG
24 TABLET ORAL
Status: DISCONTINUED | OUTPATIENT
Start: 2023-11-03 | End: 2023-11-06

## 2023-11-03 RX ORDER — SODIUM CHLORIDE 9 MG/ML
INJECTION, SOLUTION INTRAVENOUS CONTINUOUS
Status: DISCONTINUED | OUTPATIENT
Start: 2023-11-03 | End: 2023-11-05

## 2023-11-03 RX ORDER — GLUCAGON 1 MG
1 KIT INJECTION
Status: DISCONTINUED | OUTPATIENT
Start: 2023-11-03 | End: 2023-11-06

## 2023-11-03 RX ORDER — ALBUMIN HUMAN 250 G/1000ML
25 SOLUTION INTRAVENOUS ONCE
Status: COMPLETED | OUTPATIENT
Start: 2023-11-03 | End: 2023-11-03

## 2023-11-03 RX ORDER — IBUPROFEN 200 MG
16 TABLET ORAL
Status: DISCONTINUED | OUTPATIENT
Start: 2023-11-03 | End: 2023-11-06

## 2023-11-03 RX ORDER — INSULIN ASPART 100 [IU]/ML
0-10 INJECTION, SOLUTION INTRAVENOUS; SUBCUTANEOUS EVERY 6 HOURS PRN
Status: DISCONTINUED | OUTPATIENT
Start: 2023-11-03 | End: 2023-11-03

## 2023-11-03 RX ORDER — INSULIN ASPART 100 [IU]/ML
0-10 INJECTION, SOLUTION INTRAVENOUS; SUBCUTANEOUS
Status: DISCONTINUED | OUTPATIENT
Start: 2023-11-03 | End: 2023-11-06

## 2023-11-03 RX ADMIN — DEXMEDETOMIDINE HYDROCHLORIDE 0.5 MCG/KG/HR: 4 INJECTION INTRAVENOUS at 04:11

## 2023-11-03 RX ADMIN — OXYCODONE HYDROCHLORIDE AND ACETAMINOPHEN 500 MG: 500 TABLET ORAL at 09:11

## 2023-11-03 RX ADMIN — IPRATROPIUM BROMIDE AND ALBUTEROL SULFATE 3 ML: 2.5; .5 SOLUTION RESPIRATORY (INHALATION) at 12:11

## 2023-11-03 RX ADMIN — CEFAZOLIN SODIUM 2 G: 2 SOLUTION INTRAVENOUS at 04:11

## 2023-11-03 RX ADMIN — FENTANYL CITRATE 25 MCG: 50 INJECTION INTRAMUSCULAR; INTRAVENOUS at 07:11

## 2023-11-03 RX ADMIN — CHLORHEXIDINE GLUCONATE 0.12% ORAL RINSE 10 ML: 1.2 LIQUID ORAL at 08:11

## 2023-11-03 RX ADMIN — SODIUM PHOSPHATE, MONOBASIC, MONOHYDRATE AND SODIUM PHOSPHATE, DIBASIC, ANHYDROUS 15 MMOL: 142; 276 INJECTION, SOLUTION INTRAVENOUS at 06:11

## 2023-11-03 RX ADMIN — ALBUMIN (HUMAN) 25 G: 2.5 SOLUTION INTRAVENOUS at 01:11

## 2023-11-03 RX ADMIN — SODIUM CHLORIDE: 9 INJECTION, SOLUTION INTRAVENOUS at 06:11

## 2023-11-03 RX ADMIN — OXYCODONE HYDROCHLORIDE 10 MG: 5 TABLET ORAL at 04:11

## 2023-11-03 RX ADMIN — OXYCODONE HYDROCHLORIDE 10 MG: 5 TABLET ORAL at 09:11

## 2023-11-03 RX ADMIN — DOCUSATE SODIUM 100 MG: 100 CAPSULE, LIQUID FILLED ORAL at 08:11

## 2023-11-03 RX ADMIN — OXYCODONE HYDROCHLORIDE AND ACETAMINOPHEN 500 MG: 500 TABLET ORAL at 08:11

## 2023-11-03 RX ADMIN — IPRATROPIUM BROMIDE AND ALBUTEROL SULFATE 3 ML: 2.5; .5 SOLUTION RESPIRATORY (INHALATION) at 07:11

## 2023-11-03 RX ADMIN — OXYCODONE HYDROCHLORIDE 10 MG: 5 TABLET ORAL at 12:11

## 2023-11-03 RX ADMIN — METOPROLOL TARTRATE 25 MG: 25 TABLET, FILM COATED ORAL at 08:11

## 2023-11-03 RX ADMIN — FERROUS SULFATE TAB 325 MG (65 MG ELEMENTAL FE) 1 EACH: 325 (65 FE) TAB at 08:11

## 2023-11-03 RX ADMIN — FUROSEMIDE 40 MG: 10 INJECTION, SOLUTION INTRAMUSCULAR; INTRAVENOUS at 09:11

## 2023-11-03 RX ADMIN — DOCUSATE SODIUM 100 MG: 100 CAPSULE, LIQUID FILLED ORAL at 09:11

## 2023-11-03 RX ADMIN — VASOPRESSIN 0.02 UNITS/MIN: 20 INJECTION INTRAVENOUS at 02:11

## 2023-11-03 RX ADMIN — AMIODARONE HYDROCHLORIDE 200 MG: 200 TABLET ORAL at 08:11

## 2023-11-03 RX ADMIN — POLYETHYLENE GLYCOL 3350 17 G: 17 POWDER, FOR SOLUTION ORAL at 08:11

## 2023-11-03 RX ADMIN — FENTANYL CITRATE 12.5 MCG: 50 INJECTION INTRAMUSCULAR; INTRAVENOUS at 06:11

## 2023-11-03 RX ADMIN — POTASSIUM CHLORIDE 20 MEQ: 1500 TABLET, EXTENDED RELEASE ORAL at 09:11

## 2023-11-03 RX ADMIN — VASOPRESSIN 0.01 UNITS/MIN: 20 INJECTION INTRAVENOUS at 10:11

## 2023-11-03 RX ADMIN — POTASSIUM CHLORIDE 20 MEQ: 1500 TABLET, EXTENDED RELEASE ORAL at 08:11

## 2023-11-03 RX ADMIN — MAGNESIUM SULFATE HEPTAHYDRATE 4 G: 40 INJECTION, SOLUTION INTRAVENOUS at 06:11

## 2023-11-03 RX ADMIN — IPRATROPIUM BROMIDE AND ALBUTEROL SULFATE 3 ML: 2.5; .5 SOLUTION RESPIRATORY (INHALATION) at 03:11

## 2023-11-03 RX ADMIN — OXYCODONE HYDROCHLORIDE 10 MG: 5 TABLET ORAL at 08:11

## 2023-11-03 RX ADMIN — AMIODARONE HYDROCHLORIDE 200 MG: 200 TABLET ORAL at 09:11

## 2023-11-03 RX ADMIN — FUROSEMIDE 40 MG: 10 INJECTION, SOLUTION INTRAMUSCULAR; INTRAVENOUS at 08:11

## 2023-11-03 RX ADMIN — ATORVASTATIN CALCIUM 80 MG: 40 TABLET, FILM COATED ORAL at 08:11

## 2023-11-03 RX ADMIN — ALBUMIN (HUMAN) 25 G: 5 SOLUTION INTRAVENOUS at 06:11

## 2023-11-03 RX ADMIN — ACETAMINOPHEN 650 MG: 325 TABLET ORAL at 11:11

## 2023-11-03 RX ADMIN — METOPROLOL TARTRATE 25 MG: 25 TABLET, FILM COATED ORAL at 09:11

## 2023-11-03 RX ADMIN — PANTOPRAZOLE SODIUM 40 MG: 40 INJECTION, POWDER, FOR SOLUTION INTRAVENOUS at 08:11

## 2023-11-03 RX ADMIN — OXYCODONE HYDROCHLORIDE 5 MG: 5 TABLET ORAL at 11:11

## 2023-11-03 RX ADMIN — MAGNESIUM HYDROXIDE 400 MG: 400 SUSPENSION ORAL at 08:11

## 2023-11-03 RX ADMIN — EPINEPHRINE 0.02 MCG/KG/MIN: 1 INJECTION INTRAMUSCULAR; INTRAVENOUS; SUBCUTANEOUS at 04:11

## 2023-11-03 RX ADMIN — CALCIUM CHLORIDE 2 G: 100 INJECTION, SOLUTION INTRAVENOUS at 07:11

## 2023-11-03 RX ADMIN — CHLORHEXIDINE GLUCONATE 0.12% ORAL RINSE 10 ML: 1.2 LIQUID ORAL at 09:11

## 2023-11-03 RX ADMIN — ACETAMINOPHEN 650 MG: 325 TABLET ORAL at 06:11

## 2023-11-03 NOTE — PT/OT/SLP PROGRESS
Occupational Therapy      Patient Name:  Carlin Isaac   MRN:  [id number]    OT eval orders received. Chart review completed. Presented to room at 1030 and spoke with nurse, Azeb. Patient with order to lay flat until after 1300. Will continue efforts.    Jenna Greenfield, OT  11/3/2023  1030

## 2023-11-03 NOTE — PROGRESS NOTES
[facility] - Intensive Care [facility])  Cardiothoracic Surgery  Progress Note    Patient Name: Carlin Isaac  MRN: [id number]  Admission Date: 10/28/2023  Hospital Length of Stay: 6 days  Code Status: Full Code   Attending Physician: Oscar Cazares MD   Referring Provider: Self, Aaareferral  Principal Problem:Cardiac arrest            Subjective:     Post-Op Info:  Procedure(s) (LRB):  CORONARY ARTERY BYPASS GRAFT (CABG) (N/A)  ECHOCARDIOGRAM,TRANSESOPHAGEAL (N/A)  SURGICAL PROCUREMENT, VEIN, ENDOSCOPIC (Left)  BLOCK, NERVE, INTERCOSTAL, 2 OR MORE (N/A)   1 Day Post-Op     Interval History:  The patient is postop day 1 status post urgent coronary artery bypass grafting x3.    ROS  Medications:  Continuous Infusions:   sodium chloride 0.9% 10 mL/hr at 11/03/23 0641    dexmedeTOMIDine (Precedex) infusion (titrating) Stopped (11/03/23 0645)    dextrose 5% lactated ringers 25 mL/hr at 11/03/23 0630    EPINEPHrine 0.05 mcg/kg/min (11/03/23 0630)    insulin regular 1 units/mL infusion orderable (CTS POST-OP) 2 Units/hr (11/03/23 0800)    sodium bicarbonate 25 mEq in dextrose 5 % (D5W) 1,000 mL Purge Solution for Impella 13.6 mL/hr at 11/03/23 0630    vasopressin Stopped (11/03/23 0604)     Scheduled Meds:   acetaminophen  650 mg Oral Q6H    albuterol-ipratropium  3 mL Nebulization Q4H    amiodarone  200 mg Oral BID    ascorbic acid (vitamin C)  500 mg Oral BID    aspirin  81 mg Oral Daily    atorvastatin  80 mg Oral Daily    chlorhexidine  10 mL Mouth/Throat BID    clopidogreL  75 mg Oral Daily    [START ON 11/4/2023] cyanocobalamin  1,000 mcg Oral Daily    docusate sodium  100 mg Oral BID    ferrous sulfate  1 tablet Oral Daily    [START ON 11/4/2023] folic acid  1 mg Oral Daily    furosemide (LASIX) injection  40 mg Intravenous BID    magnesium hydroxide 400 mg/5 ml  5 mL Oral BID    metoprolol tartrate  25 mg Oral BID    pantoprazole  40 mg Intravenous Daily    polyethylene glycol  17 g Oral Daily     potassium chloride  20 mEq Oral Q12H     PRN Meds:acetaminophen, albumin human 5%, calcium gluconate IVPB, calcium gluconate IVPB, calcium gluconate IVPB, ceFAZolin (ANCEF) IVPB, dextrose 10%, dextrose 10%, fentaNYL, fentaNYL, glucagon (human recombinant), hydrALAZINE, influenza 65up-adj, insulin aspart U-100, insulin regular 1 units/mL infusion orderable (CTS POST-OP), lactated ringers, magnesium sulfate IVPB, magnesium sulfate IVPB, magnesium sulfate IVPB, metoclopramide HCl, nitroGLYCERIN, ondansetron, oxyCODONE, oxyCODONE, potassium chloride in water, potassium chloride in water, potassium chloride in water, sodium chloride 0.9%, sodium chloride 0.9%, sodium phosphate 15 mmol in dextrose 5 % (D5W) 250 mL IVPB, sodium phosphate 20.01 mmol in dextrose 5 % (D5W) 250 mL IVPB, sodium phosphate 30 mmol in dextrose 5 % (D5W) 250 mL IVPB     Objective:     Vital Signs (Most Recent):  Temp: (!) 100.8 °F (38.2 °C) (11/03/23 0930)  Pulse: 89 (11/03/23 0930)  Resp: (!) 24 (11/03/23 0930)  BP: 98/62 (11/03/23 0900)  SpO2: 95 % (11/03/23 0930) Vital Signs (24h Range):  Temp:  [95.7 °F (35.4 °C)-101.7 °F (38.7 °C)] 100.8 °F (38.2 °C)  Pulse:  [] 89  Resp:  [18-36] 24  SpO2:  [86 %-100 %] 95 %  BP: ()/(50-62) 98/62  Arterial Line BP: ()/(50-72) 101/55     Weight:  (bed scale broken)  Body mass index is 27.31 kg/m².    SpO2: 95 %       Intake/Output - Last 3 Shifts         11/01 0700 11/02 0659 11/02 0700 11/03 0659 11/03 0700 11/04 0659    P.O.       I.V. (mL/kg) 1006.3 (10.2) 2998.1 (30.3)     IV Piggyback 657.3 2684     Total Intake(mL/kg) 1663.6 (16.8) 5682.1 (57.3)     Urine (mL/kg/hr) 2275 (1) 1445 (0.6)     Drains  50     Stool 0      Blood  1000     Chest Tube  1240     Total Output 2275 3735     Net -611.4 +1947.1            Stool Occurrence 1 x              Lines/Drains/Airways       Central Venous Catheter Line  Duration             Pulmonary Artery Catheter Assessment  11/02/23 0756 1 day      Introducer with Double Lumen 11/02/23 1400 Internal Jugular Right <1 day              Drain  Duration                  Urethral Catheter 10/28/23 1318 Temperature probe 5 days         Closed/Suction Drain 11/02/23 1049 Tube - 1 Left Leg Bulb 19 Fr. <1 day         Y Chest Tube 1 and 2 11/02/23 1054 1 Right Mediastinal 24 Fr. 2 Left Mediastinal 24 Fr. <1 day         Y Chest Tube 3 and 4 11/02/23 1100 3 Left Pleural 24 Fr. 4 Right Pleural 24 Fr. <1 day              Arterial Line  Duration             Arterial Line 11/02/23 0719 Left Radial 1 day              Line  Duration                  VAD 10/31/23 1025 Left ventricular assist device Impella 2 days         Pacer Wires 11/02/23 1400 <1 day              Peripheral Intravenous Line  Duration                  Peripheral IV - Single Lumen 11/02/23 0800 16 G Posterior;Proximal;Right Forearm 1 day                     Physical Exam  Constitutional:       Appearance: Normal appearance.   HENT:      Head: Normocephalic and atraumatic.      Nose: Nose normal.   Cardiovascular:      Rate and Rhythm: Normal rate and regular rhythm.      Heart sounds: Normal heart sounds.   Pulmonary:      Effort: Pulmonary effort is normal.      Breath sounds: Normal breath sounds.   Abdominal:      General: Abdomen is flat. Bowel sounds are normal.      Palpations: Abdomen is soft.   Musculoskeletal:      Right lower leg: No edema.      Left lower leg: No edema.   Skin:     General: Skin is warm and dry.   Neurological:      Mental Status: He is alert and oriented to person, place, and time.   Psychiatric:         Behavior: Behavior normal.            Significant Labs:  All pertinent labs from the last 24 hours have been reviewed.    Significant Diagnostics:  I have reviewed all pertinent imaging results/findings within the past 24 hours.    Assessment/Plan:     * Cardiac arrest  The patient is a 68-year-old male with coronary artery disease who is status post cardiac arrest.  Cardiac  catheterization shows severe multivessel coronary artery disease with left main stenosis.  An Impella ventricular assist device was placed in the cath lab.  The patient is a candidate for urgent coronary artery bypass grafting.  The plan is to rest the patient on the Impella over the next 24 hours.  Coronary artery bypass grafting will be scheduled for 11/02/2023.    Left main coronary artery disease  11/03/2023   The patient is postop day 1 status post urgent coronary artery bypass grafting x3.  Overall, the patient is doing well.    Neuro:  Patient is awake alert and oriented x3.  Neuro exam is nonfocal.  Patient is requiring p.o. narcotic medication supplemented with p.r.n. IV for pain control.  Patient's source of pain is is chronic back pain.  Patient has minimal incisional pain complaints.    Cardiac:  Patient has been hemodynamically stable.  Cardiac output has been in the excess of 5.5 L per minute.  Epi has been weaned to off.  Impella left ventricular assist device has been removed.  Respiratory:  Patient was extubated yesterday.  Patient having good sats on nasal cannula.  Continue pulmonary toileting.  Continue incentive spirometer.  GI:  Will advance patient's as tolerated.  LFTs that were elevated preoperatively continue to trend downward.  Renal:  Patient has good urine output.  Creatinine is 1.1.  Will start diuresis with Lasix.  Endocrine:  Glucose is controlled with an insulin drip.  Will convert to sliding scale.  Id:  Patient had a T-max of 101.7°.  White count is 14.  Suspect due to stress of surgery.  Will continue to follow.    Heme:  Hematocrit is 26.9 and platelet count is 132.  Will start aspirin and Plavix.  Activities:  Patient's is on bedrest for 3 hours status post Impella removal.  Will advance activities as tolerated.  Line tubes and drains:  Patient has a right IJ [place] and [place], Varela catheter, chest tubes, saphenectomy site JOHN drain, A-line and pacer wires          Oscar A Debora  MD  Cardiothoracic Surgery  [facility] - Intensive Care ([facility])

## 2023-11-03 NOTE — SUBJECTIVE & OBJECTIVE
Interval History:  The patient is postop day 1 status post urgent coronary artery bypass grafting x3.    ROS  Medications:  Continuous Infusions:   sodium chloride 0.9% 10 mL/hr at 11/03/23 0641    dexmedeTOMIDine (Precedex) infusion (titrating) Stopped (11/03/23 0645)    dextrose 5% lactated ringers 25 mL/hr at 11/03/23 0630    EPINEPHrine 0.05 mcg/kg/min (11/03/23 0630)    insulin regular 1 units/mL infusion orderable (CTS POST-OP) 2 Units/hr (11/03/23 0800)    sodium bicarbonate 25 mEq in dextrose 5 % (D5W) 1,000 mL Purge Solution for Impella 13.6 mL/hr at 11/03/23 0630    vasopressin Stopped (11/03/23 0604)     Scheduled Meds:   acetaminophen  650 mg Oral Q6H    albuterol-ipratropium  3 mL Nebulization Q4H    amiodarone  200 mg Oral BID    ascorbic acid (vitamin C)  500 mg Oral BID    aspirin  81 mg Oral Daily    atorvastatin  80 mg Oral Daily    chlorhexidine  10 mL Mouth/Throat BID    clopidogreL  75 mg Oral Daily    [START ON 11/4/2023] cyanocobalamin  1,000 mcg Oral Daily    docusate sodium  100 mg Oral BID    ferrous sulfate  1 tablet Oral Daily    [START ON 11/4/2023] folic acid  1 mg Oral Daily    furosemide (LASIX) injection  40 mg Intravenous BID    magnesium hydroxide 400 mg/5 ml  5 mL Oral BID    metoprolol tartrate  25 mg Oral BID    pantoprazole  40 mg Intravenous Daily    polyethylene glycol  17 g Oral Daily    potassium chloride  20 mEq Oral Q12H     PRN Meds:acetaminophen, albumin human 5%, calcium gluconate IVPB, calcium gluconate IVPB, calcium gluconate IVPB, ceFAZolin (ANCEF) IVPB, dextrose 10%, dextrose 10%, fentaNYL, fentaNYL, glucagon (human recombinant), hydrALAZINE, influenza 65up-adj, insulin aspart U-100, insulin regular 1 units/mL infusion orderable (CTS POST-OP), lactated ringers, magnesium sulfate IVPB, magnesium sulfate IVPB, magnesium sulfate IVPB, metoclopramide HCl, nitroGLYCERIN, ondansetron, oxyCODONE, oxyCODONE, potassium chloride in water, potassium chloride in water,  potassium chloride in water, sodium chloride 0.9%, sodium chloride 0.9%, sodium phosphate 15 mmol in dextrose 5 % (D5W) 250 mL IVPB, sodium phosphate 20.01 mmol in dextrose 5 % (D5W) 250 mL IVPB, sodium phosphate 30 mmol in dextrose 5 % (D5W) 250 mL IVPB     Objective:     Vital Signs (Most Recent):  Temp: (!) 100.8 °F (38.2 °C) (11/03/23 0930)  Pulse: 89 (11/03/23 0930)  Resp: (!) 24 (11/03/23 0930)  BP: 98/62 (11/03/23 0900)  SpO2: 95 % (11/03/23 0930) Vital Signs (24h Range):  Temp:  [95.7 °F (35.4 °C)-101.7 °F (38.7 °C)] 100.8 °F (38.2 °C)  Pulse:  [] 89  Resp:  [18-36] 24  SpO2:  [86 %-100 %] 95 %  BP: ()/(50-62) 98/62  Arterial Line BP: ()/(50-72) 101/55     Weight:  (bed scale broken)  Body mass index is 27.31 kg/m².    SpO2: 95 %       Intake/Output - Last 3 Shifts         11/01 0700 11/02 0659 11/02 0700 11/03 0659 11/03 0700 11/04 0659    P.O.       I.V. (mL/kg) 1006.3 (10.2) 2998.1 (30.3)     IV Piggyback 657.3 2684     Total Intake(mL/kg) 1663.6 (16.8) 5682.1 (57.3)     Urine (mL/kg/hr) 2275 (1) 1445 (0.6)     Drains  50     Stool 0      Blood  1000     Chest Tube  1240     Total Output 2275 3735     Net -611.4 +1947.1            Stool Occurrence 1 x              Lines/Drains/Airways       Central Venous Catheter Line  Duration             Pulmonary Artery Catheter Assessment  11/02/23 0756 1 day     Introducer with Double Lumen 11/02/23 1400 Internal Jugular Right <1 day              Drain  Duration                  Urethral Catheter 10/28/23 1318 Temperature probe 5 days         Closed/Suction Drain 11/02/23 1049 Tube - 1 Left Leg Bulb 19 Fr. <1 day         Y Chest Tube 1 and 2 11/02/23 1054 1 Right Mediastinal 24 Fr. 2 Left Mediastinal 24 Fr. <1 day         Y Chest Tube 3 and 4 11/02/23 1100 3 Left Pleural 24 Fr. 4 Right Pleural 24 Fr. <1 day              Arterial Line  Duration             Arterial Line 11/02/23 0719 Left Radial 1 day              Line  Duration                   VAD 10/31/23 1025 Left ventricular assist device Impella 2 days         Pacer Wires 11/02/23 1400 <1 day              Peripheral Intravenous Line  Duration                  Peripheral IV - Single Lumen 11/02/23 0800 16 G Posterior;Proximal;Right Forearm 1 day                     Physical Exam  Constitutional:       Appearance: Normal appearance.   HENT:      Head: Normocephalic and atraumatic.      Nose: Nose normal.   Cardiovascular:      Rate and Rhythm: Normal rate and regular rhythm.      Heart sounds: Normal heart sounds.   Pulmonary:      Effort: Pulmonary effort is normal.      Breath sounds: Normal breath sounds.   Abdominal:      General: Abdomen is flat. Bowel sounds are normal.      Palpations: Abdomen is soft.   Musculoskeletal:      Right lower leg: No edema.      Left lower leg: No edema.   Skin:     General: Skin is warm and dry.   Neurological:      Mental Status: He is alert and oriented to person, place, and time.   Psychiatric:         Behavior: Behavior normal.            Significant Labs:  All pertinent labs from the last 24 hours have been reviewed.    Significant Diagnostics:  I have reviewed all pertinent imaging results/findings within the past 24 hours.

## 2023-11-03 NOTE — ASSESSMENT & PLAN NOTE
11/03/2023   The patient is postop day 1 status post urgent coronary artery bypass grafting x3.  Overall, the patient is doing well.    Neuro:  Patient is awake alert and oriented x3.  Neuro exam is nonfocal.  Patient is requiring p.o. narcotic medication supplemented with p.r.n. IV for pain control.  Patient's source of pain is is chronic back pain.  Patient has minimal incisional pain complaints.    Cardiac:  Patient has been hemodynamically stable.  Cardiac output has been in the excess of 5.5 L per minute.  Epi has been weaned to off.  Impella left ventricular assist device has been removed.  Respiratory:  Patient was extubated yesterday.  Patient having good sats on nasal cannula.  Continue pulmonary toileting.  Continue incentive spirometer.  GI:  Will advance patient's as tolerated.  LFTs that were elevated preoperatively continue to trend downward.  Renal:  Patient has good urine output.  Creatinine is 1.1.  Will start diuresis with Lasix.  Endocrine:  Glucose is controlled with an insulin drip.  Will convert to sliding scale.  Id:  Patient had a T-max of 101.7°.  White count is 14.  Suspect due to stress of surgery.  Will continue to follow.    Heme:  Hematocrit is 26.9 and platelet count is 132.  Will start aspirin and Plavix.  Activities:  Patient's is on bedrest for 3 hours status post Impella removal.  Will advance activities as tolerated.  Line tubes and drains:  Patient has a right IJ Lodge and Cordis, Varela catheter, chest tubes, saphenectomy site JOHN drain, A-line and pacer wires

## 2023-11-03 NOTE — PROGRESS NOTES
[facility] ([facility])  Critical Care Medicine  Progress Note    Patient Name: Carlin Isaac  MRN: [id number]  Admission Date: 10/28/2023  Hospital Length of Stay: 6 days  Code Status: Full Code  Attending Provider: Oscar Cazares MD  Primary Care Provider: Eliot Ambriz MD   Principal Problem: Cardiac arrest    Subjective:     HPI:  Mr Isaac is a 67 y/o man with HTN, hypogonadism on chonic testosterone, Chrohn's dz/Ulcerative proctitis, CAD s/p stent to the proximal/mid LAD in 2007, and HLD who presents to the ER today after an out of hospital cardiac arrest.  History taken from the medical record and discussion with staff as patient is unable to provide history and no family at bedside during my exam.  Reportedly, he was working out at [employer] when he suddenly collapsed.  EMS administered 3 shocks, 3 epis, and amio bolus en route.  Presumed rythm was VF.    Of note, he had angina in Sept 2022 and [facility] with multi-vessel disease.  He was recommended for CABG, but he declined at the time as his angina had resolved.    In the ER, lab work notable for INR 1.3, Cr 1.9, mildly elevated LFTs,  with negative initial troponin.  Echo fairly unremarkable.  He is intubated and sedated.  TTM in place and will change goal to 36.  Currently on Levo for vasopressor support through CVL placed by ER.      Hospital/ICU Course:  10/30: HR and BP labile overnight with tachy-manuel rhythm requiring adjustment of drips. Levophed, Amiodarone, fentanyl and propofol infusing. Completed TTM yesterday afternoon  11/1: no acute events overnight. Off levophed and Nitro. Remains on amiodarone infusion. [facility] yesterday with impella placed. Plans for CABG with Dr. Cazares tomorrow  11/2: underwent CABG x3 today with Dr. Cazares- returned to ICU intubated, CT x 4, on precedex and Epi at 0.08, and impella in place  11/3: extubated yesterday evening  to NC. This morning CC is pain at surgical and CT sites. Epi at  0.05, impella in  place, CT x4 in place.           Objective:     Vital Signs (Most Recent):  Temp: 100 °F (37.8 °C) (11/03/23 0645)  Pulse: 89 (11/03/23 0735)  Resp: (!) 22 (11/03/23 0735)  BP: (!) 102/56 (11/03/23 0500)  SpO2: 96 % (11/03/23 0735) Vital Signs (24h Range):  Temp:  [99.9 °F (37.7 °C)-101.7 °F (38.7 °C)] 100 °F (37.8 °C)  Pulse:  [] 89  Resp:  [18-36] 22  SpO2:  [86 %-100 %] 96 %  BP: ()/(50-62) 102/56  Arterial Line BP: ()/(50-72) 104/59     Weight:  (bed scale broken)  Body mass index is 27.31 kg/m².      Intake/Output Summary (Last 24 hours) at 11/3/2023 0737  Last data filed at 11/3/2023 0655  Gross per 24 hour   Intake 5432.1 ml   Output 3735 ml   Net 1697.1 ml        Physical Exam  Vitals reviewed.   Constitutional:       Appearance: He is well-developed. He is ill-appearing.   HENT:      Head: Normocephalic and atraumatic.      Mouth/Throat:      Mouth: Mucous membranes are moist.      Pharynx: Oropharynx is clear.   Eyes:      Extraocular Movements: Extraocular movements intact.      Conjunctiva/sclera: Conjunctivae normal.   Cardiovascular:      Rate and Rhythm: Normal rate.      Comments: 90 paced rhythm. Mediastinal CT x 2, pleural CT x 2, pacer wires. Vac to  mid-sternal incision. Impella to right groin  Pulmonary:      Comments: Diminished in bases, clear. 2L NC  Abdominal:      General: There is no distension.      Palpations: Abdomen is soft.   Musculoskeletal:         General: No deformity.      Cervical back: Normal range of motion.      Comments: Right leg brace in place. ACE wrap to LLE with JOHN x 1    Neurological:      General: No focal deficit present.      Mental Status: He is alert and oriented to person, place, and time.   Psychiatric:         Behavior: Behavior is cooperative.           Review of Systems   Constitutional:  Positive for fever. Negative for chills.        Post-op pain at surgical site and CT site- 8/10, constant, aching/dull   HENT:  Negative  for congestion and sore throat.    Respiratory:  Negative for cough and shortness of breath.    Cardiovascular:  Negative for chest pain and leg swelling.   Gastrointestinal:  Negative for nausea and vomiting.   Genitourinary:  Negative for difficulty urinating and dysuria.   Musculoskeletal:  Positive for back pain. Negative for arthralgias.   Neurological:  Negative for dizziness and headaches.   Psychiatric/Behavioral:  Negative for sleep disturbance. The patient is not nervous/anxious.        Vents:  Vent Mode: Spont (11/02/23 1736)  Ventilator Initiated: Yes (11/02/23 1404)  Set Rate: 20 BPM (11/02/23 1500)  Vt Set: 500 mL (11/02/23 1500)  Pressure Support: 10 cmH20 (11/02/23 1736)  PEEP/CPAP: 5 cmH20 (11/02/23 1736)  Oxygen Concentration (%): 32 (11/03/23 0735)  Peak Airway Pressure: 16 cmH20 (11/02/23 1736)  Plateau Pressure: 0 cmH20 (11/02/23 1736)  Total Ve: 17.6 L/m (11/02/23 1736)  Negative Inspiratory Force (cm H2O): 0 (11/02/23 1736)  F/VT Ratio<105 (RSBI): (!) 46.98 (11/02/23 1736)    Lines/Drains/Airways       Central Venous Catheter Line  Duration              Introducer with Double Lumen 11/02/23 1400 Internal Jugular Right <1 day    Pulmonary Artery Catheter Assessment  11/02/23 0756 <1 day              Drain  Duration                  Urethral Catheter 10/28/23 1318 Temperature probe 5 days         Closed/Suction Drain 11/02/23 1049 Tube - 1 Left Leg Bulb 19 Fr. <1 day         Y Chest Tube 1 and 2 11/02/23 1054 1 Right Mediastinal 24 Fr. 2 Left Mediastinal 24 Fr. <1 day         Y Chest Tube 3 and 4 11/02/23 1100 3 Left Pleural 24 Fr. 4 Right Pleural 24 Fr. <1 day              Arterial Line  Duration             Arterial Line 11/02/23 0719 Left Radial 1 day              Line  Duration                  VAD 10/31/23 1025 Left ventricular assist device Impella 2 days         Pacer Wires 11/02/23 1400 <1 day              Peripheral Intravenous Line  Duration                  Peripheral IV - Single  Lumen 11/02/23 0800 16 G Posterior;Proximal;Right Forearm <1 day                    Significant Labs:    CBC/Anemia Profile:  Recent Labs   Lab 11/02/23  1314 11/02/23  1324 11/02/23  1418 11/02/23  1807 11/03/23 0418   WBC 21.00*  --  22.89*  --  14.22*   HGB 11.3*  --  11.2*  --  9.0*   HCT 34.6*   < > 34.7* 27* 26.9*   *  --  146*  --  132*   MCV 92  --  91  --  89   RDW 13.9  --  13.9  --  13.6    < > = values in this interval not displayed.        Chemistries:  Recent Labs   Lab 11/02/23  0412 11/02/23  1418 11/02/23  1708 11/03/23  0418    144 143 141   K 4.0 4.5 4.8 4.4    112* 110 107   CO2 25 21* 23 26   BUN 13 15 16 20   CREATININE 0.9 1.1 1.2 1.1   CALCIUM 8.6* 7.8* 7.7* 7.7*   ALBUMIN 2.5* 2.4*  --  2.7*   PROT 5.1* 4.4*  --  4.5*   BILITOT 0.8 0.6  --  0.7   ALKPHOS 44* 39*  --  31*   * 81*  --  56*   AST 94* 83*  --  54*   MG 1.8 2.8* 2.3 1.9   PHOS 2.4*  --   --  3.5       All pertinent labs within the past 24 hours have been reviewed.    Significant Imaging:  I have reviewed all pertinent imaging results/findings within the past 24 hours.      ABG  Recent Labs   Lab 11/02/23 1807   PH 7.470*   PO2 74*   PCO2 34.2*   HCO3 24.9   BE 1     Assessment/Plan:     Cardiac/Vascular  * Cardiac arrest  presumed VT/VF arrest given 3 shocks, 3 epis, and amio bolus in the field  CT head on admit without acute findings  S/p TTM and extubated 10/30  Developed CP overnight 10/30 with trop > 50  LHC 10/31 with multi-vessel disease, impella placed   Now s/p CABG        Hyperlipidemia  LFTs much better, statin resumed    Primary hypertension  Post-op on low dose Epi, weaning      Left main coronary artery disease  h/o PCI in 2007  LHC in 2022 with multi-vessel disease and pt declined CABG unless CP returned  troponin peaked at 6.192 prior to downtrending; CP returned 10/30 overnight with trop > 50  LHC 10/31 with multi-vessel disease  11/2- CABG x 3 with Dr. Cazares  Vent weaning to  extubation per protocol  Insulin infusion to be transitioned to SQ insulin   Monitor CT output, JOHN drain care, pain management   Weaning Epinephrine as hemodynamics allow  impella in place  Plan per CTS      Endocrine  Hypogonadism in male  Holding testosterone replacement    GI  Ulcerative proctitis  Doesn't appear he takes anything regularly at home (mention of PRN sulfasalazine in some clinic notes)  Supportive Care      DVT prophylaxis: SCDs, chemical ppx when ok with CTS  GI prophylaxis: protonix  Code status: Full   Disposition: cont ICU care    Sandra Dobson NP  Critical Care Medicine  [facility] ([facility])

## 2023-11-03 NOTE — SUBJECTIVE & OBJECTIVE
Interval History:     Review of Systems   Constitutional: Negative. Negative for weight gain.   HENT: Negative.     Eyes: Negative.    Cardiovascular: Negative.  Negative for chest pain, leg swelling and palpitations.   Respiratory: Negative.  Negative for shortness of breath.    Endocrine: Negative.    Hematologic/Lymphatic: Negative.    Skin: Negative.    Musculoskeletal:  Negative for muscle weakness.   Gastrointestinal: Negative.    Genitourinary: Negative.    Neurological: Negative.  Negative for dizziness.   Psychiatric/Behavioral: Negative.     Allergic/Immunologic: Negative.    All other systems reviewed and are negative.    Objective:     Vital Signs (Most Recent):  Temp: 100.2 °F (37.9 °C) (11/03/23 1130)  Pulse: 89 (11/03/23 1218)  Resp: 20 (11/03/23 1218)  BP: (Abnormal) 104/56 (11/03/23 1100)  SpO2: 96 % (11/03/23 1218) Vital Signs (24h Range):  Temp:  [95.7 °F (35.4 °C)-101.7 °F (38.7 °C)] 100.2 °F (37.9 °C)  Pulse:  [] 89  Resp:  [18-36] 20  SpO2:  [86 %-100 %] 96 %  BP: ()/(50-62) 104/56  Arterial Line BP: ()/(44-72) 106/46     Weight:  (bed scale broken)  Body mass index is 27.31 kg/m².     SpO2: 96 %         Intake/Output Summary (Last 24 hours) at 11/3/2023 1234  Last data filed at 11/3/2023 1000  Gross per 24 hour   Intake 5912.93 ml   Output 4090 ml   Net 1822.93 ml       Lines/Drains/Airways       Central Venous Catheter Line       Name Duration    Pulmonary Artery Catheter Assessment  11/02/23 0756 1 day     Introducer with Double Lumen 11/02/23 1400 Internal Jugular Right <1 day              Drain       Name Duration         Urethral Catheter 10/28/23 1318 Temperature probe 5 days         Closed/Suction Drain 11/02/23 1049 Tube - 1 Left Leg Bulb 19 Fr. 1 day         Y Chest Tube 1 and 2 11/02/23 1054 1 Right Mediastinal 24 Fr. 2 Left Mediastinal 24 Fr. 1 day         Y Chest Tube 3 and 4 11/02/23 1100 3 Left Pleural 24 Fr. 4 Right Pleural 24 Fr. 1 day              Arterial  Line       Name Duration    Arterial Line 11/02/23 0719 Left Radial 1 day              Line       Name Duration         VAD 10/31/23 1025 Left ventricular assist device Impella 3 days         Pacer Wires 11/02/23 1400 <1 day              Peripheral Intravenous Line       Name Duration         Peripheral IV - Single Lumen 11/02/23 0800 16 G Posterior;Proximal;Right Forearm 1 day                       Physical Exam  Vitals and nursing note reviewed.   Constitutional:       Appearance: He is well-developed.   HENT:      Head: Normocephalic and atraumatic.   Eyes:      Conjunctiva/sclera: Conjunctivae normal.      Pupils: Pupils are equal, round, and reactive to light.   Cardiovascular:      Rate and Rhythm: Normal rate and regular rhythm.      Pulses: Intact distal pulses.      Heart sounds: Normal heart sounds.   Pulmonary:      Effort: Pulmonary effort is normal.      Breath sounds: Normal breath sounds.   Abdominal:      General: Bowel sounds are normal.      Palpations: Abdomen is soft.   Musculoskeletal:      Cervical back: Normal range of motion and neck supple.   Skin:     General: Skin is warm and dry.   Neurological:      Mental Status: He is alert and oriented to person, place, and time.            Significant Labs: All pertinent lab results from the last 24 hours have been reviewed.    Significant Imaging: X-Ray: CXR: X-Ray Chest 1 View (CXR): No results found for this visit on 10/28/23.

## 2023-11-03 NOTE — HOSPITAL COURSE
11/03/2023   The patient is postop day 1 status post urgent coronary artery bypass grafting x3.    11/04/2023   The patient is postop day 2 status post urgent coronary artery bypass grafting x3.    11/05/2023   The patient is postop day 3 status post urgent coronary artery bypass grafting x3.    11/06/2023   The patient is postop day 4 status post coronary artery bypass grafting x3.    11/07/2023   The patient is postop day 5 status post urgent coronary artery bypass grafting x3.

## 2023-11-03 NOTE — PROGRESS NOTES
[facility] ([facility])  Cardiology  Progress Note    Patient Name: Carlin Isaac  MRN: [id number]  Admission Date: 10/28/2023  Hospital Length of Stay: 6 days  Code Status: Full Code   Attending Physician: Oscar Cazares MD   Primary Care Physician: Eliot Ambriz MD  Expected Discharge Date:   Principal Problem:Cardiac arrest    Subjective:     Hospital Course:   10/29/2023: The patient is supported intubated stable.  Cardiac echo shows normal LV function troponin levels are elevated EKG shows ST segments have reverted back to normal sinus rhythm with normal intervals and no evidence of further ST changes.  Putting the wife the patient has significant coronary disease and had refused bypass surgery last year.  Will plan another heart catheterization after he is wakeful and extubated.  Clinically stable this time with supportive care.    10/30/23   Intubated, NSR, some nonsustained tachyrhythmia overnight.Pt on Levo, Amio,.     10/31/23 CP last night requiring tridil, troponin > 50, no CP on exam. Discussed cath with pt and agrees. Will schedule with Dr. Mathew    11/1/23   No CP, CABG tomorrow, impella in place, no CP    11/2/23  s/p 3V CABG, on post op vasopressors, tele NSR    11/3/23   POD #2, extubated, impella removed, tele monitor A paicng, epi weaned off      Interval History:     Review of Systems   Constitutional: Negative. Negative for weight gain.   HENT: Negative.     Eyes: Negative.    Cardiovascular: Negative.  Negative for chest pain, leg swelling and palpitations.   Respiratory: Negative.  Negative for shortness of breath.    Endocrine: Negative.    Hematologic/Lymphatic: Negative.    Skin: Negative.    Musculoskeletal:  Negative for muscle weakness.   Gastrointestinal: Negative.    Genitourinary: Negative.    Neurological: Negative.  Negative for dizziness.   Psychiatric/Behavioral: Negative.     Allergic/Immunologic: Negative.    All other systems reviewed and are  negative.    Objective:     Vital Signs (Most Recent):  Temp: 100.2 °F (37.9 °C) (11/03/23 1130)  Pulse: 89 (11/03/23 1218)  Resp: 20 (11/03/23 1218)  BP: (Abnormal) 104/56 (11/03/23 1100)  SpO2: 96 % (11/03/23 1218) Vital Signs (24h Range):  Temp:  [95.7 °F (35.4 °C)-101.7 °F (38.7 °C)] 100.2 °F (37.9 °C)  Pulse:  [] 89  Resp:  [18-36] 20  SpO2:  [86 %-100 %] 96 %  BP: ()/(50-62) 104/56  Arterial Line BP: ()/(44-72) 106/46     Weight:  (bed scale broken)  Body mass index is 27.31 kg/m².     SpO2: 96 %         Intake/Output Summary (Last 24 hours) at 11/3/2023 1234  Last data filed at 11/3/2023 1000  Gross per 24 hour   Intake 5912.93 ml   Output 4090 ml   Net 1822.93 ml       Lines/Drains/Airways       Central Venous Catheter Line       Name Duration    Pulmonary Artery Catheter Assessment  11/02/23 0756 1 day     Introducer with Double Lumen 11/02/23 1400 Internal Jugular Right <1 day              Drain       Name Duration         Urethral Catheter 10/28/23 1318 Temperature probe 5 days         Closed/Suction Drain 11/02/23 1049 Tube - 1 Left Leg Bulb 19 Fr. 1 day         Y Chest Tube 1 and 2 11/02/23 1054 1 Right Mediastinal 24 Fr. 2 Left Mediastinal 24 Fr. 1 day         Y Chest Tube 3 and 4 11/02/23 1100 3 Left Pleural 24 Fr. 4 Right Pleural 24 Fr. 1 day              Arterial Line       Name Duration    Arterial Line 11/02/23 0719 Left Radial 1 day              Line       Name Duration         VAD 10/31/23 1025 Left ventricular assist device Impella 3 days         Pacer Wires 11/02/23 1400 <1 day              Peripheral Intravenous Line       Name Duration         Peripheral IV - Single Lumen 11/02/23 0800 16 G Posterior;Proximal;Right Forearm 1 day                       Physical Exam  Vitals and nursing note reviewed.   Constitutional:       Appearance: He is well-developed.   HENT:      Head: Normocephalic and atraumatic.   Eyes:      Conjunctiva/sclera: Conjunctivae normal.      Pupils:  Pupils are equal, round, and reactive to light.   Cardiovascular:      Rate and Rhythm: Normal rate and regular rhythm.      Pulses: Intact distal pulses.      Heart sounds: Normal heart sounds.   Pulmonary:      Effort: Pulmonary effort is normal.      Breath sounds: Normal breath sounds.   Abdominal:      General: Bowel sounds are normal.      Palpations: Abdomen is soft.   Musculoskeletal:      Cervical back: Normal range of motion and neck supple.   Skin:     General: Skin is warm and dry.   Neurological:      Mental Status: He is alert and oriented to person, place, and time.            Significant Labs: All pertinent lab results from the last 24 hours have been reviewed.    Significant Imaging: X-Ray: CXR: X-Ray Chest 1 View (CXR): No results found for this visit on 10/28/23.    Assessment and Plan:     Brief HPI:     * Cardiac arrest  Will continue supportive care  Continue amiodarone  Continue enoxaparin  Add BB if needed    Left main coronary artery disease  Significant CAD, EKG shows anterior and lateral ischemia  Plan nitrates if possible  1 st troponin negative and will trend  Likely cardiac event was VT  Cardiac echo shows  Preserved LV function        VTE Risk Mitigation (From admission, onward)         Ordered     IP VTE LOW RISK PATIENT  Once         11/01/23 1945     Place JUAN PABLO hose  Until discontinued         11/01/23 1945     heparin 25,000 units in dextrose 5% (100 units/ml) IV bolus from bag - ADDITIONAL PRN BOLUS - 30 units/kg (max bolus 4000 units)  As needed (PRN)        Question:  Heparin Infusion Adjustment (DO NOT MODIFY ANSWER)  Answer:  [email or web address]\epic\Images\Pharmacy\HeparinInfusions\heparin LOW INTENSITY nomogram for [facility] [id number].pdf    10/31/23 0001     heparin 25,000 units in dextrose 5% (100 units/ml) IV bolus from bag - ADDITIONAL PRN BOLUS - 60 units/kg (max bolus 4000 units)  As needed (PRN)        Question:  Heparin Infusion Adjustment (DO NOT MODIFY ANSWER)  Answer:   \\[email or web address]\epic\Images\Pharmacy\HeparinInfusions\heparin LOW INTENSITY nomogram for [facility] [id number].pdf    10/30/23 2212     heparin 25,000 units in dextrose 5% 250 mL (100 units/mL) infusion LOW INTENSITY nomogram - [facility]  Continuous        Question:  Begin at (units/kg/hr)  Answer:  12    10/30/23 2212     Place sequential compression device  Until discontinued         10/28/23 1229                Jared Nieto MD  Cardiology  [facility] ([facility])

## 2023-11-03 NOTE — CONSULTS
[facility] - [facility] ([facility])  Adult Nutrition  Consult Note    SUMMARY     Recommendations    Recommendation/Intervention:   1. Recommend pt continues on Cardiac diet   2. Recommend pt continues Boost plus TID, modify to BID when PO intake > 75%   3. Recommend Fred BID x 2 weeks for wound healing   4. Recommend feeding assistance if warranted, Encourage PO and supplement intake   5. Provide nutrition education   6. Daily weights    Goals:   1. Pt will tolerate and consume > 75% EEN and EPN by RD follow up   2. Pt will consume Fred BID prior to RD follow up   3. Pt will be provided nutrition education at RD follow up  Nutrition Goal Status: new  Communication of RD Recs: other (comment) (POC, sticky note)    Assessment and Plan    Nutrition Problem  Inadequate oral intake   Increased protein needs   Food and nutrition related knowledge deficit    Related to (etiology):   Inadequate energy intake  Increased demand for nutrition   Lack of prior exposure to accurate nutrition related information     Signs and Symptoms (as evidenced by):   Estimated intake of food less than estimated needs d/t intubated yesterday/Surgery  Surgical wound healing needs   CABG x 3    Interventions/Recommendations (treatment strategy):  1. Decreased sodium and fat modified diet  2. Commercial beverage  3. Mineral and medical food supplement therapy for wound healing  4. Feeding assistance, PO and supplement encouragement   5. Content related nutrition education   6. Collaboration with other providers     Nutrition Diagnosis Status:   New       Malnutrition Assessment     Skin (Micronutrient): dry, wounds unhealed (Lex score = 15 (mild risk)                                 Reason for Assessment    Reason For Assessment: consult  Diagnosis:  (Cardiac arrest)  Relevant Medical History: CAD, HTN, HLD, Hypogonadism in male, Ulcerative protitis  Interdisciplinary Rounds: did not attend  General Information Comments:   11/3/23: 68 y.o.  "Male admitted for Cardiac arrest. Pt curently on Cardaic diet, advanced from NPO at lunch, in ICU. RD currently following pt. Per Cardiothoracic Surgery Physician note 11/3 "The patient is postop day 1 status post urgent coronary artery bypass grafting x3.  Overall, the patient is doing well; Patient has been hemodynamically stable; Patient was extubated yesterday; Will advance patient's diet as tolerated". Visited pt at bedside, pt sleeping. Spoke to RN, stated pt's diet will advance at lunch, informed RN via secure chat that pt stated at last RD visit that he is receptive to Boost plus, RN added to orders, added to pt trays, pt will begin to receive at lunch, inquired about pt PO intake for lunch, RN stated via secure chat "it was his first time eating so he took a couple bites and finished his boost".  Pt not appropriate for nutrition education at this time d/t CABG surgery yesterday, will provide at RD follow up. Reviewed chart: LBM 11/2; Skin: dry, incision chest/ R groin, L leg puncture, all WDL; Lex score: 15 (mild risk); Edema: None. Labs, meds, weight reviewed. Weight charted 11/1 220 lbs, 11/2 218 lbs (BMI 27.31, Normal for age), -2 lb wt loss x 1 day. No NFPE warranted at this time, BMI WNL. RD will continue to monitor.    Nutrition Discharge Planning: Cardaic diet +Fred BID x 2 weeks + Boost plus as warranted    Nutrition Risk Screen    Nutrition Risk Screen: no indicators present    Nutrition/Diet History    Spiritual, Cultural Beliefs, Moravian Practices, Values that Affect Care: no  Food Allergies: NKFA  Factors Affecting Nutritional Intake: None identified at this time    Anthropometrics    Temp: 100.2 °F (37.9 °C)  Height: 6' 3" (190.5 cm)  Height (inches): 75 in  Weight Method: Bed Scale  Weight: 99.1 kg (218 lb 7.6 oz)  Weight (lb): 218.48 lb  Ideal Body Weight (IBW), Male: 196 lb  % Ideal Body Weight, Male (lb): 111.47 %  BMI (Calculated): 27.3  BMI Grade: 25 - 29.9 - overweight (Normal for " age)     Wt Readings from Last 15 Encounters:   11/03/23 99.1 kg (218 lb 7.6 oz)   06/02/23 95.3 kg (210 lb 1.6 oz)   06/03/22 100.2 kg (220 lb 14.4 oz)     Lab/Procedures/Meds    Pertinent Labs Reviewed: reviewed  Pertinent Labs Comments: Calcium (L), Total protein (L), Albumin (L), Alk phos (L), Glu (H), AST (H), ALT (H)  Pertinent Medications Reviewed: reviewed  Pertinent Medications Comments: potassium chloride, polyethylene glycol, pantoprazole, Lopressor, magnesium hydroxide, furosemide, ferrous sulfate, docusate, clopidogrel, atorvastatin, aspirin, vitamin C, amiodarone, albuterol    BMP  Lab Results   Component Value Date     11/03/2023    K 4.4 11/03/2023     11/03/2023    CO2 26 11/03/2023    BUN 20 11/03/2023    CREATININE 1.1 11/03/2023    CALCIUM 7.7 (L) 11/03/2023    ANIONGAP 8 11/03/2023    EGFRNORACEVR >60 11/03/2023     Lab Results   Component Value Date    ALT 56 (H) 11/03/2023    AST 54 (H) 11/03/2023    ALKPHOS 31 (L) 11/03/2023    BILITOT 0.7 11/03/2023     Lab Results   Component Value Date    ALBUMIN 2.7 (L) 11/03/2023     Recent Labs   Lab 11/03/23  1207   POCTGLUCOSE 112*     Lab Results   Component Value Date    HGBA1C 5.5 11/01/2023     Scheduled Meds:   acetaminophen  650 mg Oral Q6H    amiodarone  200 mg Oral BID    ascorbic acid (vitamin C)  500 mg Oral BID    aspirin  81 mg Oral Daily    atorvastatin  80 mg Oral Daily    chlorhexidine  10 mL Mouth/Throat BID    clopidogreL  75 mg Oral Daily    [START ON 11/4/2023] cyanocobalamin  1,000 mcg Oral Daily    docusate sodium  100 mg Oral BID    ferrous sulfate  1 tablet Oral Daily    [START ON 11/4/2023] folic acid  1 mg Oral Daily    furosemide (LASIX) injection  40 mg Intravenous BID    magnesium hydroxide 400 mg/5 ml  5 mL Oral BID    metoprolol tartrate  25 mg Oral BID    pantoprazole  40 mg Intravenous Daily    polyethylene glycol  17 g Oral Daily    potassium chloride  20 mEq Oral Q12H     Continuous Infusions:   sodium  chloride 0.9% Stopped (11/03/23 1204)    dexmedeTOMIDine (Precedex) infusion (titrating) Stopped (11/03/23 0645)    EPINEPHrine 0.02 mcg/kg/min (11/03/23 1300)    sodium bicarbonate 25 mEq in dextrose 5 % (D5W) 1,000 mL Purge Solution for Impella 13.6 mL/hr at 11/03/23 1300    vasopressin Stopped (11/03/23 0604)     PRN Meds:.acetaminophen, albumin human 5%, calcium gluconate IVPB, calcium gluconate IVPB, calcium gluconate IVPB, ceFAZolin (ANCEF) IVPB, dextrose 10%, dextrose 10%, fentaNYL, fentaNYL, glucagon (human recombinant), glucose, glucose, hydrALAZINE, influenza 65up-adj, insulin aspart U-100, lactated ringers, magnesium sulfate IVPB, magnesium sulfate IVPB, magnesium sulfate IVPB, metoclopramide HCl, nitroGLYCERIN, ondansetron, oxyCODONE, oxyCODONE, potassium chloride in water, potassium chloride in water, potassium chloride in water, sodium chloride 0.9%, sodium chloride 0.9%, sodium phosphate 15 mmol in dextrose 5 % (D5W) 250 mL IVPB, sodium phosphate 20.01 mmol in dextrose 5 % (D5W) 250 mL IVPB, sodium phosphate 30 mmol in dextrose 5 % (D5W) 250 mL IVPB    Physical Findings/Assessment         Estimated/Assessed Needs    Weight Used For Calorie Calculations: 99.1 kg (218 lb 7.6 oz)  Energy Calorie Requirements (kcal): 1976-1867 kcals (MSJ x 1.2-1.4 AF (CABG, BMI 27.31)  Energy Need Method: Evan Tamayo  Protein Requirements: 79-99 g (0.8-1.0 g/kg ABW (CABG, maintenance)  Weight Used For Protein Calculations: 99.1 kg (218 lb 7.6 oz)  Fluid Requirements (mL): 2188-1295 mL (1 mL/kcal)  Estimated Fluid Requirement Method: RDA Method  RDA Method (mL): 2221  CHO Requirement: 277-324 g (6111-4580 kcals/8)      Nutrition Prescription Ordered    Current Diet Order: Cardiac diet  Oral Nutrition Supplement: Boost plus TID    Evaluation of Received Nutrient/Fluid Intake  I/O: (Net since admit)  11/3: +3120.4 mL    Energy Calories Required: not meeting needs  Protein Required: not meeting needs  Fluid Required:  exceeds needs  Total Fluid Intake (mL): 5682.1  Tolerance: tolerating  % Intake of Estimated Energy Needs: 0 - 25 %  % Meal Intake: 0 - 25 %    Nutrition Risk    Level of Risk/Frequency of Follow-up: high (F/u x 2 weekly)       Monitor and Evaluation    Food and Nutrient Intake: energy intake, food and beverage intake  Food and Nutrient Adminstration: diet order  Knowledge/Beliefs/Attitudes: food and nutrition knowledge/skill, beliefs and attitudes  Physical Activity and Function: factors affecting access to physical activity, nutrition-related ADLs and IADLs  Anthropometric Measurements: weight, weight change, body mass index  Biochemical Data, Medical Tests and Procedures: electrolyte and renal panel, glucose/endocrine profile  Nutrition-Focused Physical Findings: overall appearance, skin, extremities, muscles and bones       Nutrition Follow-Up    RD Follow-up?: Yes  Ernestine Meracdo, Registration Eligible, Provisional LDN

## 2023-11-03 NOTE — NURSING
Fem stop removed at 1130 as per instructions from cath lab. Site remained soft and without hematome. Strong pedal pulses. Pt allowed to raise head at 1300 without complications.

## 2023-11-03 NOTE — PLAN OF CARE
Nutrition Recommendations 11/3:  1. Recommend pt continues on Cardiac diet   2. Recommend pt continues Boost plus TID, modify to BID when PO intake > 75%   3. Recommend Fred BID x 2 weeks for wound healing   4. Recommend feeding assistance if warranted, Encourage PO and supplement intake   5. Provide nutrition education   6. Daily weights  7. Collaboration with other providers     Goals:   1. Pt will tolerate and consume > 75% EEN and EPN by RD follow up   2. Pt will consume Fred BID prior to RD follow up   3. Pt will be provided nutrition education at RD follow up    Ernestine Mercado, Registration Eligible, Provisional LDN

## 2023-11-03 NOTE — PT/OT/SLP PROGRESS
Physical Therapy      Patient Name:  Carlin Isaac   MRN:  [id number]    PT eval orders received. Chart review completed. Presented to room at 1030 and spoke with nurse, Azeb. Patient with order to lay flat until after 1300. Will continue efforts.     Danay Addison, PT  11/3/2023  1030

## 2023-11-03 NOTE — SUBJECTIVE & OBJECTIVE
Objective:     Vital Signs (Most Recent):  Temp: 100 °F (37.8 °C) (11/03/23 0645)  Pulse: 89 (11/03/23 0735)  Resp: (!) 22 (11/03/23 0735)  BP: (!) 102/56 (11/03/23 0500)  SpO2: 96 % (11/03/23 0735) Vital Signs (24h Range):  Temp:  [99.9 °F (37.7 °C)-101.7 °F (38.7 °C)] 100 °F (37.8 °C)  Pulse:  [] 89  Resp:  [18-36] 22  SpO2:  [86 %-100 %] 96 %  BP: ()/(50-62) 102/56  Arterial Line BP: ()/(50-72) 104/59     Weight:  (bed scale broken)  Body mass index is 27.31 kg/m².      Intake/Output Summary (Last 24 hours) at 11/3/2023 0737  Last data filed at 11/3/2023 0655  Gross per 24 hour   Intake 5432.1 ml   Output 3735 ml   Net 1697.1 ml        Physical Exam  Vitals reviewed.   Constitutional:       Appearance: He is well-developed. He is ill-appearing.   HENT:      Head: Normocephalic and atraumatic.      Mouth/Throat:      Mouth: Mucous membranes are moist.      Pharynx: Oropharynx is clear.   Eyes:      Extraocular Movements: Extraocular movements intact.      Conjunctiva/sclera: Conjunctivae normal.   Cardiovascular:      Rate and Rhythm: Normal rate.      Comments: 90 paced rhythm. Mediastinal CT x 2, pleural CT x 2, pacer wires. Vac to  mid-sternal incision. Impella to right groin  Pulmonary:      Comments: Diminished in bases, clear. 2L NC  Abdominal:      General: There is no distension.      Palpations: Abdomen is soft.   Musculoskeletal:         General: No deformity.      Cervical back: Normal range of motion.      Comments: Right leg brace in place. ACE wrap to LLE with JOHN x 1    Neurological:      General: No focal deficit present.      Mental Status: He is alert and oriented to person, place, and time.   Psychiatric:         Behavior: Behavior is cooperative.           Review of Systems   Constitutional:  Positive for fever. Negative for chills.        Post-op pain at surgical site and CT site- 8/10, constant, aching/dull   HENT:  Negative for congestion and sore throat.    Respiratory:   Negative for cough and shortness of breath.    Cardiovascular:  Negative for chest pain and leg swelling.   Gastrointestinal:  Negative for nausea and vomiting.   Genitourinary:  Negative for difficulty urinating and dysuria.   Musculoskeletal:  Positive for back pain. Negative for arthralgias.   Neurological:  Negative for dizziness and headaches.   Psychiatric/Behavioral:  Negative for sleep disturbance. The patient is not nervous/anxious.        Vents:  Vent Mode: Spont (11/02/23 1736)  Ventilator Initiated: Yes (11/02/23 1404)  Set Rate: 20 BPM (11/02/23 1500)  Vt Set: 500 mL (11/02/23 1500)  Pressure Support: 10 cmH20 (11/02/23 1736)  PEEP/CPAP: 5 cmH20 (11/02/23 1736)  Oxygen Concentration (%): 32 (11/03/23 0735)  Peak Airway Pressure: 16 cmH20 (11/02/23 1736)  Plateau Pressure: 0 cmH20 (11/02/23 1736)  Total Ve: 17.6 L/m (11/02/23 1736)  Negative Inspiratory Force (cm H2O): 0 (11/02/23 1736)  F/VT Ratio<105 (RSBI): (!) 46.98 (11/02/23 1736)    Lines/Drains/Airways       Central Venous Catheter Line  Duration              Introducer with Double Lumen 11/02/23 1400 Internal Jugular Right <1 day    Pulmonary Artery Catheter Assessment  11/02/23 0756 <1 day              Drain  Duration                  Urethral Catheter 10/28/23 1318 Temperature probe 5 days         Closed/Suction Drain 11/02/23 1049 Tube - 1 Left Leg Bulb 19 Fr. <1 day         Y Chest Tube 1 and 2 11/02/23 1054 1 Right Mediastinal 24 Fr. 2 Left Mediastinal 24 Fr. <1 day         Y Chest Tube 3 and 4 11/02/23 1100 3 Left Pleural 24 Fr. 4 Right Pleural 24 Fr. <1 day              Arterial Line  Duration             Arterial Line 11/02/23 0719 Left Radial 1 day              Line  Duration                  VAD 10/31/23 1025 Left ventricular assist device Impella 2 days         Pacer Wires 11/02/23 1400 <1 day              Peripheral Intravenous Line  Duration                  Peripheral IV - Single Lumen 11/02/23 0800 16 G Posterior;Proximal;Right  Forearm <1 day                    Significant Labs:    CBC/Anemia Profile:  Recent Labs   Lab 11/02/23  1314 11/02/23  1324 11/02/23  1418 11/02/23  1807 11/03/23  0418   WBC 21.00*  --  22.89*  --  14.22*   HGB 11.3*  --  11.2*  --  9.0*   HCT 34.6*   < > 34.7* 27* 26.9*   *  --  146*  --  132*   MCV 92  --  91  --  89   RDW 13.9  --  13.9  --  13.6    < > = values in this interval not displayed.        Chemistries:  Recent Labs   Lab 11/02/23  0412 11/02/23  1418 11/02/23  1708 11/03/23  0418    144 143 141   K 4.0 4.5 4.8 4.4    112* 110 107   CO2 25 21* 23 26   BUN 13 15 16 20   CREATININE 0.9 1.1 1.2 1.1   CALCIUM 8.6* 7.8* 7.7* 7.7*   ALBUMIN 2.5* 2.4*  --  2.7*   PROT 5.1* 4.4*  --  4.5*   BILITOT 0.8 0.6  --  0.7   ALKPHOS 44* 39*  --  31*   * 81*  --  56*   AST 94* 83*  --  54*   MG 1.8 2.8* 2.3 1.9   PHOS 2.4*  --   --  3.5       All pertinent labs within the past 24 hours have been reviewed.    Significant Imaging:  I have reviewed all pertinent imaging results/findings within the past 24 hours.

## 2023-11-03 NOTE — ASSESSMENT & PLAN NOTE
h/o PCI in 2007  LHC in 2022 with multi-vessel disease and pt declined CABG unless CP returned  troponin peaked at 6.192 prior to downtrending; CP returned 10/30 overnight with trop > 50  C 10/31 with multi-vessel disease  11/2- CABG x 3 with Dr. Cazares  Vent weaning to extubation per protocol  Insulin infusion to be transitioned to SQ insulin   Monitor CT output, JOHN drain care, pain management   Weaning Epinephrine as hemodynamics allow  impella in place  Plan per CTS

## 2023-11-04 PROBLEM — Z95.1 S/P CABG X 3: Status: ACTIVE | Noted: 2023-11-04

## 2023-11-04 LAB
ALBUMIN SERPL BCP-MCNC: 2.9 G/DL (ref 3.5–5.2)
ALP SERPL-CCNC: 33 U/L (ref 55–135)
ALT SERPL W/O P-5'-P-CCNC: 44 U/L (ref 10–44)
ANION GAP SERPL CALC-SCNC: 11 MMOL/L (ref 8–16)
AST SERPL-CCNC: 49 U/L (ref 10–40)
BILIRUB SERPL-MCNC: 0.5 MG/DL (ref 0.1–1)
BUN SERPL-MCNC: 27 MG/DL (ref 8–23)
CALCIUM SERPL-MCNC: 8.4 MG/DL (ref 8.7–10.5)
CHLORIDE SERPL-SCNC: 101 MMOL/L (ref 95–110)
CO2 SERPL-SCNC: 26 MMOL/L (ref 23–29)
CREAT SERPL-MCNC: 1 MG/DL (ref 0.5–1.4)
ERYTHROCYTE [DISTWIDTH] IN BLOOD BY AUTOMATED COUNT: 14 % (ref 11.5–14.5)
EST. GFR  (NO RACE VARIABLE): >60 ML/MIN/1.73 M^2
GLUCOSE SERPL-MCNC: 143 MG/DL (ref 70–110)
HCT VFR BLD AUTO: 22.3 % (ref 40–54)
HGB BLD-MCNC: 7.4 G/DL (ref 14–18)
MAGNESIUM SERPL-MCNC: 1.9 MG/DL (ref 1.6–2.6)
MAGNESIUM SERPL-MCNC: 2.4 MG/DL (ref 1.6–2.6)
MCH RBC QN AUTO: 29.5 PG (ref 27–31)
MCHC RBC AUTO-ENTMCNC: 33.2 G/DL (ref 32–36)
MCV RBC AUTO: 89 FL (ref 82–98)
PHOSPHATE SERPL-MCNC: 3.7 MG/DL (ref 2.7–4.5)
PLATELET # BLD AUTO: 94 K/UL (ref 150–450)
PMV BLD AUTO: 11.5 FL (ref 9.2–12.9)
POCT GLUCOSE: 107 MG/DL (ref 70–110)
POCT GLUCOSE: 90 MG/DL (ref 70–110)
POCT GLUCOSE: 90 MG/DL (ref 70–110)
POTASSIUM SERPL-SCNC: 4.3 MMOL/L (ref 3.5–5.1)
PROT SERPL-MCNC: 4.9 G/DL (ref 6–8.4)
RBC # BLD AUTO: 2.51 M/UL (ref 4.6–6.2)
SODIUM SERPL-SCNC: 138 MMOL/L (ref 136–145)
WBC # BLD AUTO: 10.1 K/UL (ref 3.9–12.7)

## 2023-11-04 PROCEDURE — 97116 GAIT TRAINING THERAPY: CPT

## 2023-11-04 PROCEDURE — 99232 SBSQ HOSP IP/OBS MODERATE 35: CPT | Mod: ,,, | Performed by: INTERNAL MEDICINE

## 2023-11-04 PROCEDURE — 80053 COMPREHEN METABOLIC PANEL: CPT | Performed by: THORACIC SURGERY (CARDIOTHORACIC VASCULAR SURGERY)

## 2023-11-04 PROCEDURE — 85027 COMPLETE CBC AUTOMATED: CPT | Performed by: THORACIC SURGERY (CARDIOTHORACIC VASCULAR SURGERY)

## 2023-11-04 PROCEDURE — 25000003 PHARM REV CODE 250: Performed by: INTERNAL MEDICINE

## 2023-11-04 PROCEDURE — 97530 THERAPEUTIC ACTIVITIES: CPT

## 2023-11-04 PROCEDURE — 83735 ASSAY OF MAGNESIUM: CPT | Mod: 91 | Performed by: THORACIC SURGERY (CARDIOTHORACIC VASCULAR SURGERY)

## 2023-11-04 PROCEDURE — 97166 OT EVAL MOD COMPLEX 45 MIN: CPT

## 2023-11-04 PROCEDURE — 27200667 HC PACEMAKER, TEMPORARY MONITORING, PER SHIFT

## 2023-11-04 PROCEDURE — 21400001 HC TELEMETRY ROOM

## 2023-11-04 PROCEDURE — 36415 COLL VENOUS BLD VENIPUNCTURE: CPT | Performed by: THORACIC SURGERY (CARDIOTHORACIC VASCULAR SURGERY)

## 2023-11-04 PROCEDURE — 99900035 HC TECH TIME PER 15 MIN (STAT)

## 2023-11-04 PROCEDURE — 94799 UNLISTED PULMONARY SVC/PX: CPT

## 2023-11-04 PROCEDURE — 94664 DEMO&/EVAL PT USE INHALER: CPT

## 2023-11-04 PROCEDURE — 84100 ASSAY OF PHOSPHORUS: CPT | Performed by: INTERNAL MEDICINE

## 2023-11-04 PROCEDURE — 99232 PR SUBSEQUENT HOSPITAL CARE,LEVL II: ICD-10-PCS | Mod: ,,, | Performed by: INTERNAL MEDICINE

## 2023-11-04 PROCEDURE — 94761 N-INVAS EAR/PLS OXIMETRY MLT: CPT

## 2023-11-04 PROCEDURE — 97162 PT EVAL MOD COMPLEX 30 MIN: CPT

## 2023-11-04 PROCEDURE — C9113 INJ PANTOPRAZOLE SODIUM, VIA: HCPCS | Performed by: THORACIC SURGERY (CARDIOTHORACIC VASCULAR SURGERY)

## 2023-11-04 PROCEDURE — 27000221 HC OXYGEN, UP TO 24 HOURS

## 2023-11-04 PROCEDURE — 63600175 PHARM REV CODE 636 W HCPCS: Performed by: INTERNAL MEDICINE

## 2023-11-04 PROCEDURE — 63600175 PHARM REV CODE 636 W HCPCS: Performed by: THORACIC SURGERY (CARDIOTHORACIC VASCULAR SURGERY)

## 2023-11-04 PROCEDURE — 25000003 PHARM REV CODE 250: Performed by: THORACIC SURGERY (CARDIOTHORACIC VASCULAR SURGERY)

## 2023-11-04 RX ADMIN — CHLORHEXIDINE GLUCONATE 0.12% ORAL RINSE 10 ML: 1.2 LIQUID ORAL at 09:11

## 2023-11-04 RX ADMIN — FUROSEMIDE 40 MG: 10 INJECTION, SOLUTION INTRAMUSCULAR; INTRAVENOUS at 09:11

## 2023-11-04 RX ADMIN — FERROUS SULFATE TAB 325 MG (65 MG ELEMENTAL FE) 1 EACH: 325 (65 FE) TAB at 09:11

## 2023-11-04 RX ADMIN — AMIODARONE HYDROCHLORIDE 200 MG: 200 TABLET ORAL at 08:11

## 2023-11-04 RX ADMIN — ACETAMINOPHEN 650 MG: 325 TABLET ORAL at 12:11

## 2023-11-04 RX ADMIN — CYANOCOBALAMIN TAB 1000 MCG 1000 MCG: 1000 TAB at 09:11

## 2023-11-04 RX ADMIN — AMIODARONE HYDROCHLORIDE 200 MG: 200 TABLET ORAL at 09:11

## 2023-11-04 RX ADMIN — CLOPIDOGREL BISULFATE 75 MG: 75 TABLET ORAL at 09:11

## 2023-11-04 RX ADMIN — OXYCODONE HYDROCHLORIDE 10 MG: 5 TABLET ORAL at 01:11

## 2023-11-04 RX ADMIN — MAGNESIUM SULFATE HEPTAHYDRATE 4 G: 40 INJECTION, SOLUTION INTRAVENOUS at 06:11

## 2023-11-04 RX ADMIN — DOCUSATE SODIUM 100 MG: 100 CAPSULE, LIQUID FILLED ORAL at 08:11

## 2023-11-04 RX ADMIN — METOPROLOL TARTRATE 25 MG: 25 TABLET, FILM COATED ORAL at 09:11

## 2023-11-04 RX ADMIN — POLYETHYLENE GLYCOL 3350 17 G: 17 POWDER, FOR SOLUTION ORAL at 09:11

## 2023-11-04 RX ADMIN — FUROSEMIDE 40 MG: 10 INJECTION, SOLUTION INTRAMUSCULAR; INTRAVENOUS at 08:11

## 2023-11-04 RX ADMIN — POTASSIUM CHLORIDE 20 MEQ: 1500 TABLET, EXTENDED RELEASE ORAL at 08:11

## 2023-11-04 RX ADMIN — MAGNESIUM HYDROXIDE 400 MG: 400 SUSPENSION ORAL at 09:11

## 2023-11-04 RX ADMIN — PANTOPRAZOLE SODIUM 40 MG: 40 INJECTION, POWDER, FOR SOLUTION INTRAVENOUS at 09:11

## 2023-11-04 RX ADMIN — ATORVASTATIN CALCIUM 80 MG: 40 TABLET, FILM COATED ORAL at 09:11

## 2023-11-04 RX ADMIN — ACETAMINOPHEN 650 MG: 325 TABLET ORAL at 05:11

## 2023-11-04 RX ADMIN — FOLIC ACID 1 MG: 1 TABLET ORAL at 09:11

## 2023-11-04 RX ADMIN — MAGNESIUM HYDROXIDE 400 MG: 400 SUSPENSION ORAL at 08:11

## 2023-11-04 RX ADMIN — OXYCODONE HYDROCHLORIDE AND ACETAMINOPHEN 500 MG: 500 TABLET ORAL at 08:11

## 2023-11-04 RX ADMIN — METOPROLOL TARTRATE 25 MG: 25 TABLET, FILM COATED ORAL at 08:11

## 2023-11-04 RX ADMIN — DOCUSATE SODIUM 100 MG: 100 CAPSULE, LIQUID FILLED ORAL at 09:11

## 2023-11-04 RX ADMIN — ASPIRIN 81 MG: 81 TABLET, COATED ORAL at 09:11

## 2023-11-04 RX ADMIN — POTASSIUM CHLORIDE 20 MEQ: 1500 TABLET, EXTENDED RELEASE ORAL at 09:11

## 2023-11-04 RX ADMIN — ACETAMINOPHEN 650 MG: 325 TABLET ORAL at 11:11

## 2023-11-04 RX ADMIN — OXYCODONE HYDROCHLORIDE AND ACETAMINOPHEN 500 MG: 500 TABLET ORAL at 09:11

## 2023-11-04 RX ADMIN — OXYCODONE HYDROCHLORIDE 10 MG: 5 TABLET ORAL at 04:11

## 2023-11-04 NOTE — PLAN OF CARE
Initial PT eval completed. Patient ambulated 80 feet, Min A with no AD and chair in tow. Recommend HHPT.

## 2023-11-04 NOTE — SUBJECTIVE & OBJECTIVE
Interval History:   The patient is postop day 2 status post coronary artery bypass grafting x3.    ROS  Medications:  Continuous Infusions:   sodium chloride 0.9% Stopped (11/03/23 1940)    dexmedeTOMIDine (Precedex) infusion (titrating) Stopped (11/03/23 0645)    EPINEPHrine Stopped (11/04/23 0328)    sodium bicarbonate 25 mEq in dextrose 5 % (D5W) 1,000 mL Purge Solution for Impella Stopped (11/03/23 1900)    vasopressin Stopped (11/04/23 0459)     Scheduled Meds:   acetaminophen  650 mg Oral Q6H    amiodarone  200 mg Oral BID    ascorbic acid (vitamin C)  500 mg Oral BID    aspirin  81 mg Oral Daily    atorvastatin  80 mg Oral Daily    chlorhexidine  10 mL Mouth/Throat BID    clopidogreL  75 mg Oral Daily    cyanocobalamin  1,000 mcg Oral Daily    docusate sodium  100 mg Oral BID    ferrous sulfate  1 tablet Oral Daily    folic acid  1 mg Oral Daily    furosemide (LASIX) injection  40 mg Intravenous BID    magnesium hydroxide 400 mg/5 ml  5 mL Oral BID    metoprolol tartrate  25 mg Oral BID    pantoprazole  40 mg Intravenous Daily    polyethylene glycol  17 g Oral Daily    potassium chloride  20 mEq Oral Q12H     PRN Meds:acetaminophen, albumin human 5%, calcium gluconate IVPB, calcium gluconate IVPB, calcium gluconate IVPB, ceFAZolin (ANCEF) IVPB, dextrose 10%, dextrose 10%, fentaNYL, fentaNYL, glucagon (human recombinant), glucose, glucose, hydrALAZINE, influenza 65up-adj, insulin aspart U-100, lactated ringers, magnesium sulfate IVPB, magnesium sulfate IVPB, magnesium sulfate IVPB, metoclopramide HCl, nitroGLYCERIN, ondansetron, oxyCODONE, oxyCODONE, potassium chloride in water, potassium chloride in water, potassium chloride in water, sodium chloride 0.9%, sodium chloride 0.9%, sodium phosphate 15 mmol in dextrose 5 % (D5W) 250 mL IVPB, sodium phosphate 20.01 mmol in dextrose 5 % (D5W) 250 mL IVPB, sodium phosphate 30 mmol in dextrose 5 % (D5W) 250 mL IVPB     Objective:     Vital Signs (Most Recent):  Temp:  99.1 °F (37.3 °C) (11/04/23 0545)  Pulse: 89 (11/04/23 0715)  Resp: 20 (11/04/23 0715)  BP: (!) 105/55 (11/04/23 0700)  SpO2: 97 % (11/04/23 0715) Vital Signs (24h Range):  Temp:  [96.8 °F (36 °C)-101.3 °F (38.5 °C)] 99.1 °F (37.3 °C)  Pulse:  [] 89  Resp:  [18-47] 20  SpO2:  [82 %-99 %] 97 %  BP: ()/(53-62) 105/55  Arterial Line BP: ()/(41-69) 96/45     Weight: 99.1 kg (218 lb 7.6 oz)  Body mass index is 27.31 kg/m².    SpO2: 97 %       Intake/Output - Last 3 Shifts         11/02 0700 11/03 0659 11/03 0700 11/04 0659 11/04 0700 11/05 0659    P.O.  140     I.V. (mL/kg) 2998.1 (30.3) 981.1 (9.9)     IV Piggyback 2684 349.7     Total Intake(mL/kg) 5682.1 (57.3) 1470.7 (14.8)     Urine (mL/kg/hr) 1445 (0.6) 3275 (1.4)     Drains 50 10     Stool       Blood 1000      Chest Tube 1240 270     Total Output 3735 3555     Net +1947.1 -2084.3                    Lines/Drains/Airways       Central Venous Catheter Line  Duration             Pulmonary Artery Catheter Assessment  11/02/23 0756 2 days     Introducer with Double Lumen 11/02/23 1400 Internal Jugular Right 1 day              Drain  Duration                  Urethral Catheter 10/28/23 1318 Temperature probe 6 days              Arterial Line  Duration             Arterial Line 11/02/23 0719 Left Radial 2 days              Line  Duration                  Pacer Wires 11/02/23 1400 1 day              Peripheral Intravenous Line  Duration                  Peripheral IV - Single Lumen 11/02/23 0800 16 G Posterior;Proximal;Right Forearm 2 days                     Physical Exam  Constitutional:       Appearance: Normal appearance.   HENT:      Head: Normocephalic and atraumatic.      Nose: Nose normal.   Cardiovascular:      Rate and Rhythm: Normal rate and regular rhythm.      Heart sounds: Normal heart sounds.   Pulmonary:      Effort: Pulmonary effort is normal.      Breath sounds: Normal breath sounds.   Abdominal:      General: Abdomen is flat. Bowel  sounds are normal.      Palpations: Abdomen is soft.   Musculoskeletal:      Right lower leg: Edema present.      Left lower leg: Edema present.   Skin:     General: Skin is warm and dry.   Neurological:      Mental Status: He is alert and oriented to person, place, and time.   Psychiatric:         Behavior: Behavior normal.            Significant Labs:  All pertinent labs from the last 24 hours have been reviewed.    Significant Diagnostics:  I have reviewed all pertinent imaging results/findings within the past 24 hours.

## 2023-11-04 NOTE — PROGRESS NOTES
'[facility] - [facility])  Cardiothoracic Surgery  Progress Note    Patient Name: Carlin Isaac  MRN: [id number]  Admission Date: 10/28/2023  Hospital Length of Stay: 7 days  Code Status: Full Code   Attending Physician: Oscar Cazares MD   Referring Provider: Self, Aaareferral  Principal Problem:Cardiac arrest            Subjective:     Post-Op Info:  Procedure(s) (LRB):  CORONARY ARTERY BYPASS GRAFT (CABG) (N/A)  ECHOCARDIOGRAM,TRANSESOPHAGEAL (N/A)  SURGICAL PROCUREMENT, VEIN, ENDOSCOPIC (Left)  BLOCK, NERVE, INTERCOSTAL, 2 OR MORE (N/A)   2 Days Post-Op     Interval History:   The patient is postop day 2 status post coronary artery bypass grafting x3.    ROS  Medications:  Continuous Infusions:   sodium chloride 0.9% Stopped (11/03/23 1940)    dexmedeTOMIDine (Precedex) infusion (titrating) Stopped (11/03/23 0645)    EPINEPHrine Stopped (11/04/23 0328)    sodium bicarbonate 25 mEq in dextrose 5 % (D5W) 1,000 mL Purge Solution for Impella Stopped (11/03/23 1900)    vasopressin Stopped (11/04/23 0459)     Scheduled Meds:   acetaminophen  650 mg Oral Q6H    amiodarone  200 mg Oral BID    ascorbic acid (vitamin C)  500 mg Oral BID    aspirin  81 mg Oral Daily    atorvastatin  80 mg Oral Daily    chlorhexidine  10 mL Mouth/Throat BID    clopidogreL  75 mg Oral Daily    cyanocobalamin  1,000 mcg Oral Daily    docusate sodium  100 mg Oral BID    ferrous sulfate  1 tablet Oral Daily    folic acid  1 mg Oral Daily    furosemide (LASIX) injection  40 mg Intravenous BID    magnesium hydroxide 400 mg/5 ml  5 mL Oral BID    metoprolol tartrate  25 mg Oral BID    pantoprazole  40 mg Intravenous Daily    polyethylene glycol  17 g Oral Daily    potassium chloride  20 mEq Oral Q12H     PRN Meds:acetaminophen, albumin human 5%, calcium gluconate IVPB, calcium gluconate IVPB, calcium gluconate IVPB, ceFAZolin (ANCEF) IVPB, dextrose 10%, dextrose 10%, fentaNYL, fentaNYL, glucagon (human recombinant), glucose, glucose,  hydrALAZINE, influenza 65up-adj, insulin aspart U-100, lactated ringers, magnesium sulfate IVPB, magnesium sulfate IVPB, magnesium sulfate IVPB, metoclopramide HCl, nitroGLYCERIN, ondansetron, oxyCODONE, oxyCODONE, potassium chloride in water, potassium chloride in water, potassium chloride in water, sodium chloride 0.9%, sodium chloride 0.9%, sodium phosphate 15 mmol in dextrose 5 % (D5W) 250 mL IVPB, sodium phosphate 20.01 mmol in dextrose 5 % (D5W) 250 mL IVPB, sodium phosphate 30 mmol in dextrose 5 % (D5W) 250 mL IVPB     Objective:     Vital Signs (Most Recent):  Temp: 99.1 °F (37.3 °C) (11/04/23 0545)  Pulse: 89 (11/04/23 0715)  Resp: 20 (11/04/23 0715)  BP: (!) 105/55 (11/04/23 0700)  SpO2: 97 % (11/04/23 0715) Vital Signs (24h Range):  Temp:  [96.8 °F (36 °C)-101.3 °F (38.5 °C)] 99.1 °F (37.3 °C)  Pulse:  [] 89  Resp:  [18-47] 20  SpO2:  [82 %-99 %] 97 %  BP: ()/(53-62) 105/55  Arterial Line BP: ()/(41-69) 96/45     Weight: 99.1 kg (218 lb 7.6 oz)  Body mass index is 27.31 kg/m².    SpO2: 97 %       Intake/Output - Last 3 Shifts         11/02 0700 11/03 0659 11/03 0700 11/04 0659 11/04 0700 11/05 0659    P.O.  140     I.V. (mL/kg) 2998.1 (30.3) 981.1 (9.9)     IV Piggyback 2684 349.7     Total Intake(mL/kg) 5682.1 (57.3) 1470.7 (14.8)     Urine (mL/kg/hr) 1445 (0.6) 3275 (1.4)     Drains 50 10     Stool       Blood 1000      Chest Tube 1240 270     Total Output 3735 3555     Net +1947.1 -2084.3                    Lines/Drains/Airways       Central Venous Catheter Line  Duration             Pulmonary Artery Catheter Assessment  11/02/23 0756 2 days     Introducer with Double Lumen 11/02/23 1400 Internal Jugular Right 1 day              Drain  Duration                  Urethral Catheter 10/28/23 1318 Temperature probe 6 days              Arterial Line  Duration             Arterial Line 11/02/23 0719 Left Radial 2 days              Line  Duration                  Pacer Wires 11/02/23 1400  1 day              Peripheral Intravenous Line  Duration                  Peripheral IV - Single Lumen 11/02/23 0800 16 G Posterior;Proximal;Right Forearm 2 days                     Physical Exam  Constitutional:       Appearance: Normal appearance.   HENT:      Head: Normocephalic and atraumatic.      Nose: Nose normal.   Cardiovascular:      Rate and Rhythm: Normal rate and regular rhythm.      Heart sounds: Normal heart sounds.   Pulmonary:      Effort: Pulmonary effort is normal.      Breath sounds: Normal breath sounds.   Abdominal:      General: Abdomen is flat. Bowel sounds are normal.      Palpations: Abdomen is soft.   Musculoskeletal:      Right lower leg: Edema present.      Left lower leg: Edema present.   Skin:     General: Skin is warm and dry.   Neurological:      Mental Status: He is alert and oriented to person, place, and time.   Psychiatric:         Behavior: Behavior normal.            Significant Labs:  All pertinent labs from the last 24 hours have been reviewed.    Significant Diagnostics:  I have reviewed all pertinent imaging results/findings within the past 24 hours.    Assessment/Plan:     * Cardiac arrest  The patient is a 68-year-old male with coronary artery disease who is status post cardiac arrest.  Cardiac catheterization shows severe multivessel coronary artery disease with left main stenosis.  An Impella ventricular assist device was placed in the cath lab.  The patient is a candidate for urgent coronary artery bypass grafting.  The plan is to rest the patient on the Impella over the next 24 hours.  Coronary artery bypass grafting will be scheduled for 11/02/2023.    S/P CABG x 3  11/3/2023  The patient is postop day 1 status post urgent coronary artery bypass grafting x3.  Overall, the patient is doing well.    Neuro:  Patient is awake alert and oriented x3.  Neuro exam is nonfocal.  Patient is requiring p.o. narcotic medication supplemented with p.r.n. IV for pain control.   Patient's source of pain is is chronic back pain.  Patient has minimal incisional pain complaints.    Cardiac:  Patient has been hemodynamically stable.  Cardiac output has been in the excess of 5.5 L per minute.  Epi has been weaned to off.  Impella left ventricular assist device has been removed.  Respiratory:  Patient was extubated yesterday.  Patient having good sats on nasal cannula.  Continue pulmonary toileting.  Continue incentive spirometer.  GI:  Will advance patient's as tolerated.  LFTs that were elevated preoperatively continue to trend downward.  Renal:  Patient has good urine output.  Creatinine is 1.1.  Will start diuresis with Lasix.  Endocrine:  Glucose is controlled with an insulin drip.  Will convert to sliding scale.  Id:  Patient had a T-max of 101.7°.  White count is 14.  Suspect due to stress of surgery.  Will continue to follow.    Heme:  Hematocrit is 26.9 and platelet count is 132.  Will start aspirin and Plavix.  Activities:  Patient's is on bedrest for 3 hours status post Impella removal.  Will advance activities as tolerated.  Line tubes and drains:  Patient has a right IJ [place] and Cordis, Varela catheter, chest tubes, saphenectomy site JOHN drain, A-line and pacer wires    11/04/2023   The patient is postop day 2 status post urgent coronary artery bypass grafting x3.  Overall the patient is doing well.    Patient will be transferred to telemetry.  Neuro:  Patient is awake alert and oriented x3.  Neuro exam is nonfocal.  Patient's pain is better controlled.    Cardiac:  Patient is off all drips.  Blood pressure has been stable.  Continue metoprolol and amiodarone.    Respiratory:  Patient has good sats on nasal cannula.  Continue pulmonary toileting.  Continue incentive spirometer.    GI:  Patient is tolerating a regular diet.  Advance as tolerated.  Renal:  Patient has good urine output.  Creatinine is stable.  Continue diuresis for now.    Endocrine: Glucose is controlled with a sliding  scale.    Id:  Patient is afebrile and white count is 10.  Heme:  Hematocrit is 22 and platelet count is 95.  Patient is asymptomatic.  Continue aspirin and Plavix.    Activities:  Patient is out of bed and has ambulated.  Continue to advance as tolerated.  Line tubes and drains:  Patient has a right IJ [place] and Cordis which will be discontinued.  Varela catheter, A-line and pacer wire.              Oscar Cazares MD  Cardiothoracic Surgery  On license of [facility] - Intensive Care ([facility])

## 2023-11-04 NOTE — ASSESSMENT & PLAN NOTE
11/3/2023  The patient is postop day 1 status post urgent coronary artery bypass grafting x3.  Overall, the patient is doing well.    Neuro:  Patient is awake alert and oriented x3.  Neuro exam is nonfocal.  Patient is requiring p.o. narcotic medication supplemented with p.r.n. IV for pain control.  Patient's source of pain is is chronic back pain.  Patient has minimal incisional pain complaints.    Cardiac:  Patient has been hemodynamically stable.  Cardiac output has been in the excess of 5.5 L per minute.  Epi has been weaned to off.  Impella left ventricular assist device has been removed.  Respiratory:  Patient was extubated yesterday.  Patient having good sats on nasal cannula.  Continue pulmonary toileting.  Continue incentive spirometer.  GI:  Will advance patient's as tolerated.  LFTs that were elevated preoperatively continue to trend downward.  Renal:  Patient has good urine output.  Creatinine is 1.1.  Will start diuresis with Lasix.  Endocrine:  Glucose is controlled with an insulin drip.  Will convert to sliding scale.  Id:  Patient had a T-max of 101.7°.  White count is 14.  Suspect due to stress of surgery.  Will continue to follow.    Heme:  Hematocrit is 26.9 and platelet count is 132.  Will start aspirin and Plavix.  Activities:  Patient's is on bedrest for 3 hours status post Impella removal.  Will advance activities as tolerated.  Line tubes and drains:  Patient has a right IJ [place] and Cordis, Varela catheter, chest tubes, saphenectomy site JOHN drain, A-line and pacer wires    11/04/2023   The patient is postop day 2 status post urgent coronary artery bypass grafting x3.  Overall the patient is doing well.    Neuro:  Patient is awake alert and oriented x3.  Neuro exam is nonfocal.  Patient's pain is better controlled.    Cardiac:  Patient is off all drips.  Blood pressure has been stable.  Continue metoprolol and amiodarone.    Respiratory:  Patient has good sats on nasal cannula.  Continue  pulmonary toileting.  Continue incentive spirometer.    GI:  Patient is tolerating a regular diet.  Advance as tolerated.  Renal:  Patient has good urine output.  Creatinine is stable.  Continue diuresis for now.    Endocrine: Glucose is controlled with a sliding scale.    Id:  Patient is afebrile and white count is 10.  Heme:  Hematocrit is 22 and platelet count is 95.  Patient is asymptomatic.  Continue aspirin and Plavix.    Activities:  Patient is out of bed and has ambulated.  Continue to advance as tolerated.  Line tubes and drains:  Patient has a right IJ [place] and Cordis which will be discontinued.  Varela catheter, A-line and pacer wire.

## 2023-11-04 NOTE — PROGRESS NOTES
[facility] - Intensive Care ([facility])  Cardiology  Progress Note    Patient Name: Carlin Isaac  MRN: [id number]  Admission Date: 10/28/2023  Hospital Length of Stay: 7 days  Code Status: Full Code   Attending Physician: Oscar Cazares MD   Primary Care Physician: Eliot Ambriz MD  Expected Discharge Date:   Principal Problem:Cardiac arrest    Subjective:     Hospital Course:   11/4/23  Pt is three days post-op and sitting up in bed. All vasopressors off. Tele monitoring normal sinus rhythm. Continue  aspirin, Plavix, metoprolol and amiodarone.       ROS  Objective:     Vital Signs (Most Recent):  Temp: 99.1 °F (37.3 °C) (11/04/23 0545)  Pulse: 89 (11/04/23 0715)  Resp: 20 (11/04/23 0715)  BP: (!) 105/55 (11/04/23 0700)  SpO2: 97 % (11/04/23 0715) Vital Signs (24h Range):  Temp:  [96.8 °F (36 °C)-101.3 °F (38.5 °C)] 99.1 °F (37.3 °C)  Pulse:  [] 89  Resp:  [18-47] 20  SpO2:  [82 %-99 %] 97 %  BP: (101-127)/(53-60) 105/55  Arterial Line BP: ()/(41-69) 96/45     Weight: 99.1 kg (218 lb 7.6 oz)  Body mass index is 27.31 kg/m².    SpO2: 97 %         Intake/Output Summary (Last 24 hours) at 11/4/2023 1118  Last data filed at 11/4/2023 0600  Gross per 24 hour   Intake 934.98 ml   Output 3155 ml   Net -2220.02 ml       Lines/Drains/Airways       Central Venous Catheter Line  Duration             Pulmonary Artery Catheter Assessment  11/02/23 0756 2 days     Introducer with Double Lumen 11/02/23 1400 Internal Jugular Right 1 day              Drain  Duration                  Urethral Catheter 10/28/23 1318 Temperature probe 6 days              Arterial Line  Duration             Arterial Line 11/02/23 0719 Left Radial 2 days              Line  Duration                  Pacer Wires 11/02/23 1400 1 day              Peripheral Intravenous Line  Duration                  Peripheral IV - Single Lumen 11/02/23 0800 16 G Posterior;Proximal;Right Forearm 2 days                    Physical Exam    Significant  Labs: All pertinent lab results from the last 24 hours have been reviewed.    Significant Imaging: CT scan: CT ABDOMEN PELVIS WITH CONTRAST: No results found for this visit on 10/28/23. and CT ABDOMEN PELVIS WITHOUT CONTRAST: No results found for this visit on 10/28/23., Echocardiogram: 2D echo with color flow doppler: No results found for this or any previous visit. and Transthoracic echo (TTE) complete (Cupid Only):   Results for orders placed or performed during the hospital encounter of 10/28/23   Echo Saline Bubble? No   Result Value Ref Range    BSA 2.3 m2    RV mid diameter 3.43 cm    Est. RA pres 3 mmHg    Narrative      Left Ventricle: The left ventricle is normal in size. Normal wall   thickness. Normal wall motion. There is normal systolic function with a   visually estimated ejection fraction of 60 - 65%. There is normal   diastolic function.    Right Ventricle: Normal right ventricular cavity size. Wall thickness   is normal. Right ventricle wall motion  is normal. Systolic function is   normal.    IVC/SVC: Normal venous pressure at 3 mmHg.     , and EKG: 10/30/23  Interpretation time: 20:31  Rate: 89 BPM  Rhythm: normal sinus rhythm  Interpretation: Nonspecific ST and T wave abnormality Prolonged QT When compared with ECG of 28-OCT-2023 17:43, Significant changes have occurred.     Assessment and Plan:       Active Diagnoses:    Diagnosis Date Noted POA    PRINCIPAL PROBLEM:  Cardiac arrest [I46.9] 10/28/2023 Yes    S/P CABG x 3 [Z95.1] 11/04/2023 Not Applicable    Left main coronary artery disease [I25.10] 10/28/2023 Unknown    Primary hypertension [I10] 10/28/2023 Yes    Hyperlipidemia [E78.5] 10/28/2023 Yes    Hypogonadism in male [E29.1] 10/28/2023 Yes    Ulcerative proctitis [K51.20] 10/28/2023 Yes      Problems Resolved During this Admission:    Diagnosis Date Noted Date Resolved POA    Non-traumatic rhabdomyolysis [M62.82] 10/29/2023 11/01/2023 Yes    KAREN (acute kidney injury) [N17.9] 10/28/2023  11/02/2023 Yes    Acute hypercapnic respiratory failure [J96.02] 10/28/2023 11/01/2023 Yes       VTE Risk Mitigation (From admission, onward)           Ordered     IP VTE LOW RISK PATIENT  Once         11/01/23 1945     Place JUAN PABLO hose  Until discontinued         11/01/23 1945     heparin 25,000 units in dextrose 5% (100 units/ml) IV bolus from bag - ADDITIONAL PRN BOLUS - 30 units/kg (max bolus 4000 units)  As needed (PRN)        Question:  Heparin Infusion Adjustment (DO NOT MODIFY ANSWER)  Answer:  \\[email or web address]\epic\Images\Pharmacy\HeparinInfusions\heparin LOW INTENSITY nomogram for [facility] [id number]    10/31/23 0001     heparin 25,000 units in dextrose 5% (100 units/ml) IV bolus from bag - ADDITIONAL PRN BOLUS - 60 units/kg (max bolus 4000 units)  As needed (PRN)        Question:  Heparin Infusion Adjustment (DO NOT MODIFY ANSWER)  Answer:  \[email or web address]\epic\Images\Pharmacy\HeparinInfusions\heparin LOW INTENSITY nomogram for [facility] [id number]    10/30/23 2212     heparin 25,000 units in dextrose 5% 250 mL (100 units/mL) infusion LOW INTENSITY nomogram - [facility]  Continuous        Question:  Begin at (units/kg/hr)  Answer:  12    10/30/23 2212     Place sequential compression device  Until discontinued         10/28/23 1229                    Ila Graham  Cardiology  '[place] - Intensive Care ([facility])

## 2023-11-04 NOTE — ASSESSMENT & PLAN NOTE
11/03/2023   The patient is postop day 1 status post urgent coronary artery bypass grafting x3.  Overall, the patient is doing well.    Neuro:  Patient is awake alert and oriented x3.  Neuro exam is nonfocal.  Patient is requiring p.o. narcotic medication supplemented with p.r.n. IV for pain control.  Patient's source of pain is is chronic back pain.  Patient has minimal incisional pain complaints.    Cardiac:  Patient has been hemodynamically stable.  Cardiac output has been in the excess of 5.5 L per minute.  Epi has been weaned to off.  Impella left ventricular assist device has been removed.  Respiratory:  Patient was extubated yesterday.  Patient having good sats on nasal cannula.  Continue pulmonary toileting.  Continue incentive spirometer.  GI:  Will advance patient's as tolerated.  LFTs that were elevated preoperatively continue to trend downward.  Renal:  Patient has good urine output.  Creatinine is 1.1.  Will start diuresis with Lasix.  Endocrine:  Glucose is controlled with an insulin drip.  Will convert to sliding scale.  Id:  Patient had a T-max of 101.7°.  White count is 14.  Suspect due to stress of surgery.  Will continue to follow.    Heme:  Hematocrit is 26.9 and platelet count is 132.  Will start aspirin and Plavix.  Activities:  Patient's is on bedrest for 3 hours status post Impella removal.  Will advance activities as tolerated.  Line tubes and drains:  Patient has a right IJ [place] and Cordis, Varela catheter, chest tubes, saphenectomy site JOHN drain, A-line and pacer wires

## 2023-11-04 NOTE — PT/OT/SLP EVAL
"Physical Therapy Evaluation    Patient Name:  Carlin Isaac   MRN:  [id number]    Recommendations:     Discharge Recommendations: Low Intensity Therapy   Discharge Equipment Recommendations: shower chair   Barriers to discharge: None    Assessment:     Carlin Isaac is a 68 y.o. male admitted with a medical diagnosis of Cardiac arrest.  He presents with the following impairments/functional limitations: weakness, impaired endurance, decreased ROM, decreased coordination, impaired self care skills, decreased upper extremity function, impaired functional mobility, decreased lower extremity function, gait instability, decreased safety awareness, impaired balance, impaired cardiopulmonary response to activity.    Rehab Prognosis: Good; patient would benefit from acute skilled PT services to address these deficits and reach maximum level of function.    Recent Surgery: Procedure(s) (LRB):  CORONARY ARTERY BYPASS GRAFT (CABG) (N/A)  ECHOCARDIOGRAM,TRANSESOPHAGEAL (N/A)  SURGICAL PROCUREMENT, VEIN, ENDOSCOPIC (Left)  BLOCK, NERVE, INTERCOSTAL, 2 OR MORE (N/A) 2 Days Post-Op    Plan:     During this hospitalization, patient to be seen 3 x/week to address the identified rehab impairments via gait training, therapeutic activities, therapeutic exercises and progress toward the following goals:    Plan of Care Expires:  11/18/23    Subjective     Chief Complaint: "I'm doing okay."  Patient/Family Comments/goals: Get better and return home.  Pain/Comfort:  Pain Rating 1: 0/10    Patients cultural, spiritual, Mandaen conflicts given the current situation: no    Living Environment:    Patient lives in a 3rd floor apartment building with his significant other. Apartment has elevator and step access with bilateral hand rails.   Prior to admission, patients level of function was independent, retired and does not drive.  Equipment used at home: none.  DME owned (not currently used): none.  Upon discharge, patient will have " assistance from unknown.    Objective:     Communicated with JOESPH Bowie prior to session.  Patient found up in chair with arterial line, blood pressure cuff, telemetry, central line, chair check, ayala catheter, external pacer, wound vac, oxygen  upon PT entry to room.    General Precautions: Standard, [place] catheter (PA cordis), sternal, fall, respiratory  Orthopedic Precautions:N/A   Braces: N/A  Respiratory Status: Nasal cannula, flow 3 L/min    Exams:  Cognitive Exam:  Patient is oriented to Person, Place, Time, and Situation  RLE ROM: WFL  RLE Strength: WFL  LLE ROM: WFL  LLE Strength: WFL    Functional Mobility:  Transfers:     Sit to Stand:  moderate assistance and of 2 persons with no AD  Gait: 80 feet, Min A with no AD      AM-PAC 6 CLICK MOBILITY  Total Score:9       Treatment & Education:    Patient found seated in bedside chair upon PT entrance into room. PT provided education on role of PT, POC and education on sternal precautions.    Patient completed:     STS: Mod A x2 while holding heart pillow. Verbal cues provided to scoot bottom to edge of chair to assist with ease of transfer. Patient compliant with sternal precautions for transfer to standing.     Gait: 80 feet, Min A with HHA. Patient ambulated with decreased jovita but no LOB with chair in tow.     Stand>sit: Min A     Therex: Education provided on BLE therex to complete in the chair throughout the day- ankle pumps, marching, LAQ    Patient left up in chair with all lines intact, call button in reach, chair alarm on, and RN present.    GOALS:   Multidisciplinary Problems       Physical Therapy Goals          Problem: Physical Therapy    Goal Priority Disciplines Outcome Goal Variances Interventions   Physical Therapy Goal     PT, PT/OT      Description: Patient will be seen a minimal of 3 out of 7 days a week.    LTG to be met by 11/18/23:    Bed mobility: SPV  Transfers: SPV  Gait: SPV x200 feet                       History:     Past  Medical History:   Diagnosis Date    Kidney stone        Past Surgical History:   Procedure Laterality Date    CORONARY ARTERY BYPASS GRAFT (CABG) N/A 11/2/2023    Procedure: CORONARY ARTERY BYPASS GRAFT (CABG);  Surgeon: Oscar Cazares MD;  Location: [facility];  Service: Cardiothoracic;  Laterality: N/A;  3-VESSEL WITH EPI-AORTIC ULTRASOUND    ECHOCARDIOGRAM,TRANSESOPHAGEAL N/A 11/2/2023    Procedure: ECHOCARDIOGRAM,TRANSESOPHAGEAL;  Surgeon: Oscar Cazares MD;  Location: [facility];  Service: Cardiothoracic;  Laterality: N/A;    ENDOSCOPIC HARVEST OF VEIN Left 11/2/2023    Procedure: SURGICAL PROCUREMENT, VEIN, ENDOSCOPIC;  Surgeon: Oscar Cazares MD;  Location: [facility];  Service: Cardiothoracic;  Laterality: Left;    INJECTION OF ANESTHETIC AGENT AROUND MULTIPLE INTERCOSTAL NERVES N/A 11/2/2023    Procedure: BLOCK, NERVE, INTERCOSTAL, 2 OR MORE;  Surgeon: Oscar Cazares MD;  Location: [facility];  Service: Cardiothoracic;  Laterality: N/A;  PARA-STERNALNERVE BLOCK    INSERTION OF INTRAVASCULAR MICROAXIAL BLOOD PUMP N/A 10/31/2023    Procedure: INSERTION, IMPELLA;  Surgeon: Baldev Mathew MD;  Location: [facility];  Service: Cardiology;  Laterality: N/A;    LEFT HEART CATHETERIZATION Left 10/31/2023    Procedure: Left heart cath, with possible Impella;  Surgeon: Baldev Mathew MD;  Location: [facility];  Service: Cardiology;  Laterality: Left;       Time Tracking:     PT Received On: 11/04/23  PT Start Time: 0700     PT Stop Time: 0725  PT Total Time (min): 25 min     Billable Minutes: Evaluation 12 and Gait Training 13      11/04/2023

## 2023-11-05 LAB
ALBUMIN SERPL BCP-MCNC: 2.8 G/DL (ref 3.5–5.2)
ALP SERPL-CCNC: 46 U/L (ref 55–135)
ALT SERPL W/O P-5'-P-CCNC: 55 U/L (ref 10–44)
ANION GAP SERPL CALC-SCNC: 8 MMOL/L (ref 8–16)
AST SERPL-CCNC: 47 U/L (ref 10–40)
BILIRUB SERPL-MCNC: 0.4 MG/DL (ref 0.1–1)
BLD PROD TYP BPU: NORMAL
BLD PROD TYP BPU: NORMAL
BLOOD UNIT EXPIRATION DATE: NORMAL
BLOOD UNIT EXPIRATION DATE: NORMAL
BLOOD UNIT TYPE CODE: 6200
BLOOD UNIT TYPE CODE: 6200
BLOOD UNIT TYPE: NORMAL
BLOOD UNIT TYPE: NORMAL
BUN SERPL-MCNC: 30 MG/DL (ref 8–23)
CALCIUM SERPL-MCNC: 8.1 MG/DL (ref 8.7–10.5)
CHLORIDE SERPL-SCNC: 98 MMOL/L (ref 95–110)
CO2 SERPL-SCNC: 31 MMOL/L (ref 23–29)
CODING SYSTEM: NORMAL
CODING SYSTEM: NORMAL
CREAT SERPL-MCNC: 1 MG/DL (ref 0.5–1.4)
CROSSMATCH INTERPRETATION: NORMAL
CROSSMATCH INTERPRETATION: NORMAL
DISPENSE STATUS: NORMAL
DISPENSE STATUS: NORMAL
ERYTHROCYTE [DISTWIDTH] IN BLOOD BY AUTOMATED COUNT: 14 % (ref 11.5–14.5)
EST. GFR  (NO RACE VARIABLE): >60 ML/MIN/1.73 M^2
GLUCOSE SERPL-MCNC: 114 MG/DL (ref 70–110)
HCT VFR BLD AUTO: 23.3 % (ref 40–54)
HGB BLD-MCNC: 7.7 G/DL (ref 14–18)
MAGNESIUM SERPL-MCNC: 2 MG/DL (ref 1.6–2.6)
MAGNESIUM SERPL-MCNC: 2.5 MG/DL (ref 1.6–2.6)
MCH RBC QN AUTO: 29.8 PG (ref 27–31)
MCHC RBC AUTO-ENTMCNC: 33 G/DL (ref 32–36)
MCV RBC AUTO: 90 FL (ref 82–98)
NUM UNITS TRANS PACKED RBC: NORMAL
NUM UNITS TRANS PACKED RBC: NORMAL
PHOSPHATE SERPL-MCNC: 3.4 MG/DL (ref 2.7–4.5)
PLATELET # BLD AUTO: 157 K/UL (ref 150–450)
PLATELET BLD QL SMEAR: NORMAL
PMV BLD AUTO: 11.2 FL (ref 9.2–12.9)
POCT GLUCOSE: 109 MG/DL (ref 70–110)
POCT GLUCOSE: 123 MG/DL (ref 70–110)
POCT GLUCOSE: 99 MG/DL (ref 70–110)
POTASSIUM SERPL-SCNC: 4.1 MMOL/L (ref 3.5–5.1)
PROT SERPL-MCNC: 5.1 G/DL (ref 6–8.4)
RBC # BLD AUTO: 2.58 M/UL (ref 4.6–6.2)
SODIUM SERPL-SCNC: 137 MMOL/L (ref 136–145)
WBC # BLD AUTO: 11.17 K/UL (ref 3.9–12.7)

## 2023-11-05 PROCEDURE — 63600175 PHARM REV CODE 636 W HCPCS: Performed by: THORACIC SURGERY (CARDIOTHORACIC VASCULAR SURGERY)

## 2023-11-05 PROCEDURE — 99232 SBSQ HOSP IP/OBS MODERATE 35: CPT | Mod: ,,, | Performed by: INTERNAL MEDICINE

## 2023-11-05 PROCEDURE — 25000003 PHARM REV CODE 250: Performed by: INTERNAL MEDICINE

## 2023-11-05 PROCEDURE — 27000646 HC AEROBIKA DEVICE

## 2023-11-05 PROCEDURE — 94664 DEMO&/EVAL PT USE INHALER: CPT

## 2023-11-05 PROCEDURE — 25000003 PHARM REV CODE 250: Performed by: THORACIC SURGERY (CARDIOTHORACIC VASCULAR SURGERY)

## 2023-11-05 PROCEDURE — 94761 N-INVAS EAR/PLS OXIMETRY MLT: CPT

## 2023-11-05 PROCEDURE — 83735 ASSAY OF MAGNESIUM: CPT | Performed by: THORACIC SURGERY (CARDIOTHORACIC VASCULAR SURGERY)

## 2023-11-05 PROCEDURE — 99232 PR SUBSEQUENT HOSPITAL CARE,LEVL II: ICD-10-PCS | Mod: ,,, | Performed by: INTERNAL MEDICINE

## 2023-11-05 PROCEDURE — 99900035 HC TECH TIME PER 15 MIN (STAT)

## 2023-11-05 PROCEDURE — 94799 UNLISTED PULMONARY SVC/PX: CPT

## 2023-11-05 PROCEDURE — 27200667 HC PACEMAKER, TEMPORARY MONITORING, PER SHIFT

## 2023-11-05 PROCEDURE — 84100 ASSAY OF PHOSPHORUS: CPT | Performed by: INTERNAL MEDICINE

## 2023-11-05 PROCEDURE — 21400001 HC TELEMETRY ROOM

## 2023-11-05 PROCEDURE — 27000221 HC OXYGEN, UP TO 24 HOURS

## 2023-11-05 PROCEDURE — 36415 COLL VENOUS BLD VENIPUNCTURE: CPT | Performed by: THORACIC SURGERY (CARDIOTHORACIC VASCULAR SURGERY)

## 2023-11-05 PROCEDURE — 80053 COMPREHEN METABOLIC PANEL: CPT | Performed by: THORACIC SURGERY (CARDIOTHORACIC VASCULAR SURGERY)

## 2023-11-05 PROCEDURE — C9113 INJ PANTOPRAZOLE SODIUM, VIA: HCPCS | Performed by: THORACIC SURGERY (CARDIOTHORACIC VASCULAR SURGERY)

## 2023-11-05 PROCEDURE — 85027 COMPLETE CBC AUTOMATED: CPT | Performed by: THORACIC SURGERY (CARDIOTHORACIC VASCULAR SURGERY)

## 2023-11-05 RX ORDER — TRAMADOL HYDROCHLORIDE 50 MG/1
50 TABLET ORAL EVERY 4 HOURS PRN
Status: DISCONTINUED | OUTPATIENT
Start: 2023-11-05 | End: 2023-11-07 | Stop reason: HOSPADM

## 2023-11-05 RX ADMIN — CHLORHEXIDINE GLUCONATE 0.12% ORAL RINSE 10 ML: 1.2 LIQUID ORAL at 08:11

## 2023-11-05 RX ADMIN — METOPROLOL TARTRATE 25 MG: 25 TABLET, FILM COATED ORAL at 08:11

## 2023-11-05 RX ADMIN — ACETAMINOPHEN 650 MG: 325 TABLET ORAL at 05:11

## 2023-11-05 RX ADMIN — CLOPIDOGREL BISULFATE 75 MG: 75 TABLET ORAL at 08:11

## 2023-11-05 RX ADMIN — PANTOPRAZOLE SODIUM 40 MG: 40 INJECTION, POWDER, FOR SOLUTION INTRAVENOUS at 08:11

## 2023-11-05 RX ADMIN — CYANOCOBALAMIN TAB 1000 MCG 1000 MCG: 1000 TAB at 08:11

## 2023-11-05 RX ADMIN — MAGNESIUM HYDROXIDE 400 MG: 400 SUSPENSION ORAL at 08:11

## 2023-11-05 RX ADMIN — OXYCODONE HYDROCHLORIDE AND ACETAMINOPHEN 500 MG: 500 TABLET ORAL at 08:11

## 2023-11-05 RX ADMIN — ASPIRIN 81 MG: 81 TABLET, COATED ORAL at 08:11

## 2023-11-05 RX ADMIN — POLYETHYLENE GLYCOL 3350 17 G: 17 POWDER, FOR SOLUTION ORAL at 08:11

## 2023-11-05 RX ADMIN — AMIODARONE HYDROCHLORIDE 200 MG: 200 TABLET ORAL at 08:11

## 2023-11-05 RX ADMIN — ACETAMINOPHEN 650 MG: 325 TABLET ORAL at 11:11

## 2023-11-05 RX ADMIN — FERROUS SULFATE TAB 325 MG (65 MG ELEMENTAL FE) 1 EACH: 325 (65 FE) TAB at 08:11

## 2023-11-05 RX ADMIN — ATORVASTATIN CALCIUM 80 MG: 40 TABLET, FILM COATED ORAL at 08:11

## 2023-11-05 RX ADMIN — MAGNESIUM SULFATE HEPTAHYDRATE 4 G: 40 INJECTION, SOLUTION INTRAVENOUS at 08:11

## 2023-11-05 RX ADMIN — TRAMADOL HYDROCHLORIDE 50 MG: 50 TABLET, COATED ORAL at 08:11

## 2023-11-05 RX ADMIN — FOLIC ACID 1 MG: 1 TABLET ORAL at 08:11

## 2023-11-05 RX ADMIN — DOCUSATE SODIUM 100 MG: 100 CAPSULE, LIQUID FILLED ORAL at 08:11

## 2023-11-05 NOTE — SUBJECTIVE & OBJECTIVE
Interval History:  The patient is postop day 3 status post urgent coronary artery bypass grafting x3.    ROS  Medications:  Continuous Infusions:   sodium chloride 0.9% Stopped (11/03/23 1940)    dexmedeTOMIDine (Precedex) infusion (titrating) Stopped (11/03/23 0645)    EPINEPHrine Stopped (11/04/23 0328)    sodium bicarbonate 25 mEq in dextrose 5 % (D5W) 1,000 mL Purge Solution for Impella Stopped (11/03/23 1900)    vasopressin Stopped (11/04/23 0459)     Scheduled Meds:   acetaminophen  650 mg Oral Q6H    amiodarone  200 mg Oral BID    ascorbic acid (vitamin C)  500 mg Oral BID    aspirin  81 mg Oral Daily    atorvastatin  80 mg Oral Daily    chlorhexidine  10 mL Mouth/Throat BID    clopidogreL  75 mg Oral Daily    cyanocobalamin  1,000 mcg Oral Daily    docusate sodium  100 mg Oral BID    ferrous sulfate  1 tablet Oral Daily    folic acid  1 mg Oral Daily    furosemide (LASIX) injection  40 mg Intravenous BID    magnesium hydroxide 400 mg/5 ml  5 mL Oral BID    metoprolol tartrate  25 mg Oral BID    pantoprazole  40 mg Intravenous Daily    polyethylene glycol  17 g Oral Daily    potassium chloride  20 mEq Oral Q12H     PRN Meds:acetaminophen, albumin human 5%, calcium gluconate IVPB, calcium gluconate IVPB, calcium gluconate IVPB, ceFAZolin (ANCEF) IVPB, dextrose 10%, dextrose 10%, fentaNYL, fentaNYL, glucagon (human recombinant), glucose, glucose, hydrALAZINE, influenza 65up-adj, insulin aspart U-100, lactated ringers, magnesium sulfate IVPB, magnesium sulfate IVPB, magnesium sulfate IVPB, metoclopramide HCl, nitroGLYCERIN, ondansetron, oxyCODONE, oxyCODONE, potassium chloride in water, potassium chloride in water, potassium chloride in water, sodium chloride 0.9%, sodium chloride 0.9%, sodium phosphate 15 mmol in dextrose 5 % (D5W) 250 mL IVPB, sodium phosphate 20.01 mmol in dextrose 5 % (D5W) 250 mL IVPB, sodium phosphate 30 mmol in dextrose 5 % (D5W) 250 mL IVPB     Objective:     Vital Signs (Most  Recent):  Temp: 97.8 °F (36.6 °C) (11/05/23 0710)  Pulse: 89 (11/05/23 0710)  Resp: 18 (11/05/23 0710)  BP: 107/65 (11/05/23 0710)  SpO2: 99 % (11/05/23 0710) Vital Signs (24h Range):  Temp:  [97.8 °F (36.6 °C)-98.7 °F (37.1 °C)] 97.8 °F (36.6 °C)  Pulse:  [89-90] 89  Resp:  [16-32] 18  SpO2:  [94 %-100 %] 99 %  BP: (102-130)/(55-65) 107/65  Arterial Line BP: (112)/(40) 112/40     Weight: 99.1 kg (218 lb 7.6 oz)  Body mass index is 27.31 kg/m².    SpO2: 99 %       Intake/Output - Last 3 Shifts         11/03 0700 11/04 0659 11/04 0700 11/05 0659 11/05 0700 11/06 0659    P.O. 140  200    I.V. (mL/kg) 981.1 (9.9) 39.1 (0.4)     IV Piggyback 349.7      Total Intake(mL/kg) 1470.7 (14.8) 39.1 (0.4) 200 (2)    Urine (mL/kg/hr) 3275 (1.4) 2995 (1.3)     Drains 10      Blood       Chest Tube 270      Total Output 3555 2995     Net -2084.3 -2956 +200                   Lines/Drains/Airways       Line  Duration                  Pacer Wires 11/02/23 1400 2 days              Peripheral Intravenous Line  Duration                  Peripheral IV - Single Lumen 11/04/23 1501 20 G Anterior;Left Upper Arm <1 day         Peripheral IV - Single Lumen 11/04/23 1501 20 G Left Antecubital <1 day                     Physical Exam  Constitutional:       Appearance: Normal appearance.   HENT:      Head: Normocephalic and atraumatic.      Nose: Nose normal.   Cardiovascular:      Rate and Rhythm: Normal rate and regular rhythm.      Heart sounds: Normal heart sounds.   Pulmonary:      Effort: Pulmonary effort is normal.      Breath sounds: Normal breath sounds.   Abdominal:      General: Abdomen is flat. Bowel sounds are normal.      Palpations: Abdomen is soft.   Musculoskeletal:      Right lower leg: No edema.      Left lower leg: No edema.   Skin:     General: Skin is warm and dry.   Neurological:      Mental Status: He is alert and oriented to person, place, and time.   Psychiatric:         Behavior: Behavior normal.             Significant Labs:  All pertinent labs from the last 24 hours have been reviewed.    Significant Diagnostics:  I have reviewed all pertinent imaging results/findings within the past 24 hours.

## 2023-11-05 NOTE — PROGRESS NOTES
'[place] [facility])  Cardiothoracic Surgery  Progress Note    Patient Name: Carlin Isaac  MRN: [id number]  Admission Date: 10/28/2023  Hospital Length of Stay: 8 days  Code Status: Full Code   Attending Physician: Oscar Cazares MD   Referring Provider: Self, Aaareferral  Principal Problem:Cardiac arrest            Subjective:     Post-Op Info:  Procedure(s) (LRB):  CORONARY ARTERY BYPASS GRAFT (CABG) (N/A)  ECHOCARDIOGRAM,TRANSESOPHAGEAL (N/A)  SURGICAL PROCUREMENT, VEIN, ENDOSCOPIC (Left)  BLOCK, NERVE, INTERCOSTAL, 2 OR MORE (N/A)   3 Days Post-Op     Interval History:  The patient is postop day 3 status post urgent coronary artery bypass grafting x3.    ROS  Medications:  Continuous Infusions:   sodium chloride 0.9% Stopped (11/03/23 1940)    dexmedeTOMIDine (Precedex) infusion (titrating) Stopped (11/03/23 0645)    EPINEPHrine Stopped (11/04/23 0328)    sodium bicarbonate 25 mEq in dextrose 5 % (D5W) 1,000 mL Purge Solution for Impella Stopped (11/03/23 1900)    vasopressin Stopped (11/04/23 0459)     Scheduled Meds:   acetaminophen  650 mg Oral Q6H    amiodarone  200 mg Oral BID    ascorbic acid (vitamin C)  500 mg Oral BID    aspirin  81 mg Oral Daily    atorvastatin  80 mg Oral Daily    chlorhexidine  10 mL Mouth/Throat BID    clopidogreL  75 mg Oral Daily    cyanocobalamin  1,000 mcg Oral Daily    docusate sodium  100 mg Oral BID    ferrous sulfate  1 tablet Oral Daily    folic acid  1 mg Oral Daily    furosemide (LASIX) injection  40 mg Intravenous BID    magnesium hydroxide 400 mg/5 ml  5 mL Oral BID    metoprolol tartrate  25 mg Oral BID    pantoprazole  40 mg Intravenous Daily    polyethylene glycol  17 g Oral Daily    potassium chloride  20 mEq Oral Q12H     PRN Meds:acetaminophen, albumin human 5%, calcium gluconate IVPB, calcium gluconate IVPB, calcium gluconate IVPB, ceFAZolin (ANCEF) IVPB, dextrose 10%, dextrose 10%, fentaNYL, fentaNYL, glucagon (human  recombinant), glucose, glucose, hydrALAZINE, influenza 65up-adj, insulin aspart U-100, lactated ringers, magnesium sulfate IVPB, magnesium sulfate IVPB, magnesium sulfate IVPB, metoclopramide HCl, nitroGLYCERIN, ondansetron, oxyCODONE, oxyCODONE, potassium chloride in water, potassium chloride in water, potassium chloride in water, sodium chloride 0.9%, sodium chloride 0.9%, sodium phosphate 15 mmol in dextrose 5 % (D5W) 250 mL IVPB, sodium phosphate 20.01 mmol in dextrose 5 % (D5W) 250 mL IVPB, sodium phosphate 30 mmol in dextrose 5 % (D5W) 250 mL IVPB     Objective:     Vital Signs (Most Recent):  Temp: 97.8 °F (36.6 °C) (11/05/23 0710)  Pulse: 89 (11/05/23 0710)  Resp: 18 (11/05/23 0710)  BP: 107/65 (11/05/23 0710)  SpO2: 99 % (11/05/23 0710) Vital Signs (24h Range):  Temp:  [97.8 °F (36.6 °C)-98.7 °F (37.1 °C)] 97.8 °F (36.6 °C)  Pulse:  [89-90] 89  Resp:  [16-32] 18  SpO2:  [94 %-100 %] 99 %  BP: (102-130)/(55-65) 107/65  Arterial Line BP: (112)/(40) 112/40     Weight: 99.1 kg (218 lb 7.6 oz)  Body mass index is 27.31 kg/m².    SpO2: 99 %       Intake/Output - Last 3 Shifts         11/03 0700 11/04 0659 11/04 0700 11/05 0659 11/05 0700 11/06 0659    P.O. 140  200    I.V. (mL/kg) 981.1 (9.9) 39.1 (0.4)     IV Piggyback 349.7      Total Intake(mL/kg) 1470.7 (14.8) 39.1 (0.4) 200 (2)    Urine (mL/kg/hr) 3275 (1.4) 2995 (1.3)     Drains 10      Blood       Chest Tube 270      Total Output 3555 2995     Net -2084.3 -2956 +200                   Lines/Drains/Airways       Line  Duration                  Pacer Wires 11/02/23 1400 2 days              Peripheral Intravenous Line  Duration                  Peripheral IV - Single Lumen 11/04/23 1501 20 G Anterior;Left Upper Arm <1 day         Peripheral IV - Single Lumen 11/04/23 1501 20 G Left Antecubital <1 day                     Physical Exam  Constitutional:       Appearance: Normal appearance.   HENT:      Head: Normocephalic and atraumatic.      Nose: Nose  normal.   Cardiovascular:      Rate and Rhythm: Normal rate and regular rhythm.      Heart sounds: Normal heart sounds.   Pulmonary:      Effort: Pulmonary effort is normal.      Breath sounds: Normal breath sounds.   Abdominal:      General: Abdomen is flat. Bowel sounds are normal.      Palpations: Abdomen is soft.   Musculoskeletal:      Right lower leg: No edema.      Left lower leg: No edema.   Skin:     General: Skin is warm and dry.   Neurological:      Mental Status: He is alert and oriented to person, place, and time.   Psychiatric:         Behavior: Behavior normal.            Significant Labs:  All pertinent labs from the last 24 hours have been reviewed.    Significant Diagnostics:  I have reviewed all pertinent imaging results/findings within the past 24 hours.    Assessment/Plan:     * Cardiac arrest  The patient is a 68-year-old male with coronary artery disease who is status post cardiac arrest.  Cardiac catheterization shows severe multivessel coronary artery disease with left main stenosis.  An Impella ventricular assist device was placed in the cath lab.  The patient is a candidate for urgent coronary artery bypass grafting.  The plan is to rest the patient on the Impella over the next 24 hours.  Coronary artery bypass grafting will be scheduled for 11/02/2023.    S/P CABG x 3  11/3/2023  The patient is postop day 1 status post urgent coronary artery bypass grafting x3.  Overall, the patient is doing well.    Neuro:  Patient is awake alert and oriented x3.  Neuro exam is nonfocal.  Patient is requiring p.o. narcotic medication supplemented with p.r.n. IV for pain control.  Patient's source of pain is is chronic back pain.  Patient has minimal incisional pain complaints.    Cardiac:  Patient has been hemodynamically stable.  Cardiac output has been in the excess of 5.5 L per minute.  Epi has been weaned to off.  Impella left ventricular assist device has been removed.  Respiratory:  Patient was  extubated yesterday.  Patient having good sats on nasal cannula.  Continue pulmonary toileting.  Continue incentive spirometer.  GI:  Will advance patient's as tolerated.  LFTs that were elevated preoperatively continue to trend downward.  Renal:  Patient has good urine output.  Creatinine is 1.1.  Will start diuresis with Lasix.  Endocrine:  Glucose is controlled with an insulin drip.  Will convert to sliding scale.  Id:  Patient had a T-max of 101.7°.  White count is 14.  Suspect due to stress of surgery.  Will continue to follow.    Heme:  Hematocrit is 26.9 and platelet count is 132.  Will start aspirin and Plavix.  Activities:  Patient's is on bedrest for 3 hours status post Impella removal.  Will advance activities as tolerated.  Line tubes and drains:  Patient has a right IJ [place] and Cordis, Varela catheter, chest tubes, saphenectomy site JOHN drain, A-line and pacer wires    11/04/2023   The patient is postop day 2 status post urgent coronary artery bypass grafting x3.  Overall the patient is doing well.    Neuro:  Patient is awake alert and oriented x3.  Neuro exam is nonfocal.  Patient's pain is better controlled.    Cardiac:  Patient is off all drips.  Blood pressure has been stable.  Continue metoprolol and amiodarone.    Respiratory:  Patient has good sats on nasal cannula.  Continue pulmonary toileting.  Continue incentive spirometer.    GI:  Patient is tolerating a regular diet.  Advance as tolerated.  Renal:  Patient has good urine output.  Creatinine is stable.  Continue diuresis for now.    Endocrine: Glucose is controlled with a sliding scale.    Id:  Patient is afebrile and white count is 10.  Heme:  Hematocrit is 22 and platelet count is 95.  Patient is asymptomatic.  Continue aspirin and Plavix.    Activities:  Patient is out of bed and has ambulated.  Continue to advance as tolerated.  Line tubes and drains:  Patient has a right IJ [place] and Cordis which will be discontinued.  Varela catheter,  A-line and pacer wire.    11/05/2023   The patient is postop day 3 status post urgent coronary artery bypass grafting x3.  Overall the patient is doing well.  Anticipate discharge home with home health in the next 48 hours.    Neuro:  Patient is awake alert and oriented x3.  Neuro exam is nonfocal.  Pain is much better controlled.  Will deescalate pain control to tramadol.    Cardiac:  Patient blood pressure has been stable continue metoprolol.  Respiratory:  Patient has good sats on room air.  Continue pulmonary toileting.  Continue incentive spirometer.    GI:  Patient is tolerating a regular diet.    Renal:  Patient has good urine output.  Creatinine is stable at 1.0.  Magnesium is 2.0 and being replaced replaced.  Patient appears euvolemic.  Will discontinue Lasix.  Endocrine:  Glucose is well controlled.    Id:  Patient is afebrile and white count is 11.    Heme:  Hematocrit is 23 and platelet count is 157.  Continue aspirin and Plavix.    Activities: Patient is out of bed and ambulating.  Continue to advance as tolerated.  Line tubes and drains patient has pacer wires.    Left main coronary artery disease  11/03/2023   The patient is postop day 1 status post urgent coronary artery bypass grafting x3.  Overall, the patient is doing well.    Neuro:  Patient is awake alert and oriented x3.  Neuro exam is nonfocal.  Patient is requiring p.o. narcotic medication supplemented with p.r.n. IV for pain control.  Patient's source of pain is is chronic back pain.  Patient has minimal incisional pain complaints.    Cardiac:  Patient has been hemodynamically stable.  Cardiac output has been in the excess of 5.5 L per minute.  Epi has been weaned to off.  Impella left ventricular assist device has been removed.  Respiratory:  Patient was extubated yesterday.  Patient having good sats on nasal cannula.  Continue pulmonary toileting.  Continue incentive spirometer.  GI:  Will advance patient's as tolerated.  LFTs that were  elevated preoperatively continue to trend downward.  Renal:  Patient has good urine output.  Creatinine is 1.1.  Will start diuresis with Lasix.  Endocrine:  Glucose is controlled with an insulin drip.  Will convert to sliding scale.  Id:  Patient had a T-max of 101.7°.  White count is 14.  Suspect due to stress of surgery.  Will continue to follow.    Heme:  Hematocrit is 26.9 and platelet count is 132.  Will start aspirin and Plavix.  Activities:  Patient's is on bedrest for 3 hours status post Impella removal.  Will advance activities as tolerated.  Line tubes and drains:  Patient has a right IJ [place] and Cordis, Varela catheter, chest tubes, saphenectomy site JOHN drain, A-line and pacer wires          Oscar LOUISA Cazares MD  Cardiothoracic Surgery  [facility] ([facility])

## 2023-11-05 NOTE — PROGRESS NOTES
[facility] - Intensive Care ([facility])  Cardiology  Progress Note    Patient Name: Carlin Isaac  MRN: [id number]  Admission Date: 10/28/2023  Hospital Length of Stay: 8 days  Code Status: Full Code   Attending Physician: Oscar Cazares MD   Primary Care Physician: Eliot Ambriz MD  Expected Discharge Date:   Principal Problem:Cardiac arrest    Subjective:     Hospital Course: 11/5/2023- 4 days post-op from 3 vesicle CABG. Monitor shows atrial pacing. Pt is sitting up in bed with no complications. Treating with aspirin, plavix, metoprolol, and amiodarone.       ROS  Objective:     Vital Signs (Most Recent):  Temp: 97.8 °F (36.6 °C) (11/05/23 0710)  Pulse: 89 (11/05/23 0710)  Resp: 18 (11/05/23 0710)  BP: 107/65 (11/05/23 0710)  SpO2: 99 % (11/05/23 0710) Vital Signs (24h Range):  Temp:  [97.8 °F (36.6 °C)-98.7 °F (37.1 °C)] 97.8 °F (36.6 °C)  Pulse:  [89-90] 89  Resp:  [16-32] 18  SpO2:  [94 %-100 %] 99 %  BP: (102-130)/(55-65) 107/65     Weight: 99.1 kg (218 lb 7.6 oz)  Body mass index is 27.31 kg/m².    SpO2: 99 %         Intake/Output Summary (Last 24 hours) at 11/5/2023 1104  Last data filed at 11/5/2023 0758  Gross per 24 hour   Intake 239.05 ml   Output 2795 ml   Net -2555.95 ml       Lines/Drains/Airways       Line  Duration                  Pacer Wires 11/02/23 1400 2 days              Peripheral Intravenous Line  Duration                  Peripheral IV - Single Lumen 11/04/23 1501 20 G Anterior;Left Upper Arm <1 day         Peripheral IV - Single Lumen 11/04/23 1501 20 G Left Antecubital <1 day                    Physical Exam    Significant Labs: All pertinent lab results from the last 24 hours have been reviewed.    Significant Imaging: None    Assessment and Plan:     Brief HPI:    Active Diagnoses:    Diagnosis Date Noted POA    PRINCIPAL PROBLEM:  Cardiac arrest [I46.9] 10/28/2023 Yes    S/P CABG x 3 [Z95.1] 11/04/2023 Not Applicable    Left main coronary artery disease [I25.10] 10/28/2023  Unknown    Primary hypertension [I10] 10/28/2023 Yes    Hyperlipidemia [E78.5] 10/28/2023 Yes    Hypogonadism in male [E29.1] 10/28/2023 Yes    Ulcerative proctitis [K51.20] 10/28/2023 Yes      Problems Resolved During this Admission:    Diagnosis Date Noted Date Resolved POA    Non-traumatic rhabdomyolysis [M62.82] 10/29/2023 11/01/2023 Yes    KAREN (acute kidney injury) [N17.9] 10/28/2023 11/02/2023 Yes    Acute hypercapnic respiratory failure [J96.02] 10/28/2023 11/01/2023 Yes       VTE Risk Mitigation (From admission, onward)           Ordered     IP VTE LOW RISK PATIENT  Once         11/01/23 1945     Place JUAN PABLO hose  Until discontinued         11/01/23 1945     heparin 25,000 units in dextrose 5% (100 units/ml) IV bolus from bag - ADDITIONAL PRN BOLUS - 30 units/kg (max bolus 4000 units)  As needed (PRN)        Question:  Heparin Infusion Adjustment (DO NOT MODIFY ANSWER)  Answer:  \\[email or web address]\[email or web address]\Images\Pharmacy\HeparinInfusions\heparin LOW INTENSITY nomogram for [facility] [id number].pdf    10/31/23 0001     heparin 25,000 units in dextrose 5% (100 units/ml) IV bolus from bag - ADDITIONAL PRN BOLUS - 60 units/kg (max bolus 4000 units)  As needed (PRN)        Question:  Heparin Infusion Adjustment (DO NOT MODIFY ANSWER)  Answer:  \"[email or web address]\epic\Images\Pharmacy\HeparinInfusions\heparin LOW INTENSITY nomogram for [facility] [id number].pdf    10/30/23 2212     heparin 25,000 units in dextrose 5% 250 mL (100 units/mL) infusion LOW INTENSITY nomogram - [facility]  Continuous        Question:  Begin at (units/kg/hr)  Answer:  12    10/30/23 2212     Place sequential compression device  Until discontinued         10/28/23 1229                  Dr. Nieto and Wallowa Chen  Cardiology  [facility] - Intensive Care ([facility])

## 2023-11-05 NOTE — PLAN OF CARE
Problem: Infection  Goal: Absence of Infection Signs and Symptoms  Outcome: Ongoing, Progressing     Problem: Adult Inpatient Plan of Care  Goal: Plan of Care Review  Outcome: Ongoing, Progressing  Goal: Patient-Specific Goal (Individualized)  Outcome: Ongoing, Progressing  Goal: Absence of Hospital-Acquired Illness or Injury  Outcome: Ongoing, Progressing  Goal: Optimal Comfort and Wellbeing  Outcome: Ongoing, Progressing  Goal: Readiness for Transition of Care  Outcome: Ongoing, Progressing     Problem: Fluid and Electrolyte Imbalance (Acute Kidney Injury/Impairment)  Goal: Fluid and Electrolyte Balance  Outcome: Ongoing, Progressing     Problem: Oral Intake Inadequate (Acute Kidney Injury/Impairment)  Goal: Optimal Nutrition Intake  Outcome: Ongoing, Progressing     Problem: Renal Function Impairment (Acute Kidney Injury/Impairment)  Goal: Effective Renal Function  Outcome: Ongoing, Progressing     Problem: Skin Injury Risk Increased  Goal: Skin Health and Integrity  Outcome: Ongoing, Progressing     Problem: Fall Injury Risk  Goal: Absence of Fall and Fall-Related Injury  Outcome: Ongoing, Progressing     Problem: Adjustment to Device (Cardiac Rhythm Management Device)  Goal: Optimal Adjustment to Device  Outcome: Ongoing, Progressing     Problem: Bleeding (Cardiac Rhythm Management Device)  Goal: Absence of Bleeding  Outcome: Ongoing, Progressing     Problem: Device-Related Complication Risk (Cardiac Rhythm Management Device)  Goal: Effective Device Function  Outcome: Ongoing, Progressing     Problem: Infection (Cardiac Rhythm Management Device)  Goal: Absence of Infection Signs and Symptoms  Outcome: Ongoing, Progressing     Problem: Pain (Cardiac Rhythm Management Device)  Goal: Acceptable Pain Level  Outcome: Ongoing, Progressing     Problem: Respiratory Compromise (Cardiac Rhythm Management Device)  Goal: Effective Oxygenation and Ventilation  Outcome: Ongoing, Progressing     ICU DAILY GOALS       A:  Awake    RASS: Goal - RASS Goal: 0-->alert and calm  Actual - RASS (Javier Agitation-Sedation Scale): alert and calm   Restraint necessity: Clinical Justification: Removing medical devices, Treatment Interference  B: Breath   SBT: Not intubated   C: Coordinate A & B, analgesics/sedatives   Pain: managed    SAT: Not intubated  D: Delirium   CAM-ICU: Overall CAM-ICU: Negative  E: Early(intubated/ Progressive (non-intubated) Mobility   MOVE Screen: Pass   Activity: Activity Management: Ambulated in room - L4  FAS: Feeding/Nutrition   Diet order: Diet/Nutrition Received: 2 gram sodium,   Fluid restriction:     Nutritional Supplement Intake: Quantity --, Type: Boost  T: Thrombus   DVT prophylaxis: VTE Required Core Measure: (SCDs) Sequential compression device initiated/maintained, Pharmacological prophylaxis initiated/maintained  H: HOB Elevation   Head of Bed (HOB) Positioning: HOB at 20-30 degrees  U: Ulcer Prophylaxis   GI: yes  G: Glucose control   managed Glycemic Management: blood glucose monitored  S: Skin   Bundle compliance: yes   Bathing/Skin Care: bath, complete Date: 11/5/23  B: Bowel Function   no issues LBM 11/3/23  I: Indwelling Catheters   Varela necessity: [REMOVED]      Urethral Catheter 10/28/23 1000 Latex 16 Fr.-Reason for Continuing Urinary Catheterization: Critically ill in ICU and requiring hourly monitoring of intake/output  [REMOVED]      Urethral Catheter 10/28/23 1318 Temperature probe-Reason for Continuing Urinary Catheterization: Critically ill in ICU and requiring hourly monitoring of intake/output   CVC necessity: No   IPAD offered: Not appropriate  D: De-escalation Antibx   Yes  Plan for the day   Continue present plan of care.   Family/Goals of care/Code Status   Code Status: Full Code     No acute events throughout day, VS and assessment per flow sheet, patient progressing towards goals as tolerated, plan of care reviewed with Carlin Isaac and family, all concerns addressed, will  continue to monitor.

## 2023-11-05 NOTE — ASSESSMENT & PLAN NOTE
11/3/2023  The patient is postop day 1 status post urgent coronary artery bypass grafting x3.  Overall, the patient is doing well.    Neuro:  Patient is awake alert and oriented x3.  Neuro exam is nonfocal.  Patient is requiring p.o. narcotic medication supplemented with p.r.n. IV for pain control.  Patient's source of pain is is chronic back pain.  Patient has minimal incisional pain complaints.    Cardiac:  Patient has been hemodynamically stable.  Cardiac output has been in the excess of 5.5 L per minute.  Epi has been weaned to off.  Impella left ventricular assist device has been removed.  Respiratory:  Patient was extubated yesterday.  Patient having good sats on nasal cannula.  Continue pulmonary toileting.  Continue incentive spirometer.  GI:  Will advance patient's as tolerated.  LFTs that were elevated preoperatively continue to trend downward.  Renal:  Patient has good urine output.  Creatinine is 1.1.  Will start diuresis with Lasix.  Endocrine:  Glucose is controlled with an insulin drip.  Will convert to sliding scale.  Id:  Patient had a T-max of 101.7°.  White count is 14.  Suspect due to stress of surgery.  Will continue to follow.    Heme:  Hematocrit is 26.9 and platelet count is 132.  Will start aspirin and Plavix.  Activities:  Patient's is on bedrest for 3 hours status post Impella removal.  Will advance activities as tolerated.  Line tubes and drains:  Patient has a right IJ [place] and Cordis, Varela catheter, chest tubes, saphenectomy site JOHN drain, A-line and pacer wires    11/04/2023   The patient is postop day 2 status post urgent coronary artery bypass grafting x3.  Overall the patient is doing well.    Neuro:  Patient is awake alert and oriented x3.  Neuro exam is nonfocal.  Patient's pain is better controlled.    Cardiac:  Patient is off all drips.  Blood pressure has been stable.  Continue metoprolol and amiodarone.    Respiratory:  Patient has good sats on nasal cannula.  Continue  pulmonary toileting.  Continue incentive spirometer.    GI:  Patient is tolerating a regular diet.  Advance as tolerated.  Renal:  Patient has good urine output.  Creatinine is stable.  Continue diuresis for now.    Endocrine: Glucose is controlled with a sliding scale.    Id:  Patient is afebrile and white count is 10.  Heme:  Hematocrit is 22 and platelet count is 95.  Patient is asymptomatic.  Continue aspirin and Plavix.    Activities:  Patient is out of bed and has ambulated.  Continue to advance as tolerated.  Line tubes and drains:  Patient has a right IJ [place] and Cordis which will be discontinued.  Varela catheter, A-line and pacer wire.    11/05/2023   The patient is postop day 3 status post urgent coronary artery bypass grafting x3.  Overall the patient is doing well.  Anticipate discharge home with home health in the next 48 hours.    Neuro:  Patient is awake alert and oriented x3.  Neuro exam is nonfocal.  Pain is much better controlled.  Will deescalate pain control to tramadol.    Cardiac:  Patient blood pressure has been stable continue metoprolol.  Respiratory:  Patient has good sats on room air.  Continue pulmonary toileting.  Continue incentive spirometer.    GI:  Patient is tolerating a regular diet.    Renal:  Patient has good urine output.  Creatinine is stable at 1.0.  Magnesium is 2.0 and being replaced replaced.  Patient appears euvolemic.  Will discontinue Lasix.  Endocrine:  Glucose is well controlled.    Id:  Patient is afebrile and white count is 11.    Heme:  Hematocrit is 23 and platelet count is 157.  Continue aspirin and Plavix.    Activities: Patient is out of bed and ambulating.  Continue to advance as tolerated.  Line tubes and drains patient has pacer wires.

## 2023-11-06 LAB
MAGNESIUM SERPL-MCNC: 2.2 MG/DL (ref 1.6–2.6)
MAGNESIUM SERPL-MCNC: 2.3 MG/DL (ref 1.6–2.6)
PHOSPHATE SERPL-MCNC: 2.8 MG/DL (ref 2.7–4.5)

## 2023-11-06 PROCEDURE — 99232 SBSQ HOSP IP/OBS MODERATE 35: CPT | Mod: ,,, | Performed by: PHYSICIAN ASSISTANT

## 2023-11-06 PROCEDURE — 99232 PR SUBSEQUENT HOSPITAL CARE,LEVL II: ICD-10-PCS | Mod: ,,, | Performed by: PHYSICIAN ASSISTANT

## 2023-11-06 PROCEDURE — 83735 ASSAY OF MAGNESIUM: CPT | Mod: 91 | Performed by: THORACIC SURGERY (CARDIOTHORACIC VASCULAR SURGERY)

## 2023-11-06 PROCEDURE — 97530 THERAPEUTIC ACTIVITIES: CPT

## 2023-11-06 PROCEDURE — 94799 UNLISTED PULMONARY SVC/PX: CPT | Mod: XB

## 2023-11-06 PROCEDURE — 25000003 PHARM REV CODE 250: Performed by: INTERNAL MEDICINE

## 2023-11-06 PROCEDURE — 27000646 HC AEROBIKA DEVICE

## 2023-11-06 PROCEDURE — C9113 INJ PANTOPRAZOLE SODIUM, VIA: HCPCS | Performed by: THORACIC SURGERY (CARDIOTHORACIC VASCULAR SURGERY)

## 2023-11-06 PROCEDURE — 99900035 HC TECH TIME PER 15 MIN (STAT)

## 2023-11-06 PROCEDURE — 36415 COLL VENOUS BLD VENIPUNCTURE: CPT | Performed by: THORACIC SURGERY (CARDIOTHORACIC VASCULAR SURGERY)

## 2023-11-06 PROCEDURE — 84100 ASSAY OF PHOSPHORUS: CPT | Performed by: INTERNAL MEDICINE

## 2023-11-06 PROCEDURE — 94761 N-INVAS EAR/PLS OXIMETRY MLT: CPT

## 2023-11-06 PROCEDURE — 21400001 HC TELEMETRY ROOM

## 2023-11-06 PROCEDURE — 94664 DEMO&/EVAL PT USE INHALER: CPT

## 2023-11-06 PROCEDURE — 63600175 PHARM REV CODE 636 W HCPCS: Performed by: THORACIC SURGERY (CARDIOTHORACIC VASCULAR SURGERY)

## 2023-11-06 PROCEDURE — 25000003 PHARM REV CODE 250: Performed by: THORACIC SURGERY (CARDIOTHORACIC VASCULAR SURGERY)

## 2023-11-06 PROCEDURE — 97116 GAIT TRAINING THERAPY: CPT

## 2023-11-06 RX ORDER — SODIUM CHLORIDE 9 MG/ML
INJECTION, SOLUTION INTRAVENOUS
Status: DISCONTINUED | OUTPATIENT
Start: 2023-11-06 | End: 2023-11-07 | Stop reason: HOSPADM

## 2023-11-06 RX ORDER — FUROSEMIDE 40 MG/1
40 TABLET ORAL 2 TIMES DAILY
Status: DISCONTINUED | OUTPATIENT
Start: 2023-11-06 | End: 2023-11-07 | Stop reason: HOSPADM

## 2023-11-06 RX ORDER — POTASSIUM CHLORIDE 750 MG/1
10 TABLET, EXTENDED RELEASE ORAL 2 TIMES DAILY
Status: DISCONTINUED | OUTPATIENT
Start: 2023-11-06 | End: 2023-11-07 | Stop reason: HOSPADM

## 2023-11-06 RX ORDER — LISINOPRIL 5 MG/1
5 TABLET ORAL DAILY
Status: DISCONTINUED | OUTPATIENT
Start: 2023-11-06 | End: 2023-11-07 | Stop reason: HOSPADM

## 2023-11-06 RX ADMIN — MAGNESIUM SULFATE HEPTAHYDRATE 2 G: 40 INJECTION, SOLUTION INTRAVENOUS at 08:11

## 2023-11-06 RX ADMIN — SODIUM CHLORIDE: 9 INJECTION, SOLUTION INTRAVENOUS at 08:11

## 2023-11-06 RX ADMIN — AMIODARONE HYDROCHLORIDE 200 MG: 200 TABLET ORAL at 08:11

## 2023-11-06 RX ADMIN — CLOPIDOGREL BISULFATE 75 MG: 75 TABLET ORAL at 08:11

## 2023-11-06 RX ADMIN — FOLIC ACID 1 MG: 1 TABLET ORAL at 08:11

## 2023-11-06 RX ADMIN — TRAMADOL HYDROCHLORIDE 50 MG: 50 TABLET, COATED ORAL at 08:11

## 2023-11-06 RX ADMIN — CYANOCOBALAMIN TAB 1000 MCG 1000 MCG: 1000 TAB at 08:11

## 2023-11-06 RX ADMIN — METOPROLOL TARTRATE 25 MG: 25 TABLET, FILM COATED ORAL at 08:11

## 2023-11-06 RX ADMIN — MAGNESIUM SULFATE HEPTAHYDRATE 2 G: 40 INJECTION, SOLUTION INTRAVENOUS at 09:11

## 2023-11-06 RX ADMIN — FUROSEMIDE 40 MG: 40 TABLET ORAL at 05:11

## 2023-11-06 RX ADMIN — FERROUS SULFATE TAB 325 MG (65 MG ELEMENTAL FE) 1 EACH: 325 (65 FE) TAB at 08:11

## 2023-11-06 RX ADMIN — CHLORHEXIDINE GLUCONATE 0.12% ORAL RINSE 10 ML: 1.2 LIQUID ORAL at 08:11

## 2023-11-06 RX ADMIN — PANTOPRAZOLE SODIUM 40 MG: 40 INJECTION, POWDER, FOR SOLUTION INTRAVENOUS at 08:11

## 2023-11-06 RX ADMIN — TRAMADOL HYDROCHLORIDE 50 MG: 50 TABLET, COATED ORAL at 05:11

## 2023-11-06 RX ADMIN — RIVAROXABAN 2.5 MG: 2.5 TABLET, FILM COATED ORAL at 10:11

## 2023-11-06 RX ADMIN — ATORVASTATIN CALCIUM 80 MG: 40 TABLET, FILM COATED ORAL at 08:11

## 2023-11-06 RX ADMIN — FUROSEMIDE 40 MG: 40 TABLET ORAL at 09:11

## 2023-11-06 RX ADMIN — LISINOPRIL 5 MG: 5 TABLET ORAL at 09:11

## 2023-11-06 RX ADMIN — MAGNESIUM HYDROXIDE 400 MG: 400 SUSPENSION ORAL at 08:11

## 2023-11-06 RX ADMIN — POTASSIUM CHLORIDE 10 MEQ: 750 TABLET, EXTENDED RELEASE ORAL at 10:11

## 2023-11-06 RX ADMIN — ACETAMINOPHEN 650 MG: 325 TABLET ORAL at 12:11

## 2023-11-06 RX ADMIN — ASPIRIN 81 MG: 81 TABLET, COATED ORAL at 08:11

## 2023-11-06 RX ADMIN — OXYCODONE HYDROCHLORIDE AND ACETAMINOPHEN 500 MG: 500 TABLET ORAL at 08:11

## 2023-11-06 RX ADMIN — POTASSIUM CHLORIDE 10 MEQ: 750 TABLET, EXTENDED RELEASE ORAL at 09:11

## 2023-11-06 NOTE — ASSESSMENT & PLAN NOTE
11/3/2023  The patient is postop day 1 status post urgent coronary artery bypass grafting x3.  Overall, the patient is doing well.    Neuro:  Patient is awake alert and oriented x3.  Neuro exam is nonfocal.  Patient is requiring p.o. narcotic medication supplemented with p.r.n. IV for pain control.  Patient's source of pain is is chronic back pain.  Patient has minimal incisional pain complaints.    Cardiac:  Patient has been hemodynamically stable.  Cardiac output has been in the excess of 5.5 L per minute.  Epi has been weaned to off.  Impella left ventricular assist device has been removed.  Respiratory:  Patient was extubated yesterday.  Patient having good sats on nasal cannula.  Continue pulmonary toileting.  Continue incentive spirometer.  GI:  Will advance patient's as tolerated.  LFTs that were elevated preoperatively continue to trend downward.  Renal:  Patient has good urine output.  Creatinine is 1.1.  Will start diuresis with Lasix.  Endocrine:  Glucose is controlled with an insulin drip.  Will convert to sliding scale.  Id:  Patient had a T-max of 101.7°.  White count is 14.  Suspect due to stress of surgery.  Will continue to follow.    Heme:  Hematocrit is 26.9 and platelet count is 132.  Will start aspirin and Plavix.  Activities:  Patient's is on bedrest for 3 hours status post Impella removal.  Will advance activities as tolerated.  Line tubes and drains:  Patient has a right IJ [place] and Cordis, Varela catheter, chest tubes, saphenectomy site JOHN drain, A-line and pacer wires    11/04/2023   The patient is postop day 2 status post urgent coronary artery bypass grafting x3.  Overall the patient is doing well.    Neuro:  Patient is awake alert and oriented x3.  Neuro exam is nonfocal.  Patient's pain is better controlled.    Cardiac:  Patient is off all drips.  Blood pressure has been stable.  Continue metoprolol and amiodarone.    Respiratory:  Patient has good sats on nasal cannula.  Continue  pulmonary toileting.  Continue incentive spirometer.    GI:  Patient is tolerating a regular diet.  Advance as tolerated.  Renal:  Patient has good urine output.  Creatinine is stable.  Continue diuresis for now.    Endocrine: Glucose is controlled with a sliding scale.    Id:  Patient is afebrile and white count is 10.  Heme:  Hematocrit is 22 and platelet count is 95.  Patient is asymptomatic.  Continue aspirin and Plavix.    Activities:  Patient is out of bed and has ambulated.  Continue to advance as tolerated.  Line tubes and drains:  Patient has a right IJ [place] and Cordis which will be discontinued.  Varela catheter, A-line and pacer wire.    11/05/2023   The patient is postop day 3 status post urgent coronary artery bypass grafting x3.  Overall the patient is doing well.  Anticipate discharge home with home health in the next 48 hours.    Neuro:  Patient is awake alert and oriented x3.  Neuro exam is nonfocal.  Pain is much better controlled.  Will deescalate pain control to tramadol.    Cardiac:  Patient blood pressure has been stable continue metoprolol.  Respiratory:  Patient has good sats on room air.  Continue pulmonary toileting.  Continue incentive spirometer.    GI:  Patient is tolerating a regular diet.    Renal:  Patient has good urine output.  Creatinine is stable at 1.0.  Magnesium is 2.0 and being replaced replaced.  Patient appears euvolemic.  Will discontinue Lasix.  Endocrine:  Glucose is well controlled.    Id:  Patient is afebrile and white count is 11.    Heme:  Hematocrit is 23 and platelet count is 157.  Continue aspirin and Plavix.    Activities: Patient is out of bed and ambulating.  Continue to advance as tolerated.  Line tubes and drains patient has pacer wires.    11/06/2023   The patient is postop day 4 status post coronary artery bypass grafting x3.  Overall the patient is doing well.  Anticipate discharge to home with home health in the next 24 hours.    Neuro:  Patient is awake  alert and oriented x3.  Neuro exam is nonfocal.  Pain is well controlled.    Cardiac:  Patient is has a systolic blood pressure of 150.  Will add lisinopril.  Respiratory:  Patient has good sats on room air.  Continue pulmonary toileting.    GI:  Patient is tolerating a regular diet and has had bowel movements.  Renal:  Patient has good urine output.  Patient has bilateral pedal edema.  Will start p.o. Lasix.    Endocrine: Glucose is well controlled.  Id:  Patient is afebrile.    Heme:  Patient is on aspirin and Plavix.  Will add low-dose 2.5 mg b.i.d. of rivaroxaban for anti thrombin effect since he is status post an acute coronary syndrome.  Activities:  Patient is out of bed and ambulating.  Continue to advance as tolerated.  Line tubes and drains:  Patient has peripheral lines.

## 2023-11-06 NOTE — SUBJECTIVE & OBJECTIVE
Review of Systems   Constitutional: Positive for malaise/fatigue.   HENT: Negative.     Eyes: Negative.    Cardiovascular:  Positive for chest pain (incisional).   Respiratory: Negative.     Endocrine: Negative.    Hematologic/Lymphatic: Negative.    Skin: Negative.    Musculoskeletal: Negative.    Gastrointestinal: Negative.    Genitourinary: Negative.    Neurological: Negative.    Psychiatric/Behavioral: Negative.     Allergic/Immunologic: Negative.      Objective:     Vital Signs (Most Recent):  Temp: 97.9 °F (36.6 °C) (11/06/23 0702)  Pulse: 69 (11/06/23 1101)  Resp: 18 (11/06/23 0813)  BP: (!) 150/62 (11/06/23 0928)  SpO2: 98 % (11/06/23 0813) Vital Signs (24h Range):  Temp:  [97.8 °F (36.6 °C)-98.6 °F (37 °C)] 97.9 °F (36.6 °C)  Pulse:  [69-92] 69  Resp:  [14-39] 18  SpO2:  [95 %-98 %] 98 %  BP: (105-150)/(54-67) 150/62     Weight: 99.1 kg (218 lb 7.6 oz)  Body mass index is 27.31 kg/m².     SpO2: 98 %         Intake/Output Summary (Last 24 hours) at 11/6/2023 1143  Last data filed at 11/6/2023 1042  Gross per 24 hour   Intake 257.77 ml   Output --   Net 257.77 ml       Lines/Drains/Airways       Peripheral Intravenous Line  Duration                  Peripheral IV - Single Lumen 11/04/23 1501 20 G Anterior;Left Upper Arm 1 day         Peripheral IV - Single Lumen 11/04/23 1501 20 G Left Antecubital 1 day                       Physical Exam  Vitals and nursing note reviewed.   Constitutional:       General: He is not in acute distress.     Appearance: Normal appearance. He is well-developed. He is not diaphoretic.   HENT:      Head: Normocephalic and atraumatic.   Eyes:      General:         Right eye: No discharge.         Left eye: No discharge.      Pupils: Pupils are equal, round, and reactive to light.   Neck:      Thyroid: No thyromegaly.      Vascular: No JVD.      Trachea: No tracheal deviation.   Cardiovascular:      Rate and Rhythm: Normal rate and regular rhythm.      Heart sounds: Normal heart  "sounds, S1 normal and S2 normal. No murmur heard.     Comments: Sternotomy site healing well; C/D/I; no bleeding erythema or drainage  Pulmonary:      Effort: Pulmonary effort is normal. No respiratory distress.      Breath sounds: No wheezing.      Comments: Diminished BS at bases  Abdominal:      General: There is no distension.      Tenderness: There is no rebound.   Musculoskeletal:      Cervical back: Neck supple.      Right lower leg: Edema present.      Left lower leg: Edema present.   Skin:     General: Skin is warm and dry.      Findings: No erythema.   Neurological:      General: No focal deficit present.      Mental Status: He is alert and oriented to person, place, and time.   Psychiatric:         Mood and Affect: Mood normal.         Behavior: Behavior normal.         Thought Content: Thought content normal.            Significant Labs: CMP   Recent Labs   Lab 11/05/23  0517      K 4.1   CL 98   CO2 31*   *   BUN 30*   CREATININE 1.0   CALCIUM 8.1*   PROT 5.1*   ALBUMIN 2.8*   BILITOT 0.4   ALKPHOS 46*   AST 47*   ALT 55*   ANIONGAP 8   , CBC   Recent Labs   Lab 11/05/23  0517   WBC 11.17   HGB 7.7*   HCT 23.3*      , Troponin No results for input(s): "TROPONINI" in the last 48 hours., and All pertinent lab results from the last 24 hours have been reviewed.    Significant Imaging: Echocardiogram: Transthoracic echo (TTE) complete (Cupid Only):   Results for orders placed or performed during the hospital encounter of 10/28/23   Echo Saline Bubble? No   Result Value Ref Range    BSA 2.3 m2    RV mid diameter 3.43 cm    Est. RA pres 3 mmHg    Narrative      Left Ventricle: The left ventricle is normal in size. Normal wall   thickness. Normal wall motion. There is normal systolic function with a   visually estimated ejection fraction of 60 - 65%. There is normal   diastolic function.    Right Ventricle: Normal right ventricular cavity size. Wall thickness   is normal. Right ventricle " wall motion  is normal. Systolic function is   normal.    IVC/SVC: Normal venous pressure at 3 mmHg.     , EKG: Reviewed, and X-Ray: CXR: X-Ray Chest 1 View (CXR): No results found for this visit on 10/28/23. and X-Ray Chest PA and Lateral (CXR): No results found for this visit on 10/28/23.

## 2023-11-06 NOTE — ASSESSMENT & PLAN NOTE
-Continue current post-op care and mgmt as per CTS  -Continue ASA, Plavix, BB, statin, ACEi  -PT/OT

## 2023-11-06 NOTE — PT/OT/SLP PROGRESS
Physical Therapy Treatment    Patient Name:  Carlin Isaac   MRN:  [id number]    Recommendations:     Discharge Recommendations: Low Intensity Therapy  Discharge Equipment Recommendations: shower chair  Barriers to discharge: None    Assessment:     Carlin Isaac is a 68 y.o. male admitted with a medical diagnosis of Cardiac arrest.  He presents with the following impairments/functional limitations: weakness, impaired endurance, impaired functional mobility, gait instability, impaired balance, decreased safety awareness, decreased lower extremity function, decreased coordination, decreased upper extremity function.    Rehab Prognosis: Good; patient would benefit from acute skilled PT services to address these deficits and reach maximum level of function.    Recent Surgery: Procedure(s) (LRB):  CORONARY ARTERY BYPASS GRAFT (CABG) (N/A)  ECHOCARDIOGRAM,TRANSESOPHAGEAL (N/A)  SURGICAL PROCUREMENT, VEIN, ENDOSCOPIC (Left)  BLOCK, NERVE, INTERCOSTAL, 2 OR MORE (N/A) 4 Days Post-Op    Plan:     During this hospitalization, patient to be seen 3 x/week to address the identified rehab impairments via gait training, therapeutic activities, therapeutic exercises and progress toward the following goals:    Plan of Care Expires:  11/20/23    Subjective     Chief Complaint: NONE, AGREEABLE TO TX.  Patient/Family Comments/goals:   Pain/Comfort:  Pain Rating 1: 0/10      Objective:     Communicated with NURSE DHILLON prior to session.  Patient found supine with telemetry, peripheral IV upon PT entry to room.     General Precautions: Standard, fall, sternal  Orthopedic Precautions: N/A  Braces: N/A  Respiratory Status: Room air     Functional Mobility:  Bed Mobility:     Rolling Left:  modified independence  Scooting: modified independence  Supine to Sit: modified independence  Transfers:     Sit to Stand:  modified independence with no AD  Bed to Chair: modified independence with  no AD  using  Step Transfer  Gait: PT ' NO  "AD WITH SPV INITIALLY, REQUIRED CLOSE SBA OVER TIME WITH FATIGUE, MILDLY UNSTEADY BUT NO GROSS LOB, CUES FOR UPRIGHT POSTURE  Balance: GOOD SITTING BALANCE, FAIR+ DYNAMIC BALANCE DURING GAIT    AM-PAC 6 CLICK MOBILITY  Turning over in bed (including adjusting bedclothes, sheets and blankets)?: 4  Sitting down on and standing up from a chair with arms (e.g., wheelchair, bedside commode, etc.): 4  Moving from lying on back to sitting on the side of the bed?: 4  Moving to and from a bed to a chair (including a wheelchair)?: 4  Need to walk in hospital room?: 4  Climbing 3-5 steps with a railing?: 1 (NT)  Basic Mobility Total Score: 21     Treatment & Education:  PT EDUCATION:  - ROLE OF P.T. AND POC IN ACUTE CARE HOSPITAL SETTING  - REVIEW STERNAL PRECAUTIONS  - IMPORTANCE OF ACTIVITY PACING FOR ENERGY CONSERVATION  - ENCOURAGED TO INCREASE TIME OOB IN CHAIR TO TOLERANCE   - TO CONTINUE THERAPUETIC EXERCISES THROUGHOUT THE DAY TO INCREASE ACTIVITY TOLERANCE AND DECREASE RISK FOR PNEUMONIA AND BLOOD CLOTS: HIP FLEX/EXT, HIP ABD/ADD, QUAD SET, HEEL SLIDE, AP  - RISK FOR FALLS DUE TO GENERALIZED WEAKNESS, EDUCATED ON "CALL DON'T FALL", ENCOURAGED TO CALL FOR ASSISTANCE WITH ALL NEEDS SUCH AS BED<>CHAIR TRANSFERS OR TRIPS TO BATHROOM, PT AGREEABLE TO SAFETY PRECAUTIONS    Patient left up in chair with all lines intact, call button in reach, chair alarm on, and NURSE notified..    GOALS:   Multidisciplinary Problems       Physical Therapy Goals          Problem: Physical Therapy    Goal Priority Disciplines Outcome Goal Variances Interventions   Physical Therapy Goal     PT, PT/OT Ongoing, Progressing     Description: LTG'S TO BE MET IN 14 DAYS (11-20-23)  PT WILL BE INDEP FOR BED MOBILITY  PT WILL BE INDEP FOR BED<>CHAIR TF'S  PT WILL  FEET NO AD GREG  PT WILL INC AMPAC SCORE BY 2 POINTS TO PROGRESS GROSS FUNC MOBILITY                         Time Tracking:     PT Received On: 11/06/23  PT Start Time: 0910     PT " Stop Time: 0935  PT Total Time (min): 25 min     Billable Minutes: Gait Training 10 and Therapeutic Activity 15    Treatment Type: Treatment  PT/PTA: PT     Number of PTA visits since last PT visit: 0     11/06/2023

## 2023-11-06 NOTE — PLAN OF CARE
POC reviewed with pt. Pt verbalizes understanding of POC. No questions at this time.  AAOx4. NADN.  NSR on cardiac monitor. Pacer wires removed today.   Provena wound vac removed today.   Pt remains free of falls.   No complaints at this time.  Safety measures in place. Will continue to monitor.  Informed pt to call for assistance before getting up. Pt verbalizes understanding.   Hourly rounding and chart check complete.

## 2023-11-06 NOTE — PT/OT/SLP PROGRESS
"Occupational Therapy   Treatment    Name: Carlin Isaac  MRN: [id number]  Admitting Diagnosis:  Cardiac arrest  4 Days Post-Op    Recommendations:     Discharge Recommendations: Low Intensity Therapy  Discharge Equipment Recommendations:  shower chair  Barriers to discharge:  None    Assessment:     Carlin Isaac is a 68 y.o. male with a medical diagnosis of Cardiac arrest.  He presents with the following performance deficits affecting function are weakness, impaired endurance, impaired self care skills, impaired functional mobility, impaired balance, impaired cardiopulmonary response to activity (sternal precautions).     Rehab Prognosis:  Good; patient would benefit from acute skilled OT services to address these deficits and reach maximum level of function.       Plan:     Patient to be seen 2 x/week to address the above listed problems via self-care/home management, therapeutic activities, therapeutic exercises  Plan of Care Expires: 11/18/23  Plan of Care Reviewed with: patient    Subjective     Chief Complaint: Reported "He said I can go home tomorrow."  Patient/Family Comments/goals: none reported  Pain/Comfort:  Pain Rating 1: 0/10    Objective:     Communicated with: NurseChristal, prior to session.  Patient found supine with peripheral IV, telemetry upon OT entry to room.    General Precautions: Standard, fall, sternal    Orthopedic Precautions:N/A  Braces: N/A  Respiratory Status: Room air     Occupational Performance:     Bed Mobility:    Patient completed Supine to Sit with modified independence with excellent sternal precaution compliance and no v/c for technique    Functional Mobility/Transfers:  Patient completed Sit <> Stand Transfer with supervision  with  no assistive device   Patient completed Bed <> Chair Transfer using Step Transfer technique with supervision with no assistive device  Functional Mobility: Patient completed x200ft functional mobility without AD to increase dynamic standing " balance and activity tolerance needed for ADL completion.  Initial x160ft patient completed with SPV  With fatigue increased to SBA for final x40ft    Activities of Daily Living:  Upper Body Dressing: setup A donned gown backwards as robe prior to ambulating in hallways    [facility] 6 Click ADL: 20    Treatment & Education:  Patient tolerated intervention well. Excellent sternal precaution compliance. Provided with education re: importance of Q hour ambulation upon d/c home. Educated on recommendation for seated showers and additional energy conservation techniques to use with ADLs. Patient educated on benefits of OOB activity, importance of x3 additional walking trials this date with staff, and need to call for A to transfer in room to minimize fall risk. Reinforced education re: B UE AROM HEP, within precautions, to promote increase in activity tolerance and functional strength. Patient stated understanding and in agreement with POC.    Patient left up in chair with all lines intact, call button in reach, and nurse notified    GOALS:   Multidisciplinary Problems       Occupational Therapy Goals          Problem: Occupational Therapy    Goal Priority Disciplines Outcome Interventions   Occupational Therapy Goal     OT, PT/OT Ongoing, Progressing    Description: Goals to be met by: 11/18/23     Patient will increase functional independence with ADLs by performing:    Toileting from toilet with Supervision for hygiene and clothing management.   Toilet transfer to toilet with Supervision.  Demonstrates 100% functional compliance with sternal precautions.                         Time Tracking:     OT Date of Treatment: 11/06/23  OT Start Time: 0920  OT Stop Time: 0945  OT Total Time (min): 25 min    Billable Minutes:Therapeutic Activity 25    Jenna Greenfield OT  11/6/2023

## 2023-11-06 NOTE — ASSESSMENT & PLAN NOTE
Will continue supportive care  Continue amiodarone  Continue enoxaparin  Add BB if needed    11/6/23  -Recovered, now s/p CABG  -Continue amio, BB

## 2023-11-06 NOTE — PROGRESS NOTES
[facility] - Telemetry ([facility])  Cardiology  Progress Note    Patient Name: Carlin Isaac  MRN: [id number]  Admission Date: 10/28/2023  Hospital Length of Stay: 9 days  Code Status: Full Code   Attending Physician: Oscar Cazares MD   Primary Care Physician: Eliot Ambriz MD  Expected Discharge Date:   Principal Problem:Cardiac arrest    Subjective:   HPI:  History of Present Illness: Carlin Isaac is a 68 y.o. male patient who presents to the Emergency Department for evaluation of cardiac arrest onset PTA. Pt was working out at a [employer] when he suddenly collapsed. EMS administered 3 shocks, 3 epis, and amio while on route. Symptoms are constant and moderate in severity. No mitigating or exacerbating factors reported. Associated sxs not reported. Other prior Tx not reported. No further complaints or concerns at this time.      Per significant other, Coronary artery disease involving native coronary artery of native heart without angina pectoris  -s/p remote PCI/stent of the proximal/mid LAD 2007  -[facility] 09/2022 with distal left main, ostial LAD, and proximal LCx disease w/left dominate circulation, has preserved LVEF-CT surgery consulted and recommended elective CABG. Medical therapy optimized, and anginal symptoms did not reoccur therefore patient deferred CABG-will obtain if has further issues with angina  -will continue on current GDMT with aspirin, statin, BB, and long-acting nitrate      H/O bilateral carotid disease of 30% and HTN.       Hospital Course:   10/29/2023: The patient is supported intubated stable.  Cardiac echo shows normal LV function troponin levels are elevated EKG shows ST segments have reverted back to normal sinus rhythm with normal intervals and no evidence of further ST changes.  Putting the wife the patient has significant coronary disease and had refused bypass surgery last year.  Will plan another heart catheterization after he is wakeful and extubated.  Clinically  stable this time with supportive care.    10/30/23   Intubated, NSR, some nonsustained tachyrhythmia overnight.Pt on Levo, Amio,.     10/31/23 CP last night requiring tridil, troponin > 50, no CP on exam. Discussed cath with pt and agrees. Will schedule with Dr. Mathew    11/1/23   No CP, CABG tomorrow, impella in place, no CP    11/2/23  s/p 3V CABG, on post op vasopressors, tele NSR    11/3/23   POD #2, extubated, impella removed, tele monitor A paicng, epi weaned off    11/6/23-Patient seen and examined today, sitting up in bedside chair. Recovering well post-CABG. Ambulating with PT/OT. Pain controlled.           Review of Systems   Constitutional: Positive for malaise/fatigue.   HENT: Negative.     Eyes: Negative.    Cardiovascular:  Positive for chest pain (incisional).   Respiratory: Negative.     Endocrine: Negative.    Hematologic/Lymphatic: Negative.    Skin: Negative.    Musculoskeletal: Negative.    Gastrointestinal: Negative.    Genitourinary: Negative.    Neurological: Negative.    Psychiatric/Behavioral: Negative.     Allergic/Immunologic: Negative.      Objective:     Vital Signs (Most Recent):  Temp: 97.9 °F (36.6 °C) (11/06/23 0702)  Pulse: 69 (11/06/23 1101)  Resp: 18 (11/06/23 0813)  BP: (!) 150/62 (11/06/23 0928)  SpO2: 98 % (11/06/23 0813) Vital Signs (24h Range):  Temp:  [97.8 °F (36.6 °C)-98.6 °F (37 °C)] 97.9 °F (36.6 °C)  Pulse:  [69-92] 69  Resp:  [14-39] 18  SpO2:  [95 %-98 %] 98 %  BP: (105-150)/(54-67) 150/62     Weight: 99.1 kg (218 lb 7.6 oz)  Body mass index is 27.31 kg/m².     SpO2: 98 %         Intake/Output Summary (Last 24 hours) at 11/6/2023 1143  Last data filed at 11/6/2023 1042  Gross per 24 hour   Intake 257.77 ml   Output --   Net 257.77 ml       Lines/Drains/Airways       Peripheral Intravenous Line  Duration                  Peripheral IV - Single Lumen 11/04/23 1501 20 G Anterior;Left Upper Arm 1 day         Peripheral IV - Single Lumen 11/04/23 1501 20 G Left Antecubital  "1 day                       Physical Exam  Vitals and nursing note reviewed.   Constitutional:       General: He is not in acute distress.     Appearance: Normal appearance. He is well-developed. He is not diaphoretic.   HENT:      Head: Normocephalic and atraumatic.   Eyes:      General:         Right eye: No discharge.         Left eye: No discharge.      Pupils: Pupils are equal, round, and reactive to light.   Neck:      Thyroid: No thyromegaly.      Vascular: No JVD.      Trachea: No tracheal deviation.   Cardiovascular:      Rate and Rhythm: Normal rate and regular rhythm.      Heart sounds: Normal heart sounds, S1 normal and S2 normal. No murmur heard.     Comments: Sternotomy site healing well; C/D/I; no bleeding erythema or drainage  Pulmonary:      Effort: Pulmonary effort is normal. No respiratory distress.      Breath sounds: No wheezing.      Comments: Diminished BS at bases  Abdominal:      General: There is no distension.      Tenderness: There is no rebound.   Musculoskeletal:      Cervical back: Neck supple.      Right lower leg: Edema present.      Left lower leg: Edema present.   Skin:     General: Skin is warm and dry.      Findings: No erythema.   Neurological:      General: No focal deficit present.      Mental Status: He is alert and oriented to person, place, and time.   Psychiatric:         Mood and Affect: Mood normal.         Behavior: Behavior normal.         Thought Content: Thought content normal.            Significant Labs: CMP   Recent Labs   Lab 11/05/23  0517      K 4.1   CL 98   CO2 31*   *   BUN 30*   CREATININE 1.0   CALCIUM 8.1*   PROT 5.1*   ALBUMIN 2.8*   BILITOT 0.4   ALKPHOS 46*   AST 47*   ALT 55*   ANIONGAP 8   , CBC   Recent Labs   Lab 11/05/23  0517   WBC 11.17   HGB 7.7*   HCT 23.3*      , Troponin No results for input(s): "TROPONINI" in the last 48 hours., and All pertinent lab results from the last 24 hours have been reviewed.    Significant " Imaging: Echocardiogram: Transthoracic echo (TTE) complete (Cupid Only):   Results for orders placed or performed during the hospital encounter of 10/28/23   Echo Saline Bubble? No   Result Value Ref Range    BSA 2.3 m2    RV mid diameter 3.43 cm    Est. RA pres 3 mmHg    Narrative      Left Ventricle: The left ventricle is normal in size. Normal wall   thickness. Normal wall motion. There is normal systolic function with a   visually estimated ejection fraction of 60 - 65%. There is normal   diastolic function.    Right Ventricle: Normal right ventricular cavity size. Wall thickness   is normal. Right ventricle wall motion  is normal. Systolic function is   normal.    IVC/SVC: Normal venous pressure at 3 mmHg.     , EKG: Reviewed, and X-Ray: CXR: X-Ray Chest 1 View (CXR): No results found for this visit on 10/28/23. and X-Ray Chest PA and Lateral (CXR): No results found for this visit on 10/28/23.    Assessment and Plan:   Patient who presents s/p cardiac arrest. Recovering well s/p CABG. Continue OMT as per CTS.    * Cardiac arrest  Will continue supportive care  Continue amiodarone  Continue enoxaparin  Add BB if needed    11/6/23  -Recovered, now s/p CABG  -Continue amio, BB    S/P CABG x 3  -Continue current post-op care and mgmt as per CTS  -Continue ASA, Plavix, BB, statin, ACEi  -PT/OT    Left main coronary artery disease  Significant CAD, EKG shows anterior and lateral ischemia  Plan nitrates if possible  1 st troponin negative and will trend  Likely cardiac event was VT  Cardiac echo shows  Preserved LV function    11/6/23  -Recovering well s/p CABG        VTE Risk Mitigation (From admission, onward)         Ordered     IP VTE LOW RISK PATIENT  Once         11/01/23 1945     Place JUAN PABLO hose  Until discontinued         11/01/23 1945     heparin 25,000 units in dextrose 5% (100 units/ml) IV bolus from bag - ADDITIONAL PRN BOLUS - 30 units/kg (max bolus 4000 units)  As needed (PRN)        Question:  Heparin  Infusion Adjustment (DO NOT MODIFY ANSWER)  Answer:  [email or web address]\epic\Images\Pharmacy\HeparinInfusions\heparin LOW INTENSITY nomogram for [facility] [id number]    10/31/23 0001     heparin 25,000 units in dextrose 5% (100 units/ml) IV bolus from bag - ADDITIONAL PRN BOLUS - 60 units/kg (max bolus 4000 units)  As needed (PRN)        Question:  Heparin Infusion Adjustment (DO NOT MODIFY ANSWER)  Answer:  [email or web address]\epic\Images\Pharmacy\HeparinInfusions\heparin LOW INTENSITY nomogram for [facility] [id number]    10/30/23 2212     heparin 25,000 units in dextrose 5% 250 mL (100 units/mL) infusion LOW INTENSITY nomogram - [facility]  Continuous        Question:  Begin at (units/kg/hr)  Answer:  12    10/30/23 2212     Place sequential compression device  Until discontinued         10/28/23 2533                Kym Noel PA-C  Cardiology  [facility] - Telemetry ([facility])

## 2023-11-06 NOTE — CONSULTS
SW met with patient and family at bedside regarding consult. SW advised anticipated discharge for tomorrow pending medical clearance. SW discussed recommendations for home health. Pt agreeable with family's support. SW [facility]. [facility] needed.  SW to f/u tmrrw.

## 2023-11-06 NOTE — PROGRESS NOTES
[facility])  Cardiothoracic Surgery  Progress Note    Patient Name: Carlin Isaac  MRN: [id number]  Admission Date: 10/28/2023  Hospital Length of Stay: 9 days  Code Status: Full Code   Attending Physician: Oscar Cazares MD   Referring Provider: Self, Aaareferral  Principal Problem:Cardiac arrest            Subjective:     Post-Op Info:  Procedure(s) (LRB):  CORONARY ARTERY BYPASS GRAFT (CABG) (N/A)  ECHOCARDIOGRAM,TRANSESOPHAGEAL (N/A)  SURGICAL PROCUREMENT, VEIN, ENDOSCOPIC (Left)  BLOCK, NERVE, INTERCOSTAL, 2 OR MORE (N/A)   4 Days Post-Op     Interval History:  The patient is postop day 4 status post urgent coronary artery bypass grafting x3.    ROS  Medications:  Continuous Infusions:  Scheduled Meds:   acetaminophen  650 mg Oral Q6H    amiodarone  200 mg Oral BID    ascorbic acid (vitamin C)  500 mg Oral BID    aspirin  81 mg Oral Daily    atorvastatin  80 mg Oral Daily    chlorhexidine  10 mL Mouth/Throat BID    clopidogreL  75 mg Oral Daily    cyanocobalamin  1,000 mcg Oral Daily    docusate sodium  100 mg Oral BID    ferrous sulfate  1 tablet Oral Daily    folic acid  1 mg Oral Daily    furosemide  40 mg Oral BID    lisinopriL  5 mg Oral Daily    magnesium hydroxide 400 mg/5 ml  5 mL Oral BID    metoprolol tartrate  25 mg Oral BID    pantoprazole  40 mg Intravenous Daily    polyethylene glycol  17 g Oral Daily    potassium chloride  10 mEq Oral BID    rivaroxaban  2.5 mg Oral BID WM     PRN Meds:sodium chloride 0.9%, acetaminophen, albumin human 5%, calcium gluconate IVPB, calcium gluconate IVPB, calcium gluconate IVPB, dextrose 10%, dextrose 10%, glucagon (human recombinant), glucose, glucose, hydrALAZINE, influenza 65up-adj, insulin aspart U-100, lactated ringers, magnesium sulfate IVPB, magnesium sulfate IVPB, magnesium sulfate IVPB, metoclopramide HCl, ondansetron, potassium chloride in water, potassium chloride in water, potassium chloride in water, sodium  chloride 0.9%, sodium chloride 0.9%, sodium phosphate 15 mmol in dextrose 5 % (D5W) 250 mL IVPB, sodium phosphate 20.01 mmol in dextrose 5 % (D5W) 250 mL IVPB, sodium phosphate 30 mmol in dextrose 5 % (D5W) 250 mL IVPB, traMADoL     Objective:     Vital Signs (Most Recent):  Temp: 97.9 °F (36.6 °C) (11/06/23 0702)  Pulse: 72 (11/06/23 0909)  Resp: 18 (11/06/23 0813)  BP: (!) 150/62 (11/06/23 0928)  SpO2: 98 % (11/06/23 0813) Vital Signs (24h Range):  Temp:  [97.8 °F (36.6 °C)-98.6 °F (37 °C)] 97.9 °F (36.6 °C)  Pulse:  [72-92] 72  Resp:  [14-39] 18  SpO2:  [95 %-98 %] 98 %  BP: (105-150)/(54-67) 150/62     Weight: 99.1 kg (218 lb 7.6 oz)  Body mass index is 27.31 kg/m².    SpO2: 98 %       Intake/Output - Last 3 Shifts         11/04 0700 11/05 0659 11/05 0700 11/06 0659 11/06 0700 11/07 0659    P.O.  320     I.V. (mL/kg) 39.1 (0.4) 35.5 (0.4)     IV Piggyback       Total Intake(mL/kg) 39.1 (0.4) 355.5 (3.6)     Urine (mL/kg/hr) 2995 (1.3) 300 (0.1)     Drains       Stool  0     Chest Tube       Total Output 2995 300     Net -2956 +55.5            Stool Occurrence  2 x             Lines/Drains/Airways       Peripheral Intravenous Line  Duration                  Peripheral IV - Single Lumen 11/04/23 1501 20 G Anterior;Left Upper Arm 1 day         Peripheral IV - Single Lumen 11/04/23 1501 20 G Left Antecubital 1 day                     Physical Exam  Constitutional:       Appearance: Normal appearance.   HENT:      Head: Normocephalic and atraumatic.   Cardiovascular:      Rate and Rhythm: Normal rate and regular rhythm.      Heart sounds: Normal heart sounds.   Pulmonary:      Effort: Pulmonary effort is normal.      Breath sounds: Normal breath sounds.   Abdominal:      General: Abdomen is flat. Bowel sounds are normal.      Palpations: Abdomen is soft.   Musculoskeletal:      Right lower leg: Edema present.      Left lower leg: Edema present.   Skin:     General: Skin is warm and dry.   Neurological:      Mental  Status: He is alert and oriented to person, place, and time.   Psychiatric:         Behavior: Behavior normal.            Significant Labs:  All pertinent labs from the last 24 hours have been reviewed.    Significant Diagnostics:  I have reviewed all pertinent imaging results/findings within the past 24 hours.    Assessment/Plan:     * Cardiac arrest  The patient is a 68-year-old male with coronary artery disease who is status post cardiac arrest.  Cardiac catheterization shows severe multivessel coronary artery disease with left main stenosis.  An Impella ventricular assist device was placed in the cath lab.  The patient is a candidate for urgent coronary artery bypass grafting.  The plan is to rest the patient on the Impella over the next 24 hours.  Coronary artery bypass grafting will be scheduled for 11/02/2023.    S/P CABG x 3  11/3/2023  The patient is postop day 1 status post urgent coronary artery bypass grafting x3.  Overall, the patient is doing well.    Neuro:  Patient is awake alert and oriented x3.  Neuro exam is nonfocal.  Patient is requiring p.o. narcotic medication supplemented with p.r.n. IV for pain control.  Patient's source of pain is is chronic back pain.  Patient has minimal incisional pain complaints.    Cardiac:  Patient has been hemodynamically stable.  Cardiac output has been in the excess of 5.5 L per minute.  Epi has been weaned to off.  Impella left ventricular assist device has been removed.  Respiratory:  Patient was extubated yesterday.  Patient having good sats on nasal cannula.  Continue pulmonary toileting.  Continue incentive spirometer.  GI:  Will advance patient's as tolerated.  LFTs that were elevated preoperatively continue to trend downward.  Renal:  Patient has good urine output.  Creatinine is 1.1.  Will start diuresis with Lasix.  Endocrine:  Glucose is controlled with an insulin drip.  Will convert to sliding scale.  Id:  Patient had a T-max of 101.7°.  White count is  14.  Suspect due to stress of surgery.  Will continue to follow.    Heme:  Hematocrit is 26.9 and platelet count is 132.  Will start aspirin and Plavix.  Activities:  Patient's is on bedrest for 3 hours status post Impella removal.  Will advance activities as tolerated.  Line tubes and drains:  Patient has a right IJ [place] and Cordis, Varela catheter, chest tubes, saphenectomy site JOHN drain, A-line and pacer wires    11/04/2023   The patient is postop day 2 status post urgent coronary artery bypass grafting x3.  Overall the patient is doing well.    Neuro:  Patient is awake alert and oriented x3.  Neuro exam is nonfocal.  Patient's pain is better controlled.    Cardiac:  Patient is off all drips.  Blood pressure has been stable.  Continue metoprolol and amiodarone.    Respiratory:  Patient has good sats on nasal cannula.  Continue pulmonary toileting.  Continue incentive spirometer.    GI:  Patient is tolerating a regular diet.  Advance as tolerated.  Renal:  Patient has good urine output.  Creatinine is stable.  Continue diuresis for now.    Endocrine: Glucose is controlled with a sliding scale.    Id:  Patient is afebrile and white count is 10.  Heme:  Hematocrit is 22 and platelet count is 95.  Patient is asymptomatic.  Continue aspirin and Plavix.    Activities:  Patient is out of bed and has ambulated.  Continue to advance as tolerated.  Line tubes and drains:  Patient has a right IJ [place] and Cordis which will be discontinued.  Varela catheter, A-line and pacer wire.    11/05/2023   The patient is postop day 3 status post urgent coronary artery bypass grafting x3.  Overall the patient is doing well.  Anticipate discharge home with home health in the next 48 hours.    Neuro:  Patient is awake alert and oriented x3.  Neuro exam is nonfocal.  Pain is much better controlled.  Will deescalate pain control to tramadol.    Cardiac:  Patient blood pressure has been stable continue metoprolol.  Respiratory:  Patient has  good sats on room air.  Continue pulmonary toileting.  Continue incentive spirometer.    GI:  Patient is tolerating a regular diet.    Renal:  Patient has good urine output.  Creatinine is stable at 1.0.  Magnesium is 2.0 and being replaced replaced.  Patient appears euvolemic.  Will discontinue Lasix.  Endocrine:  Glucose is well controlled.    Id:  Patient is afebrile and white count is 11.    Heme:  Hematocrit is 23 and platelet count is 157.  Continue aspirin and Plavix.    Activities: Patient is out of bed and ambulating.  Continue to advance as tolerated.  Line tubes and drains patient has pacer wires.    11/06/2023   The patient is postop day 4 status post coronary artery bypass grafting x3.  Overall the patient is doing well.  Anticipate discharge to home with home health in the next 24 hours.    Neuro:  Patient is awake alert and oriented x3.  Neuro exam is nonfocal.  Pain is well controlled.    Cardiac:  Patient is has a systolic blood pressure of 150.  Will add lisinopril.  Respiratory:  Patient has good sats on room air.  Continue pulmonary toileting.    GI:  Patient is tolerating a regular diet and has had bowel movements.  Renal:  Patient has good urine output.  Patient has bilateral pedal edema.  Will start p.o. Lasix.    Endocrine: Glucose is well controlled.  Id:  Patient is afebrile.    Heme:  Patient is on aspirin and Plavix.  Will add low-dose 2.5 mg b.i.d. of rivaroxaban for anti thrombin effect since he is status post an acute coronary syndrome.  Activities:  Patient is out of bed and ambulating.  Continue to advance as tolerated.  Line tubes and drains:  Patient has peripheral lines.    Left main coronary artery disease  11/03/2023   The patient is postop day 1 status post urgent coronary artery bypass grafting x3.  Overall, the patient is doing well.    Neuro:  Patient is awake alert and oriented x3.  Neuro exam is nonfocal.  Patient is requiring p.o. narcotic medication supplemented with  p.r.n. IV for pain control.  Patient's source of pain is is chronic back pain.  Patient has minimal incisional pain complaints.    Cardiac:  Patient has been hemodynamically stable.  Cardiac output has been in the excess of 5.5 L per minute.  Epi has been weaned to off.  Impella left ventricular assist device has been removed.  Respiratory:  Patient was extubated yesterday.  Patient having good sats on nasal cannula.  Continue pulmonary toileting.  Continue incentive spirometer.  GI:  Will advance patient's as tolerated.  LFTs that were elevated preoperatively continue to trend downward.  Renal:  Patient has good urine output.  Creatinine is 1.1.  Will start diuresis with Lasix.  Endocrine:  Glucose is controlled with an insulin drip.  Will convert to sliding scale.  Id:  Patient had a T-max of 101.7°.  White count is 14.  Suspect due to stress of surgery.  Will continue to follow.    Heme:  Hematocrit is 26.9 and platelet count is 132.  Will start aspirin and Plavix.  Activities:  Patient's is on bedrest for 3 hours status post Impella removal.  Will advance activities as tolerated.  Line tubes and drains:  Patient has a right IJ [place] and Cordis, Varela catheter, chest tubes, saphenectomy site JOHN drain, A-line and pacer wires          Oscar LOUISA Cazares MD  Cardiothoracic Surgery  [facility] ([facility])

## 2023-11-06 NOTE — ASSESSMENT & PLAN NOTE
Significant CAD, EKG shows anterior and lateral ischemia  Plan nitrates if possible  1 st troponin negative and will trend  Likely cardiac event was VT  Cardiac echo shows  Preserved LV function    11/6/23  -Recovering well s/p CABG

## 2023-11-06 NOTE — SUBJECTIVE & OBJECTIVE
Interval History:  The patient is postop day 4 status post urgent coronary artery bypass grafting x3.    ROS  Medications:  Continuous Infusions:  Scheduled Meds:   acetaminophen  650 mg Oral Q6H    amiodarone  200 mg Oral BID    ascorbic acid (vitamin C)  500 mg Oral BID    aspirin  81 mg Oral Daily    atorvastatin  80 mg Oral Daily    chlorhexidine  10 mL Mouth/Throat BID    clopidogreL  75 mg Oral Daily    cyanocobalamin  1,000 mcg Oral Daily    docusate sodium  100 mg Oral BID    ferrous sulfate  1 tablet Oral Daily    folic acid  1 mg Oral Daily    furosemide  40 mg Oral BID    lisinopriL  5 mg Oral Daily    magnesium hydroxide 400 mg/5 ml  5 mL Oral BID    metoprolol tartrate  25 mg Oral BID    pantoprazole  40 mg Intravenous Daily    polyethylene glycol  17 g Oral Daily    potassium chloride  10 mEq Oral BID    rivaroxaban  2.5 mg Oral BID WM     PRN Meds:sodium chloride 0.9%, acetaminophen, albumin human 5%, calcium gluconate IVPB, calcium gluconate IVPB, calcium gluconate IVPB, dextrose 10%, dextrose 10%, glucagon (human recombinant), glucose, glucose, hydrALAZINE, influenza 65up-adj, insulin aspart U-100, lactated ringers, magnesium sulfate IVPB, magnesium sulfate IVPB, magnesium sulfate IVPB, metoclopramide HCl, ondansetron, potassium chloride in water, potassium chloride in water, potassium chloride in water, sodium chloride 0.9%, sodium chloride 0.9%, sodium phosphate 15 mmol in dextrose 5 % (D5W) 250 mL IVPB, sodium phosphate 20.01 mmol in dextrose 5 % (D5W) 250 mL IVPB, sodium phosphate 30 mmol in dextrose 5 % (D5W) 250 mL IVPB, traMADoL     Objective:     Vital Signs (Most Recent):  Temp: 97.9 °F (36.6 °C) (11/06/23 0702)  Pulse: 72 (11/06/23 0909)  Resp: 18 (11/06/23 0813)  BP: (!) 150/62 (11/06/23 0928)  SpO2: 98 % (11/06/23 0813) Vital Signs (24h Range):  Temp:  [97.8 °F (36.6 °C)-98.6 °F (37 °C)] 97.9 °F (36.6 °C)  Pulse:  [72-92] 72  Resp:  [14-39] 18  SpO2:  [95 %-98 %] 98 %  BP:  (105-150)/(54-67) 150/62     Weight: 99.1 kg (218 lb 7.6 oz)  Body mass index is 27.31 kg/m².    SpO2: 98 %       Intake/Output - Last 3 Shifts         11/04 0700 11/05 0659 11/05 0700 11/06 0659 11/06 0700 11/07 0659    P.O.  320     I.V. (mL/kg) 39.1 (0.4) 35.5 (0.4)     IV Piggyback       Total Intake(mL/kg) 39.1 (0.4) 355.5 (3.6)     Urine (mL/kg/hr) 2995 (1.3) 300 (0.1)     Drains       Stool  0     Chest Tube       Total Output 2995 300     Net -2956 +55.5            Stool Occurrence  2 x             Lines/Drains/Airways       Peripheral Intravenous Line  Duration                  Peripheral IV - Single Lumen 11/04/23 1501 20 G Anterior;Left Upper Arm 1 day         Peripheral IV - Single Lumen 11/04/23 1501 20 G Left Antecubital 1 day                     Physical Exam  Constitutional:       Appearance: Normal appearance.   HENT:      Head: Normocephalic and atraumatic.   Cardiovascular:      Rate and Rhythm: Normal rate and regular rhythm.      Heart sounds: Normal heart sounds.   Pulmonary:      Effort: Pulmonary effort is normal.      Breath sounds: Normal breath sounds.   Abdominal:      General: Abdomen is flat. Bowel sounds are normal.      Palpations: Abdomen is soft.   Musculoskeletal:      Right lower leg: Edema present.      Left lower leg: Edema present.   Skin:     General: Skin is warm and dry.   Neurological:      Mental Status: He is alert and oriented to person, place, and time.   Psychiatric:         Behavior: Behavior normal.            Significant Labs:  All pertinent labs from the last 24 hours have been reviewed.    Significant Diagnostics:  I have reviewed all pertinent imaging results/findings within the past 24 hours.

## 2023-11-06 NOTE — PLAN OF CARE
Patient tolerated intervention well. Functional mobility x200ft without AD and SBA. Recommending low intensity intervention at d/c.

## 2023-11-07 VITALS
OXYGEN SATURATION: 90 % | BODY MASS INDEX: 27.17 KG/M2 | TEMPERATURE: 99 F | HEIGHT: 75 IN | DIASTOLIC BLOOD PRESSURE: 58 MMHG | HEART RATE: 80 BPM | RESPIRATION RATE: 18 BRPM | SYSTOLIC BLOOD PRESSURE: 120 MMHG | WEIGHT: 218.5 LBS

## 2023-11-07 LAB
ANION GAP SERPL CALC-SCNC: 10 MMOL/L (ref 8–16)
BASOPHILS # BLD AUTO: 0.04 K/UL (ref 0–0.2)
BASOPHILS NFR BLD: 0.3 % (ref 0–1.9)
BUN SERPL-MCNC: 24 MG/DL (ref 8–23)
CALCIUM SERPL-MCNC: 8.3 MG/DL (ref 8.7–10.5)
CHLORIDE SERPL-SCNC: 100 MMOL/L (ref 95–110)
CO2 SERPL-SCNC: 28 MMOL/L (ref 23–29)
CREAT SERPL-MCNC: 1.1 MG/DL (ref 0.5–1.4)
DIFFERENTIAL METHOD: ABNORMAL
EOSINOPHIL # BLD AUTO: 0.2 K/UL (ref 0–0.5)
EOSINOPHIL NFR BLD: 1.4 % (ref 0–8)
ERYTHROCYTE [DISTWIDTH] IN BLOOD BY AUTOMATED COUNT: 14.5 % (ref 11.5–14.5)
EST. GFR  (NO RACE VARIABLE): >60 ML/MIN/1.73 M^2
GLUCOSE SERPL-MCNC: 107 MG/DL (ref 70–110)
HCT VFR BLD AUTO: 25.8 % (ref 40–54)
HGB BLD-MCNC: 8 G/DL (ref 14–18)
IMM GRANULOCYTES # BLD AUTO: 0.4 K/UL (ref 0–0.04)
IMM GRANULOCYTES NFR BLD AUTO: 3.5 % (ref 0–0.5)
LYMPHOCYTES # BLD AUTO: 1.5 K/UL (ref 1–4.8)
LYMPHOCYTES NFR BLD: 12.6 % (ref 18–48)
MAGNESIUM SERPL-MCNC: 2.2 MG/DL (ref 1.6–2.6)
MCH RBC QN AUTO: 29.2 PG (ref 27–31)
MCHC RBC AUTO-ENTMCNC: 31 G/DL (ref 32–36)
MCV RBC AUTO: 94 FL (ref 82–98)
MONOCYTES # BLD AUTO: 0.9 K/UL (ref 0.3–1)
MONOCYTES NFR BLD: 7.5 % (ref 4–15)
NEUTROPHILS # BLD AUTO: 8.6 K/UL (ref 1.8–7.7)
NEUTROPHILS NFR BLD: 74.7 % (ref 38–73)
NRBC BLD-RTO: 0 /100 WBC
PLATELET # BLD AUTO: 342 K/UL (ref 150–450)
PLATELET BLD QL SMEAR: ABNORMAL
PMV BLD AUTO: 9.7 FL (ref 9.2–12.9)
POTASSIUM SERPL-SCNC: 4.2 MMOL/L (ref 3.5–5.1)
RBC # BLD AUTO: 2.74 M/UL (ref 4.6–6.2)
SODIUM SERPL-SCNC: 138 MMOL/L (ref 136–145)
WBC # BLD AUTO: 11.53 K/UL (ref 3.9–12.7)

## 2023-11-07 PROCEDURE — 99232 PR SUBSEQUENT HOSPITAL CARE,LEVL II: ICD-10-PCS | Mod: ,,, | Performed by: PHYSICIAN ASSISTANT

## 2023-11-07 PROCEDURE — 25000003 PHARM REV CODE 250: Performed by: INTERNAL MEDICINE

## 2023-11-07 PROCEDURE — 97116 GAIT TRAINING THERAPY: CPT

## 2023-11-07 PROCEDURE — 99232 SBSQ HOSP IP/OBS MODERATE 35: CPT | Mod: ,,, | Performed by: PHYSICIAN ASSISTANT

## 2023-11-07 PROCEDURE — 94664 DEMO&/EVAL PT USE INHALER: CPT

## 2023-11-07 PROCEDURE — 94799 UNLISTED PULMONARY SVC/PX: CPT | Mod: XB

## 2023-11-07 PROCEDURE — 36415 COLL VENOUS BLD VENIPUNCTURE: CPT | Performed by: THORACIC SURGERY (CARDIOTHORACIC VASCULAR SURGERY)

## 2023-11-07 PROCEDURE — 97535 SELF CARE MNGMENT TRAINING: CPT

## 2023-11-07 PROCEDURE — 85025 COMPLETE CBC W/AUTO DIFF WBC: CPT | Performed by: THORACIC SURGERY (CARDIOTHORACIC VASCULAR SURGERY)

## 2023-11-07 PROCEDURE — 97530 THERAPEUTIC ACTIVITIES: CPT

## 2023-11-07 PROCEDURE — 25000003 PHARM REV CODE 250: Performed by: THORACIC SURGERY (CARDIOTHORACIC VASCULAR SURGERY)

## 2023-11-07 PROCEDURE — 27000646 HC AEROBIKA DEVICE

## 2023-11-07 PROCEDURE — 99900035 HC TECH TIME PER 15 MIN (STAT)

## 2023-11-07 PROCEDURE — 80048 BASIC METABOLIC PNL TOTAL CA: CPT | Performed by: THORACIC SURGERY (CARDIOTHORACIC VASCULAR SURGERY)

## 2023-11-07 PROCEDURE — 83735 ASSAY OF MAGNESIUM: CPT | Performed by: THORACIC SURGERY (CARDIOTHORACIC VASCULAR SURGERY)

## 2023-11-07 RX ORDER — CLOPIDOGREL BISULFATE 75 MG/1
75 TABLET ORAL DAILY
Qty: 90 TABLET | Refills: 3 | Status: SHIPPED | OUTPATIENT
Start: 2023-11-08 | End: 2024-02-06 | Stop reason: SDUPTHER

## 2023-11-07 RX ORDER — FOLIC ACID 1 MG/1
1 TABLET ORAL DAILY
Qty: 30 TABLET | Refills: 0 | Status: SHIPPED | OUTPATIENT
Start: 2023-11-08 | End: 2023-12-08

## 2023-11-07 RX ORDER — POTASSIUM CHLORIDE 750 MG/1
10 TABLET, EXTENDED RELEASE ORAL 2 TIMES DAILY
Qty: 14 TABLET | Refills: 0 | Status: SHIPPED | OUTPATIENT
Start: 2023-11-07 | End: 2023-11-14

## 2023-11-07 RX ORDER — ATORVASTATIN CALCIUM 80 MG/1
80 TABLET, FILM COATED ORAL DAILY
Qty: 90 TABLET | Refills: 3 | Status: SHIPPED | OUTPATIENT
Start: 2023-11-08 | End: 2024-11-07

## 2023-11-07 RX ORDER — ASCORBIC ACID 500 MG
500 TABLET ORAL 2 TIMES DAILY
Qty: 60 TABLET | Refills: 0 | Status: SHIPPED | OUTPATIENT
Start: 2023-11-07 | End: 2023-12-07

## 2023-11-07 RX ORDER — ASPIRIN 81 MG/1
81 TABLET ORAL DAILY
Qty: 90 TABLET | Refills: 3 | Status: SHIPPED | OUTPATIENT
Start: 2023-11-08 | End: 2024-11-07

## 2023-11-07 RX ORDER — ADHESIVE BANDAGE
5 BANDAGE TOPICAL 2 TIMES DAILY
Qty: 300 ML | Refills: 0 | Status: SHIPPED | OUTPATIENT
Start: 2023-11-07 | End: 2023-12-07

## 2023-11-07 RX ORDER — PANTOPRAZOLE SODIUM 40 MG/1
40 TABLET, DELAYED RELEASE ORAL DAILY
Qty: 90 TABLET | Refills: 0 | Status: SHIPPED | OUTPATIENT
Start: 2023-11-08 | End: 2024-02-06

## 2023-11-07 RX ORDER — FERROUS SULFATE 325(65) MG
325 TABLET, DELAYED RELEASE (ENTERIC COATED) ORAL DAILY
Qty: 30 TABLET | Refills: 0 | Status: SHIPPED | OUTPATIENT
Start: 2023-11-07 | End: 2023-12-07

## 2023-11-07 RX ORDER — LANOLIN ALCOHOL/MO/W.PET/CERES
1000 CREAM (GRAM) TOPICAL DAILY
Qty: 30 TABLET | Refills: 0 | Status: SHIPPED | OUTPATIENT
Start: 2023-11-08 | End: 2023-12-08

## 2023-11-07 RX ORDER — PANTOPRAZOLE SODIUM 40 MG/1
40 TABLET, DELAYED RELEASE ORAL DAILY
Status: DISCONTINUED | OUTPATIENT
Start: 2023-11-07 | End: 2023-11-07 | Stop reason: HOSPADM

## 2023-11-07 RX ORDER — AMIODARONE HYDROCHLORIDE 200 MG/1
200 TABLET ORAL 2 TIMES DAILY
Qty: 60 TABLET | Refills: 0 | Status: SHIPPED | OUTPATIENT
Start: 2023-11-07 | End: 2023-12-26 | Stop reason: ALTCHOICE

## 2023-11-07 RX ORDER — DOCUSATE SODIUM 100 MG/1
100 CAPSULE, LIQUID FILLED ORAL 2 TIMES DAILY
Qty: 60 CAPSULE | Refills: 0 | Status: SHIPPED | OUTPATIENT
Start: 2023-11-07 | End: 2023-12-07

## 2023-11-07 RX ORDER — FUROSEMIDE 40 MG/1
40 TABLET ORAL 2 TIMES DAILY
Qty: 14 TABLET | Refills: 0 | Status: SHIPPED | OUTPATIENT
Start: 2023-11-07 | End: 2023-11-14

## 2023-11-07 RX ORDER — POLYETHYLENE GLYCOL 3350 17 G/17G
17 POWDER, FOR SOLUTION ORAL DAILY
Qty: 510 G | Refills: 0 | Status: SHIPPED | OUTPATIENT
Start: 2023-11-08 | End: 2023-12-08

## 2023-11-07 RX ORDER — LISINOPRIL 5 MG/1
5 TABLET ORAL DAILY
Qty: 30 TABLET | Refills: 0 | Status: SHIPPED | OUTPATIENT
Start: 2023-11-08 | End: 2023-12-06 | Stop reason: SDUPTHER

## 2023-11-07 RX ADMIN — TRAMADOL HYDROCHLORIDE 50 MG: 50 TABLET, COATED ORAL at 06:11

## 2023-11-07 RX ADMIN — RIVAROXABAN 2.5 MG: 2.5 TABLET, FILM COATED ORAL at 08:11

## 2023-11-07 RX ADMIN — FOLIC ACID 1 MG: 1 TABLET ORAL at 08:11

## 2023-11-07 RX ADMIN — CHLORHEXIDINE GLUCONATE 0.12% ORAL RINSE 10 ML: 1.2 LIQUID ORAL at 08:11

## 2023-11-07 RX ADMIN — LISINOPRIL 5 MG: 5 TABLET ORAL at 08:11

## 2023-11-07 RX ADMIN — AMIODARONE HYDROCHLORIDE 200 MG: 200 TABLET ORAL at 08:11

## 2023-11-07 RX ADMIN — CLOPIDOGREL BISULFATE 75 MG: 75 TABLET ORAL at 08:11

## 2023-11-07 RX ADMIN — CYANOCOBALAMIN TAB 1000 MCG 1000 MCG: 1000 TAB at 08:11

## 2023-11-07 RX ADMIN — ASPIRIN 81 MG: 81 TABLET, COATED ORAL at 08:11

## 2023-11-07 RX ADMIN — FUROSEMIDE 40 MG: 40 TABLET ORAL at 08:11

## 2023-11-07 RX ADMIN — ATORVASTATIN CALCIUM 80 MG: 40 TABLET, FILM COATED ORAL at 08:11

## 2023-11-07 RX ADMIN — POTASSIUM CHLORIDE 10 MEQ: 750 TABLET, EXTENDED RELEASE ORAL at 08:11

## 2023-11-07 RX ADMIN — PANTOPRAZOLE SODIUM 40 MG: 40 TABLET, DELAYED RELEASE ORAL at 08:11

## 2023-11-07 RX ADMIN — FERROUS SULFATE TAB 325 MG (65 MG ELEMENTAL FE) 1 EACH: 325 (65 FE) TAB at 08:11

## 2023-11-07 RX ADMIN — METOPROLOL TARTRATE 25 MG: 25 TABLET, FILM COATED ORAL at 08:11

## 2023-11-07 RX ADMIN — OXYCODONE HYDROCHLORIDE AND ACETAMINOPHEN 500 MG: 500 TABLET ORAL at 08:11

## 2023-11-07 NOTE — PROGRESS NOTES
[facility] Telemetry [facility])  Cardiothoracic Surgery  Progress Note    Patient Name: Carlin Isaac  MRN: [id number]  Admission Date: 10/28/2023  Hospital Length of Stay: 10 days  Code Status: Full Code   Attending Physician: Oscar Cazares MD   Referring Provider: Self, Aaareferral  Principal Problem:Cardiac arrest            Subjective:     Post-Op Info:  Procedure(s) (LRB):  CORONARY ARTERY BYPASS GRAFT (CABG) (N/A)  ECHOCARDIOGRAM,TRANSESOPHAGEAL (N/A)  SURGICAL PROCUREMENT, VEIN, ENDOSCOPIC (Left)  BLOCK, NERVE, INTERCOSTAL, 2 OR MORE (N/A)   5 Days Post-Op     Interval History: The patient is postop day 5 status post urgent coronary artery bypass grafting x3.    ROS  Medications:  Continuous Infusions:  Scheduled Meds:   amiodarone  200 mg Oral BID    ascorbic acid (vitamin C)  500 mg Oral BID    aspirin  81 mg Oral Daily    atorvastatin  80 mg Oral Daily    chlorhexidine  10 mL Mouth/Throat BID    clopidogreL  75 mg Oral Daily    cyanocobalamin  1,000 mcg Oral Daily    docusate sodium  100 mg Oral BID    ferrous sulfate  1 tablet Oral Daily    folic acid  1 mg Oral Daily    furosemide  40 mg Oral BID    lisinopriL  5 mg Oral Daily    magnesium hydroxide 400 mg/5 ml  5 mL Oral BID    metoprolol tartrate  25 mg Oral BID    pantoprazole  40 mg Oral Daily    polyethylene glycol  17 g Oral Daily    potassium chloride  10 mEq Oral BID    rivaroxaban  2.5 mg Oral BID WM     PRN Meds:sodium chloride 0.9%, acetaminophen, albumin human 5%, calcium gluconate IVPB, calcium gluconate IVPB, calcium gluconate IVPB, hydrALAZINE, influenza 65up-adj, lactated ringers, magnesium sulfate IVPB, magnesium sulfate IVPB, magnesium sulfate IVPB, metoclopramide HCl, ondansetron, potassium chloride in water, potassium chloride in water, potassium chloride in water, sodium chloride 0.9%, sodium chloride 0.9%, sodium phosphate 15 mmol in dextrose 5 % (D5W) 250 mL IVPB, sodium phosphate 20.01 mmol in dextrose 5 % (D5W)  250 mL IVPB, sodium phosphate 30 mmol in dextrose 5 % (D5W) 250 mL IVPB, traMADoL     Objective:     Vital Signs (Most Recent):  Temp: 97.4 °F (36.3 °C) (11/07/23 0720)  Pulse: 73 (11/07/23 0800)  Resp: 17 (11/07/23 0603)  BP: 127/68 (11/07/23 0720)  SpO2: 98 % (11/07/23 0720) Vital Signs (24h Range):  Temp:  [97.4 °F (36.3 °C)-98.7 °F (37.1 °C)] 97.4 °F (36.3 °C)  Pulse:  [62-81] 73  Resp:  [17-19] 17  SpO2:  [94 %-100 %] 98 %  BP: (100-164)/(53-76) 127/68     Weight: 99.1 kg (218 lb 7.6 oz)  Body mass index is 27.31 kg/m².    SpO2: 98 %       Intake/Output - Last 3 Shifts         11/05 0700 11/06 0659 11/06 0700 11/07 0659 11/07 0700 11/08 0659    P.O. 320 240     I.V. (mL/kg) 35.5 (0.4) 102.3 (1)     Total Intake(mL/kg) 355.5 (3.6) 342.3 (3.5)     Urine (mL/kg/hr) 300 (0.1) 500 (0.2)     Stool 0      Total Output 300 500     Net +55.5 -157.8            Stool Occurrence 2 x              Lines/Drains/Airways       Peripheral Intravenous Line  Duration                  Peripheral IV - Single Lumen 11/04/23 1501 20 G Anterior;Left Upper Arm 2 days         Peripheral IV - Single Lumen 11/04/23 1501 20 G Left Antecubital 2 days                     Physical Exam  Constitutional:       Appearance: Normal appearance.   HENT:      Head: Normocephalic and atraumatic.   Cardiovascular:      Rate and Rhythm: Normal rate and regular rhythm.      Heart sounds: Normal heart sounds.   Pulmonary:      Effort: Pulmonary effort is normal.      Breath sounds: Normal breath sounds.   Abdominal:      General: Abdomen is flat. Bowel sounds are normal.      Palpations: Abdomen is soft.   Musculoskeletal:      Right lower leg: Edema present.      Left lower leg: Edema present.   Skin:     General: Skin is warm and dry.   Neurological:      General: No focal deficit present.      Mental Status: He is alert and oriented to person, place, and time.   Psychiatric:         Mood and Affect: Mood normal.         Behavior: Behavior normal.             Significant Labs:  All pertinent labs from the last 24 hours have been reviewed.    Significant Diagnostics:  I have reviewed all pertinent imaging results/findings within the past 24 hours.    Assessment/Plan:     * Cardiac arrest  The patient is a 68-year-old male with coronary artery disease who is status post cardiac arrest.  Cardiac catheterization shows severe multivessel coronary artery disease with left main stenosis.  An Impella ventricular assist device was placed in the cath lab.  The patient is a candidate for urgent coronary artery bypass grafting.  The plan is to rest the patient on the Impella over the next 24 hours.  Coronary artery bypass grafting will be scheduled for 11/02/2023.    S/P CABG x 3  11/3/2023  The patient is postop day 1 status post urgent coronary artery bypass grafting x3.  Overall, the patient is doing well.    Neuro:  Patient is awake alert and oriented x3.  Neuro exam is nonfocal.  Patient is requiring p.o. narcotic medication supplemented with p.r.n. IV for pain control.  Patient's source of pain is is chronic back pain.  Patient has minimal incisional pain complaints.    Cardiac:  Patient has been hemodynamically stable.  Cardiac output has been in the excess of 5.5 L per minute.  Epi has been weaned to off.  Impella left ventricular assist device has been removed.  Respiratory:  Patient was extubated yesterday.  Patient having good sats on nasal cannula.  Continue pulmonary toileting.  Continue incentive spirometer.  GI:  Will advance patient's as tolerated.  LFTs that were elevated preoperatively continue to trend downward.  Renal:  Patient has good urine output.  Creatinine is 1.1.  Will start diuresis with Lasix.  Endocrine:  Glucose is controlled with an insulin drip.  Will convert to sliding scale.  Id:  Patient had a T-max of 101.7°.  White count is 14.  Suspect due to stress of surgery.  Will continue to follow.    Heme:  Hematocrit is 26.9 and platelet count  is 132.  Will start aspirin and Plavix.  Activities:  Patient's is on bedrest for 3 hours status post Impella removal.  Will advance activities as tolerated.  Line tubes and drains:  Patient has a right IJ Hadley and Cordis, Varela catheter, chest tubes, saphenectomy site JOHN drain, A-line and pacer wires    11/04/2023   The patient is postop day 2 status post urgent coronary artery bypass grafting x3.  Overall the patient is doing well.    Neuro:  Patient is awake alert and oriented x3.  Neuro exam is nonfocal.  Patient's pain is better controlled.    Cardiac:  Patient is off all drips.  Blood pressure has been stable.  Continue metoprolol and amiodarone.    Respiratory:  Patient has good sats on nasal cannula.  Continue pulmonary toileting.  Continue incentive spirometer.    GI:  Patient is tolerating a regular diet.  Advance as tolerated.  Renal:  Patient has good urine output.  Creatinine is stable.  Continue diuresis for now.    Endocrine: Glucose is controlled with a sliding scale.    Id:  Patient is afebrile and white count is 10.  Heme:  Hematocrit is 22 and platelet count is 95.  Patient is asymptomatic.  Continue aspirin and Plavix.    Activities:  Patient is out of bed and has ambulated.  Continue to advance as tolerated.  Line tubes and drains:  Patient has a right IJ Hadley and Cordis which will be discontinued.  Varela catheter, A-line and pacer wire.    11/05/2023   The patient is postop day 3 status post urgent coronary artery bypass grafting x3.  Overall the patient is doing well.  Anticipate discharge home with home health in the next 48 hours.    Neuro:  Patient is awake alert and oriented x3.  Neuro exam is nonfocal.  Pain is much better controlled.  Will deescalate pain control to tramadol.    Cardiac:  Patient blood pressure has been stable continue metoprolol.  Respiratory:  Patient has good sats on room air.  Continue pulmonary toileting.  Continue incentive spirometer.    GI:  Patient is tolerating  a regular diet.    Renal:  Patient has good urine output.  Creatinine is stable at 1.0.  Magnesium is 2.0 and being replaced replaced.  Patient appears euvolemic.  Will discontinue Lasix.  Endocrine:  Glucose is well controlled.    Id:  Patient is afebrile and white count is 11.    Heme:  Hematocrit is 23 and platelet count is 157.  Continue aspirin and Plavix.    Activities: Patient is out of bed and ambulating.  Continue to advance as tolerated.  Line tubes and drains patient has pacer wires.    11/06/2023   The patient is postop day 4 status post coronary artery bypass grafting x3.  Overall the patient is doing well.  Anticipate discharge to home with home health in the next 24 hours.    Neuro:  Patient is awake alert and oriented x3.  Neuro exam is nonfocal.  Pain is well controlled.    Cardiac:  Patient is has a systolic blood pressure of 150.  Will add lisinopril.  Respiratory:  Patient has good sats on room air.  Continue pulmonary toileting.    GI:  Patient is tolerating a regular diet and has had bowel movements.  Renal:  Patient has good urine output.  Patient has bilateral pedal edema.  Will start p.o. Lasix.    Endocrine: Glucose is well controlled.  Id:  Patient is afebrile.    Heme:  Patient is on aspirin and Plavix.  Will add low-dose 2.5 mg b.i.d. of rivaroxaban for anti thrombin effect since he is status post an acute coronary syndrome.  Activities:  Patient is out of bed and ambulating.  Continue to advance as tolerated.  Line tubes and drains:  Patient has peripheral lines.    11/07/2023   The patient is postop day 5 status post coronary artery bypass grafting x3.  Overall the patient is doing well.  Patient will be discharged home with home health today.  Neuro:  Patient is awake alert and oriented x3.  Neuro exam is nonfocal.  Pain is well controlled.  Prior to surgery, patient was being managed at a pain clinic.  Patient will follow-up with his regular pain clinic doctor for further pain  management.  Cardiac:  Patient is hemodynamically stable.  Continue metoprolol and lisinopril.  Continue amiodarone.    Respiratory:  Patient has good sats on room air.  Continue pulmonary toileting.    GI:  Patient is tolerating a regular diet and is having bowel movements.    Renal:  Patient has good urine output.  Creatinine is stable.  Edema is improved.  Will continue p.o. Lasix for 7 more days.  Endocrine: Glucose is controlled.    Id:  Patient is afebrile and white count is 11.  Heme:  Hematocrit is 25.  Continue aspirin Plavix.  Continue low-dose 2.5 mg b.i.d. rivaroxaban for anti thrombin effect.  The risks and benefits of adding rivaroxaban 2 double antiplatelet therapy was discussed with the patient.  The patient understands the risks of bleeding.  He understands that he will seek medical attention in the event of abnormal bleeding.  Activities:  Patient is out of bed and ambulating.  Advance as tolerated.    Line tubes and drains:  Patient has peripheral lines.    Left main coronary artery disease  11/03/2023   The patient is postop day 1 status post urgent coronary artery bypass grafting x3.  Overall, the patient is doing well.    Neuro:  Patient is awake alert and oriented x3.  Neuro exam is nonfocal.  Patient is requiring p.o. narcotic medication supplemented with p.r.n. IV for pain control.  Patient's source of pain is is chronic back pain.  Patient has minimal incisional pain complaints.    Cardiac:  Patient has been hemodynamically stable.  Cardiac output has been in the excess of 5.5 L per minute.  Epi has been weaned to off.  Impella left ventricular assist device has been removed.  Respiratory:  Patient was extubated yesterday.  Patient having good sats on nasal cannula.  Continue pulmonary toileting.  Continue incentive spirometer.  GI:  Will advance patient's as tolerated.  LFTs that were elevated preoperatively continue to trend downward.  Renal:  Patient has good urine output.  Creatinine is  1.1.  Will start diuresis with Lasix.  Endocrine:  Glucose is controlled with an insulin drip.  Will convert to sliding scale.  Id:  Patient had a T-max of 101.7°.  White count is 14.  Suspect due to stress of surgery.  Will continue to follow.    Heme:  Hematocrit is 26.9 and platelet count is 132.  Will start aspirin and Plavix.  Activities:  Patient's is on bedrest for 3 hours status post Impella removal.  Will advance activities as tolerated.  Line tubes and drains:  Patient has a right IJ Ty Ty and Cordis, Varela catheter, chest tubes, saphenectomy site JOHN drain, A-line and pacer wires          Oscar LOUISA Cazares MD  Cardiothoracic Surgery  [facility] - Telemetry ([facility])

## 2023-11-07 NOTE — PLAN OF CARE
Problem: Infection  Goal: Absence of Infection Signs and Symptoms  Outcome: Met     Problem: Adult Inpatient Plan of Care  Goal: Plan of Care Review  Outcome: Met  Goal: Patient-Specific Goal (Individualized)  Outcome: Met  Goal: Absence of Hospital-Acquired Illness or Injury  Outcome: Met  Goal: Optimal Comfort and Wellbeing  Outcome: Met  Goal: Readiness for Transition of Care  Outcome: Met     Problem: Fluid and Electrolyte Imbalance (Acute Kidney Injury/Impairment)  Goal: Fluid and Electrolyte Balance  Outcome: Met     Problem: Oral Intake Inadequate (Acute Kidney Injury/Impairment)  Goal: Optimal Nutrition Intake  Outcome: Met     Problem: Renal Function Impairment (Acute Kidney Injury/Impairment)  Goal: Effective Renal Function  Outcome: Met     Problem: Skin Injury Risk Increased  Goal: Skin Health and Integrity  Outcome: Met     Problem: Fall Injury Risk  Goal: Absence of Fall and Fall-Related Injury  Outcome: Met     Problem: Adjustment to Device (Cardiac Rhythm Management Device)  Goal: Optimal Adjustment to Device  Outcome: Met     Problem: Bleeding (Cardiac Rhythm Management Device)  Goal: Absence of Bleeding  Outcome: Met     Problem: Device-Related Complication Risk (Cardiac Rhythm Management Device)  Goal: Effective Device Function  Outcome: Met     Problem: Infection (Cardiac Rhythm Management Device)  Goal: Absence of Infection Signs and Symptoms  Outcome: Met     Problem: Pain (Cardiac Rhythm Management Device)  Goal: Acceptable Pain Level  Outcome: Met     Problem: Respiratory Compromise (Cardiac Rhythm Management Device)  Goal: Effective Oxygenation and Ventilation  Outcome: Met     Problem: Activity Intolerance (Cardiovascular Surgery)  Goal: Improved Activity Tolerance  Outcome: Met     Problem: Adjustment to Surgery (Cardiovascular Surgery)  Goal: Optimal Coping with Heart Surgery  Outcome: Met     Problem: Bleeding (Cardiovascular Surgery)  Goal: Bleeding (Cardiovascular Surgery)  Outcome:  Met     Problem: Cardiac Function Impaired (Cardiovascular Surgery)  Goal: Effective Cardiac Function  Outcome: Met     Problem: Cerebral Tissue Perfusion (Cardiovascular Surgery)  Goal: Optimal Cerebral Tissue Perfusion (Cardiovascular Surgery)  Outcome: Met     Problem: Fluid and Electrolyte Imbalance (Cardiovascular Surgery)  Goal: Fluid and Electrolyte Balance (Cardiovascular Surgery)  Outcome: Met     Problem: Glycemic Control Impaired (Cardiovascular Surgery)  Goal: Blood Glucose Level Within Targeted Range (Cardiovascular Surgery)  Outcome: Met     Problem: Infection (Cardiovascular Surgery)  Goal: Absence of Infection Signs and Symptoms  Outcome: Met     Problem: Ongoing Anesthesia Effects (Cardiovascular Surgery)  Goal: Anesthesia/Sedation Recovery  Outcome: Met     Problem: Pain (Cardiovascular Surgery)  Goal: Acceptable Pain Control  Outcome: Met     Problem: Postoperative Nausea and Vomiting (Cardiovascular Surgery)  Goal: Nausea and Vomiting Relief (Cardiovascular Surgery)  Outcome: Met     Problem: Postoperative Urinary Retention (Cardiovascular Surgery)  Goal: Effective Urinary Elimination (Cardiovascular Surgery)  Outcome: Met     Problem: Respiratory Compromise (Cardiovascular Surgery)  Goal: Effective Oxygenation and Ventilation (Cardiovascular Surgery)  Outcome: Met

## 2023-11-07 NOTE — ASSESSMENT & PLAN NOTE
Significant CAD, EKG shows anterior and lateral ischemia  Plan nitrates if possible  1 st troponin negative and will trend  Likely cardiac event was VT  Cardiac echo shows  Preserved LV function    11/6/23  -Recovering well s/p CABG    11/7/23  -Stable post CABG  -Continue ASA, Plavix, statin, BB, ACEi

## 2023-11-07 NOTE — PROGRESS NOTES
Pharmacist Intervention IV to PO Note    Carlin Isaac is a 68 y.o. male being treated with IV medication pantoprazole.    Patient Data:    Vital Signs (Most Recent):  Temp: 98.5 °F (36.9 °C) (11/06/23 1953)  Pulse: 62 (11/06/23 2252)  Resp: 17 (11/06/23 2001)  BP: (!) 131/59 (11/06/23 1953)  SpO2: 98 % (11/06/23 1953) Vital Signs (72h Range):  Temp:  [97.8 °F (36.6 °C)-100 °F (37.8 °C)]   Pulse:  [62-96]   Resp:  [14-47]   BP: (102-164)/(53-76)   SpO2:  [94 %-100 %]   Arterial Line BP: ()/(40-69)      CBC:  Recent Labs   Lab 11/03/23 0418 11/04/23  0454 11/05/23  0517   WBC 14.22* 10.10 11.17   RBC 3.03* 2.51* 2.58*   HGB 9.0* 7.4* 7.7*   HCT 26.9* 22.3* 23.3*   * 94* 157   MCV 89 89 90   MCH 29.7 29.5 29.8   MCHC 33.5 33.2 33.0     CMP:     Recent Labs   Lab 11/03/23  0418 11/03/23  1632 11/04/23  0454 11/05/23  0517   * 133* 143* 114*   CALCIUM 7.7* 7.9* 8.4* 8.1*   ALBUMIN 2.7*  --  2.9* 2.8*   PROT 4.5*  --  4.9* 5.1*    138 138 137   K 4.4 4.1 4.3 4.1   CO2 26 25 26 31*    104 101 98   BUN 20 22 27* 30*   CREATININE 1.1 1.0 1.0 1.0   ALKPHOS 31*  --  33* 46*   ALT 56*  --  44 55*   AST 54*  --  49* 47*   BILITOT 0.7  --  0.5 0.4       Dietary Orders:  Diet Orders            Diet Cardiac Low Sodium,2gm; Fluid - 2000mL; Standard Tray: Cardiac starting at 11/06 0955    Dietary nutrition supplements Boost Plus - Chocolate starting at 11/03 1300            Based on the following criteria, this patient qualifies for intravenous to oral conversion:  [x] The patients gastrointestinal tract is functioning (tolerating medications via oral or enteral route for 24 hours and tolerating food or enteral feeds for 24 hours).  [x] The patient is hemodynamically stable for 24 hours (heart rate <100 beats per minute, systolic blood pressure >99 mm Hg, and respiratory rate <20 breaths per minute).      Pantoprazole 40 mg IV once daily will be changed to pantoprazole 40 mg PO once  daily.    Pharmacist's Name: Miguel Hernandez  Pharmacist's Extension: [phone]

## 2023-11-07 NOTE — PLAN OF CARE
[facility] - Telemetry (Hospital)  Discharge Final Note    Primary Care Provider: Eliot Ambriz MD    Expected Discharge Date: 11/7/2023    Final Discharge Note (most recent)       Final Note - 11/07/23 1118          Final Note    Assessment Type Final Discharge Note     Anticipated Discharge Disposition [place] [facility]     Hospital Resources/Appts/Education Provided Post-Acute resouces added to AVS;Appointments scheduled and added to AVS        Post-Acute Status    Post-Acute Authorization Home Health     Home Health Status Set-up Complete/Auth obtained     Discharge Delays None known at this time                   Pt to discharge home today. Referral sent to [facility] for home health assignment. AVS updated.   Post-op with Dr Cazares scheduled on AVS.    No CM needs for discharge.    Important Message from Medicare  Important Message from Medicare regarding Discharge Appeal Rights: Given to patient/caregiver, Explained to patient/caregiver, Signed/date by patient/caregiver     Date IMM was signed: 11/06/23  Time IMM was signed: 1416    Contact Info       Oscar Cazares MD   Specialty: Cardiothoracic Surgery    [address] [place] [address] [address] [place] [address]   Phone: [phone]       Next Steps: Follow up on 11/22/2023    Baldev Mathew MD   Specialty: Interventional Cardiology, Cardiology    [address]  [place] [address]   Phone: [phone]       Next Steps: Follow up in 2 week(s)    Eliot Ambriz MD   Specialty: Family Medicine   Relationship: PCP - General    [facility]  [address]  [address]  [place] [address]   Phone: [phone]       Next Steps: Follow up in 1 week(s)    [facility]   Specialty: DME Provider, Home Health Services    [address]  [address]  [place] [address]   Phone: [phone]       Next Steps: Follow up    Instructions: PENDING HOME HEALTH  ASSIGNMENT

## 2023-11-07 NOTE — PROGRESS NOTES
[facility] - Telemetry ([facility])  Cardiology  Progress Note    Patient Name: Carlin Isaac  MRN: [id number]  Admission Date: 10/28/2023  Hospital Length of Stay: 10 days  Code Status: Full Code   Attending Physician: Oscar Cazares MD   Primary Care Physician: Eliot Ambriz MD  Expected Discharge Date: 11/7/2023  Principal Problem:Cardiac arrest    Subjective:   HPI:  History of Present Illness: Carlin Isaac is a 68 y.o. male patient who presents to the Emergency Department for evaluation of cardiac arrest onset PTA. Pt was working out at a [employer] when he suddenly collapsed. EMS administered 3 shocks, 3 epis, and amio while on route. Symptoms are constant and moderate in severity. No mitigating or exacerbating factors reported. Associated sxs not reported. Other prior Tx not reported. No further complaints or concerns at this time.      Per significant other, Coronary artery disease involving native coronary artery of native heart without angina pectoris  -s/p remote PCI/stent of the proximal/mid LAD 2007  -[facility] 09/2022 with distal left main, ostial LAD, and proximal LCx disease w/left dominate circulation, has preserved LVEF-CT surgery consulted and recommended elective CABG. Medical therapy optimized, and anginal symptoms did not reoccur therefore patient deferred CABG-will obtain if has further issues with angina  -will continue on current GDMT with aspirin, statin, BB, and long-acting nitrate      H/O bilateral carotid disease of 30% and HTN.    Hospital Course:   10/29/2023: The patient is supported intubated stable.  Cardiac echo shows normal LV function troponin levels are elevated EKG shows ST segments have reverted back to normal sinus rhythm with normal intervals and no evidence of further ST changes.  Putting the wife the patient has significant coronary disease and had refused bypass surgery last year.  Will plan another heart catheterization after he is wakeful and extubated.   Clinically stable this time with supportive care.    10/30/23   Intubated, NSR, some nonsustained tachyrhythmia overnight.Pt on Levo, Amio,.     10/31/23 CP last night requiring tridil, troponin > 50, no CP on exam. Discussed cath with pt and agrees. Will schedule with Dr. Mathew    11/1/23   No CP, CABG tomorrow, impella in place, no CP    11/2/23  s/p 3V CABG, on post op vasopressors, tele NSR    11/3/23   POD #2, extubated, impella removed, tele monitor A paicng, epi weaned off    11/6/23-Patient seen and examined today, sitting up in bedside chair. Recovering well post-CABG. Ambulating with PT/OT. Pain controlled.     11/7/23-Patient seen and examined today, resting in bed. No AEON. Feels well. Labs reviewed. CTS to d/c home today.          Review of Systems   Constitutional: Positive for malaise/fatigue.   HENT: Negative.     Eyes: Negative.    Cardiovascular:  Positive for chest pain (incisional).   Respiratory: Negative.     Endocrine: Negative.    Hematologic/Lymphatic: Negative.    Skin: Negative.    Musculoskeletal: Negative.    Gastrointestinal: Negative.    Genitourinary: Negative.    Neurological: Negative.    Psychiatric/Behavioral: Negative.     Allergic/Immunologic: Negative.      Objective:     Vital Signs (Most Recent):  Temp: 98.9 °F (37.2 °C) (11/07/23 1130)  Pulse: 80 (11/07/23 1130)  Resp: 18 (11/07/23 1130)  BP: (!) 120/58 (11/07/23 1130)  SpO2: (!) 90 % (11/07/23 1130) Vital Signs (24h Range):  Temp:  [97.4 °F (36.3 °C)-98.9 °F (37.2 °C)] 98.9 °F (37.2 °C)  Pulse:  [62-81] 80  Resp:  [17-19] 18  SpO2:  [90 %-100 %] 90 %  BP: (100-164)/(53-76) 120/58     Weight: 99.1 kg (218 lb 7.6 oz)  Body mass index is 27.31 kg/m².     SpO2: (!) 90 %         Intake/Output Summary (Last 24 hours) at 11/7/2023 1217  Last data filed at 11/7/2023 0320  Gross per 24 hour   Intake 240 ml   Output 500 ml   Net -260 ml       Lines/Drains/Airways       Peripheral Intravenous Line  Duration                  Peripheral  "IV - Single Lumen 11/04/23 1501 20 G Anterior;Left Upper Arm 2 days         Peripheral IV - Single Lumen 11/04/23 1501 20 G Left Antecubital 2 days                       Physical Exam  Vitals and nursing note reviewed.   Constitutional:       General: He is not in acute distress.     Appearance: Normal appearance. He is well-developed. He is not diaphoretic.   HENT:      Head: Normocephalic and atraumatic.   Eyes:      General:         Right eye: No discharge.         Left eye: No discharge.      Pupils: Pupils are equal, round, and reactive to light.   Cardiovascular:      Rate and Rhythm: Normal rate and regular rhythm.      Heart sounds: Normal heart sounds, S1 normal and S2 normal. No murmur heard.     No friction rub.      Comments: Sternotomy site healing well C/D/I  Pulmonary:      Effort: Pulmonary effort is normal.      Breath sounds: Normal breath sounds.   Abdominal:      General: There is no distension.      Tenderness: There is no rebound.   Musculoskeletal:      Right lower leg: No edema.      Left lower leg: No edema.   Skin:     General: Skin is warm and dry.      Findings: No erythema.      Comments: SVG sites C/D/I   Neurological:      General: No focal deficit present.      Mental Status: He is alert and oriented to person, place, and time.   Psychiatric:         Mood and Affect: Mood normal.         Behavior: Behavior normal.         Thought Content: Thought content normal.            Significant Labs: CMP   Recent Labs   Lab 11/07/23  0507      K 4.2      CO2 28      BUN 24*   CREATININE 1.1   CALCIUM 8.3*   ANIONGAP 10   , CBC   Recent Labs   Lab 11/07/23  0507   WBC 11.53   HGB 8.0*   HCT 25.8*      , Troponin No results for input(s): "TROPONINI" in the last 48 hours., and All pertinent lab results from the last 24 hours have been reviewed.    Significant Imaging: Echocardiogram: Transthoracic echo (TTE) complete (Cupid Only):   Results for orders placed or performed " during the hospital encounter of 10/28/23   Echo Saline Bubble? No   Result Value Ref Range    BSA 2.3 m2    RV mid diameter 3.43 cm    Est. RA pres 3 mmHg    Narrative      Left Ventricle: The left ventricle is normal in size. Normal wall   thickness. Normal wall motion. There is normal systolic function with a   visually estimated ejection fraction of 60 - 65%. There is normal   diastolic function.    Right Ventricle: Normal right ventricular cavity size. Wall thickness   is normal. Right ventricle wall motion  is normal. Systolic function is   normal.    IVC/SVC: Normal venous pressure at 3 mmHg.     , EKG: Reviewed, and X-Ray: CXR: X-Ray Chest 1 View (CXR): No results found for this visit on 10/28/23. and X-Ray Chest PA and Lateral (CXR): No results found for this visit on 10/28/23.    Assessment and Plan:   Patient who presents with CAD/cardiac arrest. Recovering well post-CABG. Patient to f/u with his primary cardiologist upon d/c.    * Cardiac arrest  Will continue supportive care  Continue amiodarone  Continue enoxaparin  Add BB if needed    11/6/23  -Recovered, now s/p CABG  -Continue amio, BB    S/P CABG x 3  -Continue current post-op care and mgmt as per CTS  -Continue ASA, Plavix, BB, statin, ACEi  -PT/OT    Hyperlipidemia  -Statin    Left main coronary artery disease  Significant CAD, EKG shows anterior and lateral ischemia  Plan nitrates if possible  1 st troponin negative and will trend  Likely cardiac event was VT  Cardiac echo shows  Preserved LV function    11/6/23  -Recovering well s/p CABG    11/7/23  -Stable post CABG  -Continue ASA, Plavix, statin, BB, ACEi        VTE Risk Mitigation (From admission, onward)         Ordered     IP VTE LOW RISK PATIENT  Once         11/01/23 1945     Place JUAN PABLO hose  Until discontinued         11/01/23 1945     heparin 25,000 units in dextrose 5% (100 units/ml) IV bolus from bag - ADDITIONAL PRN BOLUS - 30 units/kg (max bolus 4000 units)  As needed (PRN)         Question:  Heparin Infusion Adjustment (DO NOT MODIFY ANSWER)  Answer:  [email or web address]\epic\Images\Pharmacy\HeparinInfusions\heparin LOW INTENSITY nomogram for [facility] [id number]    10/31/23 0001     heparin 25,000 units in dextrose 5% (100 units/ml) IV bolus from bag - ADDITIONAL PRN BOLUS - 60 units/kg (max bolus 4000 units)  As needed (PRN)        Question:  Heparin Infusion Adjustment (DO NOT MODIFY ANSWER)  Answer:  \\[email or web address]\epic\Images\Pharmacy\HeparinInfusions\heparin LOW INTENSITY nomogram for [facility] [id number].pdf    10/30/23 2212     heparin 25,000 units in dextrose 5% 250 mL (100 units/mL) infusion LOW INTENSITY nomogram - [facility]  Continuous        Question:  Begin at (units/kg/hr)  Answer:  12    10/30/23 2212     Place sequential compression device  Until discontinued         10/28/23 122                Kym Noel PA-C  Cardiology  [facility] - Telemetry ([facility])

## 2023-11-07 NOTE — SUBJECTIVE & OBJECTIVE
Review of Systems   Constitutional: Positive for malaise/fatigue.   HENT: Negative.     Eyes: Negative.    Cardiovascular:  Positive for chest pain (incisional).   Respiratory: Negative.     Endocrine: Negative.    Hematologic/Lymphatic: Negative.    Skin: Negative.    Musculoskeletal: Negative.    Gastrointestinal: Negative.    Genitourinary: Negative.    Neurological: Negative.    Psychiatric/Behavioral: Negative.     Allergic/Immunologic: Negative.      Objective:     Vital Signs (Most Recent):  Temp: 98.9 °F (37.2 °C) (11/07/23 1130)  Pulse: 80 (11/07/23 1130)  Resp: 18 (11/07/23 1130)  BP: (!) 120/58 (11/07/23 1130)  SpO2: (!) 90 % (11/07/23 1130) Vital Signs (24h Range):  Temp:  [97.4 °F (36.3 °C)-98.9 °F (37.2 °C)] 98.9 °F (37.2 °C)  Pulse:  [62-81] 80  Resp:  [17-19] 18  SpO2:  [90 %-100 %] 90 %  BP: (100-164)/(53-76) 120/58     Weight: 99.1 kg (218 lb 7.6 oz)  Body mass index is 27.31 kg/m².     SpO2: (!) 90 %         Intake/Output Summary (Last 24 hours) at 11/7/2023 1217  Last data filed at 11/7/2023 0320  Gross per 24 hour   Intake 240 ml   Output 500 ml   Net -260 ml       Lines/Drains/Airways       Peripheral Intravenous Line  Duration                  Peripheral IV - Single Lumen 11/04/23 1501 20 G Anterior;Left Upper Arm 2 days         Peripheral IV - Single Lumen 11/04/23 1501 20 G Left Antecubital 2 days                       Physical Exam  Vitals and nursing note reviewed.   Constitutional:       General: He is not in acute distress.     Appearance: Normal appearance. He is well-developed. He is not diaphoretic.   HENT:      Head: Normocephalic and atraumatic.   Eyes:      General:         Right eye: No discharge.         Left eye: No discharge.      Pupils: Pupils are equal, round, and reactive to light.   Cardiovascular:      Rate and Rhythm: Normal rate and regular rhythm.      Heart sounds: Normal heart sounds, S1 normal and S2 normal. No murmur heard.     No friction rub.      Comments:  "Sternotomy site healing well C/D/I  Pulmonary:      Effort: Pulmonary effort is normal.      Breath sounds: Normal breath sounds.   Abdominal:      General: There is no distension.      Tenderness: There is no rebound.   Musculoskeletal:      Right lower leg: No edema.      Left lower leg: No edema.   Skin:     General: Skin is warm and dry.      Findings: No erythema.      Comments: SVG sites C/D/I   Neurological:      General: No focal deficit present.      Mental Status: He is alert and oriented to person, place, and time.   Psychiatric:         Mood and Affect: Mood normal.         Behavior: Behavior normal.         Thought Content: Thought content normal.            Significant Labs: CMP   Recent Labs   Lab 11/07/23  0507      K 4.2      CO2 28      BUN 24*   CREATININE 1.1   CALCIUM 8.3*   ANIONGAP 10   , CBC   Recent Labs   Lab 11/07/23  0507   WBC 11.53   HGB 8.0*   HCT 25.8*      , Troponin No results for input(s): "TROPONINI" in the last 48 hours., and All pertinent lab results from the last 24 hours have been reviewed.    Significant Imaging: Echocardiogram: Transthoracic echo (TTE) complete (Cupid Only):   Results for orders placed or performed during the hospital encounter of 10/28/23   Echo Saline Bubble? No   Result Value Ref Range    BSA 2.3 m2    RV mid diameter 3.43 cm    Est. RA pres 3 mmHg    Narrative      Left Ventricle: The left ventricle is normal in size. Normal wall   thickness. Normal wall motion. There is normal systolic function with a   visually estimated ejection fraction of 60 - 65%. There is normal   diastolic function.    Right Ventricle: Normal right ventricular cavity size. Wall thickness   is normal. Right ventricle wall motion  is normal. Systolic function is   normal.    IVC/SVC: Normal venous pressure at 3 mmHg.     , EKG: Reviewed, and X-Ray: CXR: X-Ray Chest 1 View (CXR): No results found for this visit on 10/28/23. and X-Ray Chest PA and Lateral " (CXR): No results found for this visit on 10/28/23.

## 2023-11-07 NOTE — PLAN OF CARE
Patient remained free from falls throughout shift, call bell within reach. POD 5 CABG. Pacer wirers and wound vac removed yesterday. Prn mag given for 2.3. tramadol given for incisional pain. Vitals stable. Most likely d/c today.

## 2023-11-07 NOTE — PLAN OF CARE
Pt tolerated session well this date. Pt found standing at in room sink ready to complete oral hygiene. Pt ambulated 420 ft with Mod I with no AD. Pt completed a stand>sit transfer with Mod I. Pt's bed mobility and EOB>supine with HOB elevated with Mod I with poor adherence to sternal precautions.    ADLs: Pt completed oral hygiene while standing at in room sink with Mod I.    D/C rec: Low Intensity Therapy

## 2023-11-07 NOTE — ASSESSMENT & PLAN NOTE
11/3/2023  The patient is postop day 1 status post urgent coronary artery bypass grafting x3.  Overall, the patient is doing well.    Neuro:  Patient is awake alert and oriented x3.  Neuro exam is nonfocal.  Patient is requiring p.o. narcotic medication supplemented with p.r.n. IV for pain control.  Patient's source of pain is is chronic back pain.  Patient has minimal incisional pain complaints.    Cardiac:  Patient has been hemodynamically stable.  Cardiac output has been in the excess of 5.5 L per minute.  Epi has been weaned to off.  Impella left ventricular assist device has been removed.  Respiratory:  Patient was extubated yesterday.  Patient having good sats on nasal cannula.  Continue pulmonary toileting.  Continue incentive spirometer.  GI:  Will advance patient's as tolerated.  LFTs that were elevated preoperatively continue to trend downward.  Renal:  Patient has good urine output.  Creatinine is 1.1.  Will start diuresis with Lasix.  Endocrine:  Glucose is controlled with an insulin drip.  Will convert to sliding scale.  Id:  Patient had a T-max of 101.7°.  White count is 14.  Suspect due to stress of surgery.  Will continue to follow.    Heme:  Hematocrit is 26.9 and platelet count is 132.  Will start aspirin and Plavix.  Activities:  Patient's is on bedrest for 3 hours status post Impella removal.  Will advance activities as tolerated.  Line tubes and drains:  Patient has a right IJ [place] and Cordis, Varela catheter, chest tubes, saphenectomy site JOHN drain, A-line and pacer wires    11/04/2023   The patient is postop day 2 status post urgent coronary artery bypass grafting x3.  Overall the patient is doing well.    Neuro:  Patient is awake alert and oriented x3.  Neuro exam is nonfocal.  Patient's pain is better controlled.    Cardiac:  Patient is off all drips.  Blood pressure has been stable.  Continue metoprolol and amiodarone.    Respiratory:  Patient has good sats on nasal cannula.  Continue  pulmonary toileting.  Continue incentive spirometer.    GI:  Patient is tolerating a regular diet.  Advance as tolerated.  Renal:  Patient has good urine output.  Creatinine is stable.  Continue diuresis for now.    Endocrine: Glucose is controlled with a sliding scale.    Id:  Patient is afebrile and white count is 10.  Heme:  Hematocrit is 22 and platelet count is 95.  Patient is asymptomatic.  Continue aspirin and Plavix.    Activities:  Patient is out of bed and has ambulated.  Continue to advance as tolerated.  Line tubes and drains:  Patient has a right IJ [place] and Cordis which will be discontinued.  Varela catheter, A-line and pacer wire.    11/05/2023   The patient is postop day 3 status post urgent coronary artery bypass grafting x3.  Overall the patient is doing well.  Anticipate discharge home with home health in the next 48 hours.    Neuro:  Patient is awake alert and oriented x3.  Neuro exam is nonfocal.  Pain is much better controlled.  Will deescalate pain control to tramadol.    Cardiac:  Patient blood pressure has been stable continue metoprolol.  Respiratory:  Patient has good sats on room air.  Continue pulmonary toileting.  Continue incentive spirometer.    GI:  Patient is tolerating a regular diet.    Renal:  Patient has good urine output.  Creatinine is stable at 1.0.  Magnesium is 2.0 and being replaced replaced.  Patient appears euvolemic.  Will discontinue Lasix.  Endocrine:  Glucose is well controlled.    Id:  Patient is afebrile and white count is 11.    Heme:  Hematocrit is 23 and platelet count is 157.  Continue aspirin and Plavix.    Activities: Patient is out of bed and ambulating.  Continue to advance as tolerated.  Line tubes and drains patient has pacer wires.    11/06/2023   The patient is postop day 4 status post coronary artery bypass grafting x3.  Overall the patient is doing well.  Anticipate discharge to home with home health in the next 24 hours.    Neuro:  Patient is awake  alert and oriented x3.  Neuro exam is nonfocal.  Pain is well controlled.    Cardiac:  Patient is has a systolic blood pressure of 150.  Will add lisinopril.  Respiratory:  Patient has good sats on room air.  Continue pulmonary toileting.    GI:  Patient is tolerating a regular diet and has had bowel movements.  Renal:  Patient has good urine output.  Patient has bilateral pedal edema.  Will start p.o. Lasix.    Endocrine: Glucose is well controlled.  Id:  Patient is afebrile.    Heme:  Patient is on aspirin and Plavix.  Will add low-dose 2.5 mg b.i.d. of rivaroxaban for anti thrombin effect since he is status post an acute coronary syndrome.  Activities:  Patient is out of bed and ambulating.  Continue to advance as tolerated.  Line tubes and drains:  Patient has peripheral lines.    11/07/2023   The patient is postop day 5 status post coronary artery bypass grafting x3.  Overall the patient is doing well.  Patient will be discharged home with home health today.  Neuro:  Patient is awake alert and oriented x3.  Neuro exam is nonfocal.  Pain is well controlled.  Prior to surgery, patient was being managed at a pain clinic.  Patient will follow-up with his regular pain clinic doctor for further pain management.  Cardiac:  Patient is hemodynamically stable.  Continue metoprolol and lisinopril.  Continue amiodarone.    Respiratory:  Patient has good sats on room air.  Continue pulmonary toileting.    GI:  Patient is tolerating a regular diet and is having bowel movements.    Renal:  Patient has good urine output.  Creatinine is stable.  Edema is improved.  Will continue p.o. Lasix for 7 more days.  Endocrine: Glucose is controlled.    Id:  Patient is afebrile and white count is 11.  Heme:  Hematocrit is 25.  Continue aspirin Plavix.  Continue low-dose 2.5 mg b.i.d. rivaroxaban for anti thrombin effect.  The risks and benefits of adding rivaroxaban 2 double antiplatelet therapy was discussed with the patient.  The  patient understands the risks of bleeding.  He understands that he will seek medical attention in the event of abnormal bleeding.  Activities:  Patient is out of bed and ambulating.  Advance as tolerated.    Line tubes and drains:  Patient has peripheral lines.

## 2023-11-07 NOTE — ASSESSMENT & PLAN NOTE
11/3/2023  The patient is postop day 1 status post urgent coronary artery bypass grafting x3.  Overall, the patient is doing well.    Neuro:  Patient is awake alert and oriented x3.  Neuro exam is nonfocal.  Patient is requiring p.o. narcotic medication supplemented with p.r.n. IV for pain control.  Patient's source of pain is is chronic back pain.  Patient has minimal incisional pain complaints.    Cardiac:  Patient has been hemodynamically stable.  Cardiac output has been in the excess of 5.5 L per minute.  Epi has been weaned to off.  Impella left ventricular assist device has been removed.  Respiratory:  Patient was extubated yesterday.  Patient having good sats on nasal cannula.  Continue pulmonary toileting.  Continue incentive spirometer.  GI:  Will advance patient's as tolerated.  LFTs that were elevated preoperatively continue to trend downward.  Renal:  Patient has good urine output.  Creatinine is 1.1.  Will start diuresis with Lasix.  Endocrine:  Glucose is controlled with an insulin drip.  Will convert to sliding scale.  Id:  Patient had a T-max of 101.7°.  White count is 14.  Suspect due to stress of surgery.  Will continue to follow.    Heme:  Hematocrit is 26.9 and platelet count is 132.  Will start aspirin and Plavix.  Activities:  Patient's is on bedrest for 3 hours status post Impella removal.  Will advance activities as tolerated.  Line tubes and drains:  Patient has a right IJ [place] and Cordis, Varela catheter, chest tubes, saphenectomy site JOHN drain, A-line and pacer wires    11/04/2023   The patient is postop day 2 status post urgent coronary artery bypass grafting x3.  Overall the patient is doing well.    Neuro:  Patient is awake alert and oriented x3.  Neuro exam is nonfocal.  Patient's pain is better controlled.    Cardiac:  Patient is off all drips.  Blood pressure has been stable.  Continue metoprolol and amiodarone.    Respiratory:  Patient has good sats on nasal cannula.  Continue  pulmonary toileting.  Continue incentive spirometer.    GI:  Patient is tolerating a regular diet.  Advance as tolerated.  Renal:  Patient has good urine output.  Creatinine is stable.  Continue diuresis for now.    Endocrine: Glucose is controlled with a sliding scale.    Id:  Patient is afebrile and white count is 10.  Heme:  Hematocrit is 22 and platelet count is 95.  Patient is asymptomatic.  Continue aspirin and Plavix.    Activities:  Patient is out of bed and has ambulated.  Continue to advance as tolerated.  Line tubes and drains:  Patient has a right IJ [place] and Cordis which will be discontinued.  Varela catheter, A-line and pacer wire.    11/05/2023   The patient is postop day 3 status post urgent coronary artery bypass grafting x3.  Overall the patient is doing well.  Anticipate discharge home with home health in the next 48 hours.    Neuro:  Patient is awake alert and oriented x3.  Neuro exam is nonfocal.  Pain is much better controlled.  Will deescalate pain control to tramadol.    Cardiac:  Patient blood pressure has been stable continue metoprolol.  Respiratory:  Patient has good sats on room air.  Continue pulmonary toileting.  Continue incentive spirometer.    GI:  Patient is tolerating a regular diet.    Renal:  Patient has good urine output.  Creatinine is stable at 1.0.  Magnesium is 2.0 and being replaced replaced.  Patient appears euvolemic.  Will discontinue Lasix.  Endocrine:  Glucose is well controlled.    Id:  Patient is afebrile and white count is 11.    Heme:  Hematocrit is 23 and platelet count is 157.  Continue aspirin and Plavix.    Activities: Patient is out of bed and ambulating.  Continue to advance as tolerated.  Line tubes and drains patient has pacer wires.    11/06/2023   The patient is postop day 4 status post coronary artery bypass grafting x3.  Overall the patient is doing well.  Anticipate discharge to home with home health in the next 24 hours.    Neuro:  Patient is awake  alert and oriented x3.  Neuro exam is nonfocal.  Pain is well controlled.    Cardiac:  Patient is has a systolic blood pressure of 150.  Will add lisinopril.  Respiratory:  Patient has good sats on room air.  Continue pulmonary toileting.    GI:  Patient is tolerating a regular diet and has had bowel movements.  Renal:  Patient has good urine output.  Patient has bilateral pedal edema.  Will start p.o. Lasix.    Endocrine: Glucose is well controlled.  Id:  Patient is afebrile.    Heme:  Patient is on aspirin and Plavix.  Will add low-dose 2.5 mg b.i.d. of rivaroxaban for anti thrombin effect since he is status post an acute coronary syndrome.  Activities:  Patient is out of bed and ambulating.  Continue to advance as tolerated.  Line tubes and drains:  Patient has peripheral lines.    11/07/2023   The patient is postop day 5 status post coronary artery bypass grafting x3.  Overall the patient is doing well.  Patient will be discharged home with home health today.  Neuro:  Patient is awake alert and oriented x3.  Neuro exam is nonfocal.  Pain is well controlled.  Prior to surgery, patient was being managed at a pain clinic.  Patient will follow-up with his regular pain clinic doctor for further pain management.  Cardiac:  Patient is hemodynamically stable.  Continue metoprolol and lisinopril.  Continue amiodarone.    Respiratory:  Patient has good sats on room air.  Continue pulmonary toileting.    GI:  Patient is tolerating a regular diet and is having bowel movements.    Renal:  Patient has good urine output.  Creatinine is stable.  Edema is improved.  Will continue p.o. Lasix for 7 more days.  Endocrine: Glucose is controlled.    Id:  Patient is afebrile and white count is 11.  Heme:  Hematocrit is 25.  Continue aspirin Plavix.  Continue low-dose 2.5 mg b.i.d. rivaroxaban for anti thrombin effect.  The risks and benefits of adding rivaroxaban 2 double antiplatelet therapy was discussed with the patient.  The  patient understands the risks of bleeding.  He understands that he will seek medical attention in the event of abnormal bleeding.  Activities:  Patient is out of bed and ambulating.  Advance as tolerated.    Line tubes and drains:  Patient has peripheral lines.

## 2023-11-07 NOTE — SUBJECTIVE & OBJECTIVE
Interval History: The patient is postop day 5 status post urgent coronary artery bypass grafting x3.    ROS  Medications:  Continuous Infusions:  Scheduled Meds:   amiodarone  200 mg Oral BID    ascorbic acid (vitamin C)  500 mg Oral BID    aspirin  81 mg Oral Daily    atorvastatin  80 mg Oral Daily    chlorhexidine  10 mL Mouth/Throat BID    clopidogreL  75 mg Oral Daily    cyanocobalamin  1,000 mcg Oral Daily    docusate sodium  100 mg Oral BID    ferrous sulfate  1 tablet Oral Daily    folic acid  1 mg Oral Daily    furosemide  40 mg Oral BID    lisinopriL  5 mg Oral Daily    magnesium hydroxide 400 mg/5 ml  5 mL Oral BID    metoprolol tartrate  25 mg Oral BID    pantoprazole  40 mg Oral Daily    polyethylene glycol  17 g Oral Daily    potassium chloride  10 mEq Oral BID    rivaroxaban  2.5 mg Oral BID WM     PRN Meds:sodium chloride 0.9%, acetaminophen, albumin human 5%, calcium gluconate IVPB, calcium gluconate IVPB, calcium gluconate IVPB, hydrALAZINE, influenza 65up-adj, lactated ringers, magnesium sulfate IVPB, magnesium sulfate IVPB, magnesium sulfate IVPB, metoclopramide HCl, ondansetron, potassium chloride in water, potassium chloride in water, potassium chloride in water, sodium chloride 0.9%, sodium chloride 0.9%, sodium phosphate 15 mmol in dextrose 5 % (D5W) 250 mL IVPB, sodium phosphate 20.01 mmol in dextrose 5 % (D5W) 250 mL IVPB, sodium phosphate 30 mmol in dextrose 5 % (D5W) 250 mL IVPB, traMADoL     Objective:     Vital Signs (Most Recent):  Temp: 97.4 °F (36.3 °C) (11/07/23 0720)  Pulse: 73 (11/07/23 0800)  Resp: 17 (11/07/23 0603)  BP: 127/68 (11/07/23 0720)  SpO2: 98 % (11/07/23 0720) Vital Signs (24h Range):  Temp:  [97.4 °F (36.3 °C)-98.7 °F (37.1 °C)] 97.4 °F (36.3 °C)  Pulse:  [62-81] 73  Resp:  [17-19] 17  SpO2:  [94 %-100 %] 98 %  BP: (100-164)/(53-76) 127/68     Weight: 99.1 kg (218 lb 7.6 oz)  Body mass index is 27.31 kg/m².    SpO2: 98 %       Intake/Output - Last 3 Shifts          11/05 0700 11/06 0659 11/06 0700 11/07 0659 11/07 0700 11/08 0659    P.O. 320 240     I.V. (mL/kg) 35.5 (0.4) 102.3 (1)     Total Intake(mL/kg) 355.5 (3.6) 342.3 (3.5)     Urine (mL/kg/hr) 300 (0.1) 500 (0.2)     Stool 0      Total Output 300 500     Net +55.5 -157.8            Stool Occurrence 2 x              Lines/Drains/Airways       Peripheral Intravenous Line  Duration                  Peripheral IV - Single Lumen 11/04/23 1501 20 G Anterior;Left Upper Arm 2 days         Peripheral IV - Single Lumen 11/04/23 1501 20 G Left Antecubital 2 days                     Physical Exam  Constitutional:       Appearance: Normal appearance.   HENT:      Head: Normocephalic and atraumatic.   Cardiovascular:      Rate and Rhythm: Normal rate and regular rhythm.      Heart sounds: Normal heart sounds.   Pulmonary:      Effort: Pulmonary effort is normal.      Breath sounds: Normal breath sounds.   Abdominal:      General: Abdomen is flat. Bowel sounds are normal.      Palpations: Abdomen is soft.   Musculoskeletal:      Right lower leg: Edema present.      Left lower leg: Edema present.   Skin:     General: Skin is warm and dry.   Neurological:      General: No focal deficit present.      Mental Status: He is alert and oriented to person, place, and time.   Psychiatric:         Mood and Affect: Mood normal.         Behavior: Behavior normal.            Significant Labs:  All pertinent labs from the last 24 hours have been reviewed.    Significant Diagnostics:  I have reviewed all pertinent imaging results/findings within the past 24 hours.

## 2023-11-07 NOTE — PT/OT/SLP PROGRESS
"Occupational Therapy   Treatment    Name: Carlin Isaac  MRN: [id number]  Admitting Diagnosis:  Cardiac arrest  5 Days Post-Op    Recommendations:     Discharge Recommendations: Low Intensity Therapy  Discharge Equipment Recommendations:  shower chair  Barriers to discharge:  None    Assessment:     Carlin Isaac is a 68 y.o. male with a medical diagnosis of Cardiac arrest.  He presents with self care debility. Performance deficits affecting function are impaired cardiopulmonary response to activity, decreased ROM, decreased safety awareness, decreased upper extremity function, weakness, gait instability, impaired balance.     Rehab Prognosis:  Good; patient would benefit from acute skilled OT services to address these deficits and reach maximum level of function.       Plan:     Patient to be seen 2 x/week to address the above listed problems via self-care/home management, therapeutic activities, therapeutic exercises  Plan of Care Expires: 11/18/23  Plan of Care Reviewed with: patient    Subjective     Chief Complaint: None  Patient/Family Comments/goals: Pt stated, "I just walked 30 mins ago and have walked 3 times today"  Pain/Comfort:  Pain Rating 1: 0/10  Pain Rating Post-Intervention 1: 0/10    Objective:     Communicated with: pt's nurse, Susi, and completed a chart review via Epic prior to session.  Patient found standing at in room sink about to begin oral hygiene with telemetry, peripheral IV upon OT entry to room.    General Precautions: Standard, fall, sternal    Orthopedic Precautions:N/A  Braces: N/A  Respiratory Status: Room air     Occupational Performance:   Bed Mobility:    Patient completed Sit to HOB elevated with modified independence with poor adherence to sternal precautions.    Functional Mobility/Transfers:  Functional Mobility: Pt ambulated 420 ft with SBA with no AD for increase in activity tolerance for completion of  desired occupations.  Pt demo'd unsteady gait while " ambulating.  Patient completed Sit <> Stand Transfer with modified independence  with  no assistive device     Activities of Daily Living:  Grooming: Pt  completed oral hygiene with modified independence while standing at in room sink  for 5 minutes.        [facility] 6 Click ADL: 21    Treatment & Education:  Pt tolerated session well. Pt stated he had just completed walking 30 mins prior and required encouragement to engage in therapy this date. Pt re-educated on sternal precautions. Poor compliance observed this date. Pt educated on BUE ROM therex for promote and sustain ROM and strength for functional independence in desired occupations. Pt educated on call don't fall policy. Pt encouraged to complete therex throughout the day as tolerated.    Patient left HOB elevated with all lines intact and call button in reach    GOALS:   Multidisciplinary Problems       Occupational Therapy Goals          Problem: Occupational Therapy    Goal Priority Disciplines Outcome Interventions   Occupational Therapy Goal     OT, PT/OT Ongoing, Progressing    Description: Goals to be met by: 11/18/23     Patient will increase functional independence with ADLs by performing:    Toileting from toilet with Supervision for hygiene and clothing management.   Toilet transfer to toilet with Supervision.  Demonstrates 100% functional compliance with sternal precautions.                         Time Tracking:     OT Date of Treatment: 11/07/23  OT Start Time: 1000  OT Stop Time: 1025  OT Total Time (min): 25 min    Billable Minutes:Self Care/Home Management 8  Therapeutic Activity 17    OT/OFELIA: MONICA Cortes  11/7/2023   80yoF with h/o HTN, HLD, DM, CAD s/p CABG 2004, arthritis, presents with generalized full-body weakness x 1 week. Associated b/l LE swelling and blistering; swelling is chronic but worsened and now with weeping blisters bilaterally. On ROS reports also urinary hesitancy, unsure how long but at least 1 month    # RODRIGO: baseline cr. ~ 1.2 due to GIB/ Cardiorenal syndrome / ATN  # creatinine  worsening after dropping the BP today with new GIB  - hold diuretics, IVF to maintain MAP>65, BUN rising  - s/p CT Angiogram today - high risk for KALPANA, cont to hydrate gently, Lasix prn only  - strict Is and os  # no acute indication for RRT  # MIKHAIL , s /p venofer   #GIB - on PPI, s/p CTA, being tx'ed to CCU, GI f/u  #will follow

## 2023-11-07 NOTE — PT/OT/SLP PROGRESS
Physical Therapy Treatment    Patient Name:  Carlin Isaac   MRN:  [id number]    Recommendations:     Discharge Recommendations: Low Intensity Therapy  Discharge Equipment Recommendations: shower chair  Barriers to discharge: None    Assessment:     Carlin Isaac is a 68 y.o. male admitted with a medical diagnosis of Cardiac arrest.  He presents with the following impairments/functional limitations: weakness, impaired endurance, impaired functional mobility, gait instability, impaired balance, decreased safety awareness, decreased coordination, decreased lower extremity function, decreased upper extremity function.    Rehab Prognosis: Good; patient would benefit from acute skilled PT services to address these deficits and reach maximum level of function.    Recent Surgery: Procedure(s) (LRB):  CORONARY ARTERY BYPASS GRAFT (CABG) (N/A)  ECHOCARDIOGRAM,TRANSESOPHAGEAL (N/A)  SURGICAL PROCUREMENT, VEIN, ENDOSCOPIC (Left)  BLOCK, NERVE, INTERCOSTAL, 2 OR MORE (N/A) 5 Days Post-Op    Plan:     During this hospitalization, patient to be seen 3 x/week to address the identified rehab impairments via gait training, therapeutic activities, therapeutic exercises and progress toward the following goals:    Plan of Care Expires:  11/20/23    Subjective     Chief Complaint: NONE, READY TO GO HOME  Patient/Family Comments/goals:   Pain/Comfort:  Pain Rating 1: 0/10      Objective:     Communicated with NURSE GODINEZ prior to session.  Patient found ambulatory in room/carson with telemetry, peripheral IV upon PT entry to room.     General Precautions: Standard, fall, sternal  Orthopedic Precautions: N/A  Braces: N/A  Respiratory Status: Room air     Functional Mobility:  Bed Mobility:     Scooting: modified independence  Sit to Supine: modified independence  Transfers:     Sit to Stand:  modified independence with no AD  Gait: PT ' NO AD GREG, MILD LATERAL SWAY BUT NO GROSS LOB, CUES FOR UPRIGHT POSTURE, INTERMITTENT REACHING  "FOR RAIL IN HALLWAY  Balance: GOOD SITTING BALANCE, FAIR+ DYNAMIC BALANCE DURING GAIT  PT STOOD AT SINK GREG TO BRUSH TEETH    AM-PAC 6 CLICK MOBILITY  Turning over in bed (including adjusting bedclothes, sheets and blankets)?: 4  Sitting down on and standing up from a chair with arms (e.g., wheelchair, bedside commode, etc.): 4  Moving from lying on back to sitting on the side of the bed?: 4  Moving to and from a bed to a chair (including a wheelchair)?: 4  Need to walk in hospital room?: 4  Climbing 3-5 steps with a railing?: 1  Basic Mobility Total Score: 21     Treatment & Education:  PT EDUCATION:  - ROLE OF P.T. AND POC IN ACUTE CARE HOSPITAL SETTING  - REVIEW STERNAL PRECAUTIONS  - IMPORTANCE OF ACTIVITY PACING  - ENCOURAGED TO INCREASE TIME OOB IN CHAIR TO TOLERANCE   - TO CONTINUE THERAPUETIC EXERCISES THROUGHOUT THE DAY TO INCREASE ACTIVITY TOLERANCE AND DECREASE RISK FOR PNEUMONIA AND BLOOD CLOTS: HIP FLEX/EXT, HIP ABD/ADD, QUAD SET, HEEL SLIDE, AP  - RISK FOR FALLS DUE TO GENERALIZED WEAKNESS, EDUCATED ON "CALL DON'T FALL", ENCOURAGED TO CALL FOR ASSISTANCE WITH ALL NEEDS SUCH AS BED<>CHAIR TRANSFERS OR TRIPS TO BATHROOM, PT AGREEABLE TO SAFETY PRECAUTIONS    Patient left HOB elevated with all lines intact, call button in reach, and NURSE notified..    GOALS:   Multidisciplinary Problems       Physical Therapy Goals          Problem: Physical Therapy    Goal Priority Disciplines Outcome Goal Variances Interventions   Physical Therapy Goal     PT, PT/OT Ongoing, Progressing     Description: LTG'S TO BE MET IN 14 DAYS (11-20-23)  PT WILL BE INDEP FOR BED MOBILITY  PT WILL BE INDEP FOR BED<>CHAIR TF'S  PT WILL  FEET NO AD GREG  PT WILL INC AMPAC SCORE BY 2 POINTS TO PROGRESS GROSS FUNC MOBILITY                         Time Tracking:     PT Received On: 11/07/23  PT Start Time: 1005     PT Stop Time: 1030  PT Total Time (min): 25 min     Billable Minutes: Gait Training 10 and Therapeutic Activity " 15    Treatment Type: Treatment  PT/PTA: PT     Number of PTA visits since last PT visit: 0     11/07/2023

## 2023-11-07 NOTE — DISCHARGE SUMMARY
[facility])  Cardiothoracic Surgery  Discharge Summary      Patient Name: Carlin Isaac  MRN: [id number]  Admission Date: 10/28/2023  Hospital Length of Stay: 10 days  Discharge Date and Time:  11/07/2023 9:52 AM  Attending Physician: Oscar Cazares MD   Discharging Provider: Oscar Cazares MD  Primary Care Provider: Eliot Ambriz MD    HPI:   The patient is a 68-year-old male who was admitted to the hospital after out-of-hospital cardiac arrest.  The patient has hypertension, hypogonadism, Crohn's disease, coronary artery disease status post stent placement in 2007, cardiac catheterization in 2022 that shows left main disease, hyperlipidemia.  By report, the patient developed cardiac arrest while working out at [employer].  The patient was resuscitated with defibrillation, epinephrine and amiodarone prior to arriving in the hospital.  In the emergency room head CT was unremarkable.  Echocardiogram shows normal ejection fraction.  The patient was intubated and placed on a targeted temperature management.  Over the past 24 hours patient had recurrent episodes of chest pain with troponin elevation to 50.  Patient had cardiac catheterization done which showed severe multivessel coronary artery disease with distal left main stenosis and proximal RCA stenosis.  An Impella left ventricular assist device was then inserted in the cath lab.      Procedure(s) (LRB):  CORONARY ARTERY BYPASS GRAFT (CABG) (N/A)  ECHOCARDIOGRAM,TRANSESOPHAGEAL (N/A)  SURGICAL PROCUREMENT, VEIN, ENDOSCOPIC (Left)  BLOCK, NERVE, INTERCOSTAL, 2 OR MORE (N/A)      Indwelling Lines/Drains at time of discharge:   Lines/Drains/Airways     None               Hospital Course: 11/03/2023   The patient is postop day 1 status post urgent coronary artery bypass grafting x3.    11/04/2023   The patient is postop day 2 status post urgent coronary artery bypass grafting x3.    11/05/2023   The patient is postop day 3 status post  urgent coronary artery bypass grafting x3.    11/06/2023   The patient is postop day 4 status post coronary artery bypass grafting x3.    11/07/2023   The patient is postop day 5 status post urgent coronary artery bypass grafting x3.      Goals of Care Treatment Preferences:  Code Status: Full Code      Consults (From admission, onward)        Status Ordering Provider     Consult to Case Management/Social Work  Once        Provider:  (Not yet assigned)    Completed ADELA OLMOS.     Consult Case Management/Social Work  Once        Provider:  (Not yet assigned)    Completed ADELA OLMOS.     Inpatient consult to Registered Dietitian/Nutritionist  Once        Provider:  (Not yet assigned)    Completed ADELA OLMOS     Inpatient consult to Registered Dietitian/Nutritionist  Once        Provider:  (Not yet assigned)    Completed PUJA RAMAN     Inpatient consult to Cardiology  Once        Provider:  Héctor Graham MD    Completed ZEHRA CARLSON          Significant Diagnostic Studies: N/A    Pending Diagnostic Studies:     Procedure Component Value Units Date/Time    BNP [[id number]] Collected: 10/28/23 0914    Order Status: Sent Lab Status: In process Updated: 10/28/23 0915    Specimen: Blood     Echo [[id number]]     Order Status: Sent Lab Status: No result           Cardiac/Vascular  S/P CABG x 3  11/3/2023  The patient is postop day 1 status post urgent coronary artery bypass grafting x3.  Overall, the patient is doing well.    Neuro:  Patient is awake alert and oriented x3.  Neuro exam is nonfocal.  Patient is requiring p.o. narcotic medication supplemented with p.r.n. IV for pain control.  Patient's source of pain is is chronic back pain.  Patient has minimal incisional pain complaints.    Cardiac:  Patient has been hemodynamically stable.  Cardiac output has been in the excess of 5.5 L per minute.  Epi has been weaned to off.  Impella left ventricular assist device has been  removed.  Respiratory:  Patient was extubated yesterday.  Patient having good sats on nasal cannula.  Continue pulmonary toileting.  Continue incentive spirometer.  GI:  Will advance patient's as tolerated.  LFTs that were elevated preoperatively continue to trend downward.  Renal:  Patient has good urine output.  Creatinine is 1.1.  Will start diuresis with Lasix.  Endocrine:  Glucose is controlled with an insulin drip.  Will convert to sliding scale.  Id:  Patient had a T-max of 101.7°.  White count is 14.  Suspect due to stress of surgery.  Will continue to follow.    Heme:  Hematocrit is 26.9 and platelet count is 132.  Will start aspirin and Plavix.  Activities:  Patient's is on bedrest for 3 hours status post Impella removal.  Will advance activities as tolerated.  Line tubes and drains:  Patient has a right IJ [place], Varela catheter, chest tubes, saphenectomy site JOHN drain, A-line and pacer wires    11/04/2023   The patient is postop day 2 status post urgent coronary artery bypass grafting x3.  Overall the patient is doing well.    Neuro:  Patient is awake alert and oriented x3.  Neuro exam is nonfocal.  Patient's pain is better controlled.    Cardiac:  Patient is off all drips.  Blood pressure has been stable.  Continue metoprolol and amiodarone.    Respiratory:  Patient has good sats on nasal cannula.  Continue pulmonary toileting.  Continue incentive spirometer.    GI:  Patient is tolerating a regular diet.  Advance as tolerated.  Renal:  Patient has good urine output.  Creatinine is stable.  Continue diuresis for now.    Endocrine: Glucose is controlled with a sliding scale.    Id:  Patient is afebrile and white count is 10.  Heme:  Hematocrit is 22 and platelet count is 95.  Patient is asymptomatic.  Continue aspirin and Plavix.    Activities:  Patient is out of bed and has ambulated.  Continue to advance as tolerated.  Line tubes and drains:  Patient has a right IJ [place] which will be  discontinued.  Varela catheter, A-line and pacer wire.    11/05/2023   The patient is postop day 3 status post urgent coronary artery bypass grafting x3.  Overall the patient is doing well.  Anticipate discharge home with home health in the next 48 hours.    Neuro:  Patient is awake alert and oriented x3.  Neuro exam is nonfocal.  Pain is much better controlled.  Will deescalate pain control to tramadol.    Cardiac:  Patient blood pressure has been stable continue metoprolol.  Respiratory:  Patient has good sats on room air.  Continue pulmonary toileting.  Continue incentive spirometer.    GI:  Patient is tolerating a regular diet.    Renal:  Patient has good urine output.  Creatinine is stable at 1.0.  Magnesium is 2.0 and being replaced replaced.  Patient appears euvolemic.  Will discontinue Lasix.  Endocrine:  Glucose is well controlled.    Id:  Patient is afebrile and white count is 11.    Heme:  Hematocrit is 23 and platelet count is 157.  Continue aspirin and Plavix.    Activities: Patient is out of bed and ambulating.  Continue to advance as tolerated.  Line tubes and drains patient has pacer wires.    11/06/2023   The patient is postop day 4 status post coronary artery bypass grafting x3.  Overall the patient is doing well.  Anticipate discharge to home with home health in the next 24 hours.    Neuro:  Patient is awake alert and oriented x3.  Neuro exam is nonfocal.  Pain is well controlled.    Cardiac:  Patient is has a systolic blood pressure of 150.  Will add lisinopril.  Respiratory:  Patient has good sats on room air.  Continue pulmonary toileting.    GI:  Patient is tolerating a regular diet and has had bowel movements.  Renal:  Patient has good urine output.  Patient has bilateral pedal edema.  Will start p.o. Lasix.    Endocrine: Glucose is well controlled.  Id:  Patient is afebrile.    Heme:  Patient is on aspirin and Plavix.  Will add low-dose 2.5 mg b.i.d. of rivaroxaban for anti thrombin effect  since he is status post an acute coronary syndrome.  Activities:  Patient is out of bed and ambulating.  Continue to advance as tolerated.  Line tubes and drains:  Patient has peripheral lines.    11/07/2023   The patient is postop day 5 status post coronary artery bypass grafting x3.  Overall the patient is doing well.  Patient will be discharged home with home health today.  Neuro:  Patient is awake alert and oriented x3.  Neuro exam is nonfocal.  Pain is well controlled.  Prior to surgery, patient was being managed at a pain clinic.  Patient will follow-up with his regular pain clinic doctor for further pain management.  Cardiac:  Patient is hemodynamically stable.  Continue metoprolol and lisinopril.  Continue amiodarone.    Respiratory:  Patient has good sats on room air.  Continue pulmonary toileting.    GI:  Patient is tolerating a regular diet and is having bowel movements.    Renal:  Patient has good urine output.  Creatinine is stable.  Edema is improved.  Will continue p.o. Lasix for 7 more days.  Endocrine: Glucose is controlled.    Id:  Patient is afebrile and white count is 11.  Heme:  Hematocrit is 25.  Continue aspirin Plavix.  Continue low-dose 2.5 mg b.i.d. rivaroxaban for anti thrombin effect.  The risks and benefits of adding rivaroxaban 2 double antiplatelet therapy was discussed with the patient.  The patient understands the risks of bleeding.  He understands that he will seek medical attention in the event of abnormal bleeding.  Activities:  Patient is out of bed and ambulating.  Advance as tolerated.    Line tubes and drains:  Patient has peripheral lines.      Final Active Diagnoses:    Diagnosis Date Noted POA    PRINCIPAL PROBLEM:  Cardiac arrest [I46.9] 10/28/2023 Yes    S/P CABG x 3 [Z95.1] 11/04/2023 Not Applicable    Left main coronary artery disease [I25.10] 10/28/2023 Unknown    Primary hypertension [I10] 10/28/2023 Yes    Hyperlipidemia [E78.5] 10/28/2023 Yes    Hypogonadism  in male [E29.1] 10/28/2023 Yes    Ulcerative proctitis [K51.20] 10/28/2023 Yes      Problems Resolved During this Admission:    Diagnosis Date Noted Date Resolved POA    Non-traumatic rhabdomyolysis [M62.82] 10/29/2023 11/01/2023 Yes    KAREN (acute kidney injury) [N17.9] 10/28/2023 11/02/2023 Yes    Acute hypercapnic respiratory failure [J96.02] 10/28/2023 11/01/2023 Yes      Discharged Condition: good    Disposition: Home-Health Care Svc    Follow Up:    Patient Instructions:      Ambulatory referral/consult to Cardiac Rehab   Standing Status: Future   Referral Priority: Routine Referral Type: Consultation   Referral Reason: Specialty Services Required   Requested Specialty: Cardiac Rehabilitation   Number of Visits Requested: 1     Ambulatory referral/consult to Home Health   Standing Status: Future   Referral Priority: Routine Referral Type: Home Health Care   Referral Reason: Specialty Services Required   Requested Specialty: Home Health Services   Number of Visits Requested: 1     No driving until:     Notify your health care provider if you experience any of the following:  temperature >100.4     Notify your health care provider if you experience any of the following:  persistent nausea and vomiting or diarrhea     Notify your health care provider if you experience any of the following:  severe uncontrolled pain     Notify your health care provider if you experience any of the following:  redness, tenderness, or signs of infection (pain, swelling, redness, odor or green/yellow discharge around incision site)     Notify your health care provider if you experience any of the following:  difficulty breathing or increased cough     Notify your health care provider if you experience any of the following:  severe persistent headache     Notify your health care provider if you experience any of the following:  worsening rash     Notify your health care provider if you experience any of the following:  persistent  dizziness, light-headedness, or visual disturbances     Notify your health care provider if you experience any of the following:  increased confusion or weakness     Activity as tolerated     Weight bearing restrictions (specify):     Medications:  Reconciled Home Medications:      Medication List      START taking these medications    amiodarone 200 MG Tab  Commonly known as: PACERONE  Take 1 tablet (200 mg total) by mouth 2 (two) times daily.     ascorbic acid (vitamin C) 500 MG tablet  Commonly known as: VITAMIN C  Take 1 tablet (500 mg total) by mouth 2 (two) times daily.     aspirin 81 MG EC tablet  Commonly known as: ECOTRIN  Take 1 tablet (81 mg total) by mouth once daily.  Start taking on: November 8, 2023     atorvastatin 80 MG tablet  Commonly known as: LIPITOR  Take 1 tablet (80 mg total) by mouth once daily.  Start taking on: November 8, 2023     clopidogreL 75 mg tablet  Commonly known as: PLAVIX  Take 1 tablet (75 mg total) by mouth once daily.  Start taking on: November 8, 2023     cyanocobalamin 1000 MCG tablet  Commonly known as: VITAMIN B-12  Take 1 tablet (1,000 mcg total) by mouth once daily.  Start taking on: November 8, 2023     docusate sodium 100 MG capsule  Commonly known as: COLACE  Take 1 capsule (100 mg total) by mouth 2 (two) times daily.     ferrous sulfate 325 (65 FE) MG EC tablet  Take 1 tablet (325 mg total) by mouth once daily.     folic acid 1 MG tablet  Commonly known as: FOLVITE  Take 1 tablet (1 mg total) by mouth once daily.  Start taking on: November 8, 2023     furosemide 40 MG tablet  Commonly known as: LASIX  Take 1 tablet (40 mg total) by mouth 2 (two) times daily. for 7 days     magnesium hydroxide 400 mg/5 ml 400 mg/5 mL Susp  Commonly known as: MILK OF MAGNESIA  Take 5 mLs (400 mg total) by mouth 2 (two) times a day.     pantoprazole 40 MG tablet  Commonly known as: PROTONIX  Take 1 tablet (40 mg total) by mouth once daily.  Start taking on: November 8, 2023      polyethylene glycol 17 gram Pwpk  Commonly known as: GLYCOLAX  Take 17 g by mouth once daily.  Start taking on: November 8, 2023     potassium chloride SA 10 MEQ tablet  Commonly known as: K-DURKLOR-CON M  Take 1 tablet (10 mEq total) by mouth 2 (two) times daily. for 7 days     rivaroxaban 2.5 mg Tab  Commonly known as: XARELTO  Take 1 tablet (2.5 mg total) by mouth 2 (two) times daily with meals.        CHANGE how you take these medications    lisinopriL 5 MG tablet  Commonly known as: PRINIVIL,ZESTRIL  Take 1 tablet (5 mg total) by mouth once daily.  Start taking on: November 8, 2023  What changed:   · medication strength  · how much to take        CONTINUE taking these medications    ibuprofen 800 MG tablet  Commonly known as: ADVIL,MOTRIN  Take 800 mg by mouth 3 (three) times daily as needed.     metoprolol succinate 25 MG 24 hr tablet  Commonly known as: TOPROL-XL  Take 25 mg by mouth once daily.     oxyCODONE-acetaminophen  mg per tablet  Commonly known as: PERCOCET  Take 1 tablet by mouth every 6 (six) hours as needed.     testosterone cypionate 200 mg/mL injection  Commonly known as: DEPOTESTOTERONE CYPIONATE  Inject 200 mg into the muscle every 7 days.        STOP taking these medications    hydroCHLOROthiazide 25 MG tablet  Commonly known as: HYDRODIURIL     isosorbide mononitrate 30 MG 24 hr tablet  Commonly known as: IMDUR     rosuvastatin 20 MG tablet  Commonly known as: CRESTOR          Time spent on the discharge of patient: 45 minutes    Oscar Cazares MD  Cardiothoracic Surgery  [facility] - Telemetry ([facility])

## 2023-11-08 PROCEDURE — G0180 MD CERTIFICATION HHA PATIENT: HCPCS | Mod: ,,, | Performed by: THORACIC SURGERY (CARDIOTHORACIC VASCULAR SURGERY)

## 2023-11-10 NOTE — ANESTHESIA POSTPROCEDURE EVALUATION
Anesthesia Post Evaluation    Patient: Carlin Isaac    Procedure(s) Performed: Procedure(s) (LRB):  CORONARY ARTERY BYPASS GRAFT (CABG) (N/A)  ECHOCARDIOGRAM,TRANSESOPHAGEAL (N/A)  SURGICAL PROCUREMENT, VEIN, ENDOSCOPIC (Left)  BLOCK, NERVE, INTERCOSTAL, 2 OR MORE (N/A)    Final Anesthesia Type: general      Patient location during evaluation: ICU  Patient participation: No - Unable to Participate, Intubation  Level of consciousness: sedated  Post-procedure vital signs: reviewed and stable  Pain management: adequate  Airway patency: patent    PONV status at discharge: No PONV  Anesthetic complications: no      Cardiovascular status: stable and hemodynamically stable  Respiratory status: intubated  Hydration status: euvolemic  Follow-up not needed.          Vitals Value Taken Time   /58 11/07/23 1130   Temp 37.2 °C (98.9 °F) 11/07/23 1130   Pulse 80 11/07/23 1130   Resp 18 11/07/23 1130   SpO2 90 % 11/07/23 1130         No case tracking events are documented in the log.      Pain/Remy Score: No data recorded

## 2023-11-13 PROBLEM — I49.01 VENTRICULAR FIBRILLATION: Status: ACTIVE | Noted: 2023-11-13

## 2023-11-13 PROBLEM — I47.20 VT (VENTRICULAR TACHYCARDIA): Status: ACTIVE | Noted: 2023-11-13

## 2023-11-13 PROBLEM — I21.4 NSTEMI (NON-ST ELEVATED MYOCARDIAL INFARCTION): Status: ACTIVE | Noted: 2023-11-13

## 2023-11-22 ENCOUNTER — OFFICE VISIT (OUTPATIENT)
Dept: CARDIOTHORACIC SURGERY | Facility: CLINIC | Age: 68
End: 2023-11-22
Payer: MEDICARE

## 2023-11-22 VITALS
RESPIRATION RATE: 16 BRPM | OXYGEN SATURATION: 96 % | HEART RATE: 51 BPM | DIASTOLIC BLOOD PRESSURE: 66 MMHG | SYSTOLIC BLOOD PRESSURE: 125 MMHG | HEIGHT: 75 IN | BODY MASS INDEX: 25.19 KG/M2 | WEIGHT: 202.63 LBS

## 2023-11-22 DIAGNOSIS — Z95.1 S/P CABG X 3: Primary | ICD-10-CM

## 2023-11-22 PROCEDURE — 3044F PR MOST RECENT HEMOGLOBIN A1C LEVEL <7.0%: ICD-10-PCS | Mod: CPTII,S$GLB,, | Performed by: THORACIC SURGERY (CARDIOTHORACIC VASCULAR SURGERY)

## 2023-11-22 PROCEDURE — 99024 POSTOP FOLLOW-UP VISIT: CPT | Mod: S$GLB,,, | Performed by: THORACIC SURGERY (CARDIOTHORACIC VASCULAR SURGERY)

## 2023-11-22 PROCEDURE — 1157F PR ADVANCE CARE PLAN OR EQUIV PRESENT IN MEDICAL RECORD: ICD-10-PCS | Mod: CPTII,S$GLB,, | Performed by: THORACIC SURGERY (CARDIOTHORACIC VASCULAR SURGERY)

## 2023-11-22 PROCEDURE — 99024 PR POST-OP FOLLOW-UP VISIT: ICD-10-PCS | Mod: S$GLB,,, | Performed by: THORACIC SURGERY (CARDIOTHORACIC VASCULAR SURGERY)

## 2023-11-22 PROCEDURE — 4010F PR ACE/ARB THEARPY RXD/TAKEN: ICD-10-PCS | Mod: CPTII,S$GLB,, | Performed by: THORACIC SURGERY (CARDIOTHORACIC VASCULAR SURGERY)

## 2023-11-22 PROCEDURE — 3074F PR MOST RECENT SYSTOLIC BLOOD PRESSURE < 130 MM HG: ICD-10-PCS | Mod: CPTII,S$GLB,, | Performed by: THORACIC SURGERY (CARDIOTHORACIC VASCULAR SURGERY)

## 2023-11-22 PROCEDURE — 3074F SYST BP LT 130 MM HG: CPT | Mod: CPTII,S$GLB,, | Performed by: THORACIC SURGERY (CARDIOTHORACIC VASCULAR SURGERY)

## 2023-11-22 PROCEDURE — 1159F MED LIST DOCD IN RCRD: CPT | Mod: CPTII,S$GLB,, | Performed by: THORACIC SURGERY (CARDIOTHORACIC VASCULAR SURGERY)

## 2023-11-22 PROCEDURE — 1157F ADVNC CARE PLAN IN RCRD: CPT | Mod: CPTII,S$GLB,, | Performed by: THORACIC SURGERY (CARDIOTHORACIC VASCULAR SURGERY)

## 2023-11-22 PROCEDURE — 99999 PR PBB SHADOW E&M-EST. PATIENT-LVL V: CPT | Mod: PBBFAC,,, | Performed by: THORACIC SURGERY (CARDIOTHORACIC VASCULAR SURGERY)

## 2023-11-22 PROCEDURE — 3044F HG A1C LEVEL LT 7.0%: CPT | Mod: CPTII,S$GLB,, | Performed by: THORACIC SURGERY (CARDIOTHORACIC VASCULAR SURGERY)

## 2023-11-22 PROCEDURE — 3078F PR MOST RECENT DIASTOLIC BLOOD PRESSURE < 80 MM HG: ICD-10-PCS | Mod: CPTII,S$GLB,, | Performed by: THORACIC SURGERY (CARDIOTHORACIC VASCULAR SURGERY)

## 2023-11-22 PROCEDURE — 99999 PR PBB SHADOW E&M-EST. PATIENT-LVL V: ICD-10-PCS | Mod: PBBFAC,,, | Performed by: THORACIC SURGERY (CARDIOTHORACIC VASCULAR SURGERY)

## 2023-11-22 PROCEDURE — 4010F ACE/ARB THERAPY RXD/TAKEN: CPT | Mod: CPTII,S$GLB,, | Performed by: THORACIC SURGERY (CARDIOTHORACIC VASCULAR SURGERY)

## 2023-11-22 PROCEDURE — 1159F PR MEDICATION LIST DOCUMENTED IN MEDICAL RECORD: ICD-10-PCS | Mod: CPTII,S$GLB,, | Performed by: THORACIC SURGERY (CARDIOTHORACIC VASCULAR SURGERY)

## 2023-11-22 PROCEDURE — 3078F DIAST BP <80 MM HG: CPT | Mod: CPTII,S$GLB,, | Performed by: THORACIC SURGERY (CARDIOTHORACIC VASCULAR SURGERY)

## 2023-11-22 NOTE — PROGRESS NOTES
"Subjective:      Patient ID: Carlin Isaac is a 68 y.o. male.    Chief Complaint: Post-op Evaluation    HPI:   The patient is a 62-year-old L who is status post coronary artery bypass grafting x3 for severe left main coronary artery disease which was associated with sudden death.  The patient has no complaints.  Denies chest pain, ankle swelling, orthopnea or PND.  Review of patient's allergies indicates:  No Known Allergies         Objective:   /66   Pulse (!) 51   Resp 16   Ht 6' 3" (1.905 m)   Wt 91.9 kg (202 lb 9.6 oz)   SpO2 96%   BMI 25.32 kg/m²     X-Ray Chest AP Portable  Narrative: EXAMINATION:  XR CHEST AP PORTABLE    CLINICAL HISTORY:  Post-op;    COMPARISON:  11/03/2023    FINDINGS:  The right internal jugular venous Burke-Jordan line remains in place.  The size of the heart is prominent.  There is a mild amount of alveolar consolidation in the base of the right lung.  There is no focal pulmonary infiltrate visualized in the left lung.  There is no pneumothorax.  The costophrenic angles are sharp.  Impression: 1. The right internal jugular venous Burke-Jordan line remains in place.  2. There is a mild amount of alveolar consolidation in the base of the right lung.  An infectious process cannot be excluded.  .    Electronically signed by: Héctor Nowak MD  Date:    11/04/2023  Time:    07:10         Physical Exam  Constitutional:       Appearance: Normal appearance.   HENT:      Head: Normocephalic and atraumatic.      Nose: Nose normal.   Cardiovascular:      Rate and Rhythm: Bradycardia present.      Pulses: Normal pulses.      Heart sounds: Normal heart sounds.   Pulmonary:      Effort: Pulmonary effort is normal.      Breath sounds: Normal breath sounds.   Abdominal:      General: Abdomen is flat. Bowel sounds are normal.      Palpations: Abdomen is soft.   Musculoskeletal:      Right lower leg: No edema.      Left lower leg: No edema.   Skin:     General: Skin is warm and dry. "   Neurological:      Mental Status: He is alert and oriented to person, place, and time.   Psychiatric:         Behavior: Behavior normal.         Assessment:     1. S/P CABG x 3          Plan   The patient is status post coronary artery bypass grafting x3.  Overall the patient is doing well.    Continue aspirin Plavix and low-dose rivaroxaban.    Continue atorvastatin  Continue lisinopril and metoprolol  Continue amiodarone.  Patient is bradycardic however patient is completely asymptomatic.      Return to clinic in 2 weeks.      Oscar Cazares [facility]

## 2023-11-29 ENCOUNTER — TELEPHONE (OUTPATIENT)
Dept: CARDIAC REHAB | Facility: HOSPITAL | Age: 68
End: 2023-11-29
Payer: MEDICARE

## 2023-11-29 NOTE — TELEPHONE ENCOUNTER
Spoke with pt regarding his interest in cardiac rehab. Pt is interested and is aware of our wait list. Pt wishes to remain on our waitlist. Will follow up with pt when a spot becomes available. Pt stated understanding.

## 2023-12-06 ENCOUNTER — OFFICE VISIT (OUTPATIENT)
Dept: CARDIOTHORACIC SURGERY | Facility: CLINIC | Age: 68
End: 2023-12-06
Payer: MEDICARE

## 2023-12-06 ENCOUNTER — HOSPITAL ENCOUNTER (OUTPATIENT)
Dept: CARDIOLOGY | Facility: HOSPITAL | Age: 68
Discharge: HOME | End: 2023-12-06
Attending: INTERNAL MEDICINE
Payer: MEDICARE

## 2023-12-06 ENCOUNTER — OFFICE VISIT (OUTPATIENT)
Dept: CARDIOLOGY | Facility: CLINIC | Age: 68
End: 2023-12-06
Payer: MEDICARE

## 2023-12-06 VITALS
HEIGHT: 75 IN | HEART RATE: 60 BPM | RESPIRATION RATE: 16 BRPM | SYSTOLIC BLOOD PRESSURE: 121 MMHG | BODY MASS INDEX: 25.61 KG/M2 | DIASTOLIC BLOOD PRESSURE: 74 MMHG | OXYGEN SATURATION: 100 % | WEIGHT: 205.94 LBS

## 2023-12-06 VITALS
HEIGHT: 75 IN | DIASTOLIC BLOOD PRESSURE: 60 MMHG | WEIGHT: 205 LBS | HEART RATE: 55 BPM | BODY MASS INDEX: 25.49 KG/M2 | SYSTOLIC BLOOD PRESSURE: 112 MMHG

## 2023-12-06 DIAGNOSIS — I47.20 VT (VENTRICULAR TACHYCARDIA): ICD-10-CM

## 2023-12-06 DIAGNOSIS — Z95.1 S/P CABG X 3: ICD-10-CM

## 2023-12-06 DIAGNOSIS — I21.4 NSTEMI (NON-ST ELEVATED MYOCARDIAL INFARCTION): ICD-10-CM

## 2023-12-06 DIAGNOSIS — I10 PRIMARY HYPERTENSION: ICD-10-CM

## 2023-12-06 DIAGNOSIS — I49.01 VENTRICULAR FIBRILLATION: Primary | ICD-10-CM

## 2023-12-06 DIAGNOSIS — I46.9 CARDIAC ARREST: ICD-10-CM

## 2023-12-06 DIAGNOSIS — I49.01 VENTRICULAR FIBRILLATION: ICD-10-CM

## 2023-12-06 PROCEDURE — 99205 OFFICE O/P NEW HI 60 MIN: CPT | Mod: S$GLB,,, | Performed by: INTERNAL MEDICINE

## 2023-12-06 PROCEDURE — 99999 PR PBB SHADOW E&M-EST. PATIENT-LVL V: ICD-10-PCS | Mod: PBBFAC,,, | Performed by: INTERNAL MEDICINE

## 2023-12-06 PROCEDURE — 3044F PR MOST RECENT HEMOGLOBIN A1C LEVEL <7.0%: ICD-10-PCS | Mod: CPTII,S$GLB,, | Performed by: INTERNAL MEDICINE

## 2023-12-06 PROCEDURE — 1157F ADVNC CARE PLAN IN RCRD: CPT | Mod: CPTII,S$GLB,, | Performed by: INTERNAL MEDICINE

## 2023-12-06 PROCEDURE — 1157F PR ADVANCE CARE PLAN OR EQUIV PRESENT IN MEDICAL RECORD: ICD-10-PCS | Mod: CPTII,S$GLB,, | Performed by: THORACIC SURGERY (CARDIOTHORACIC VASCULAR SURGERY)

## 2023-12-06 PROCEDURE — 1157F PR ADVANCE CARE PLAN OR EQUIV PRESENT IN MEDICAL RECORD: ICD-10-PCS | Mod: CPTII,S$GLB,, | Performed by: INTERNAL MEDICINE

## 2023-12-06 PROCEDURE — 1111F DSCHRG MED/CURRENT MED MERGE: CPT | Mod: CPTII,S$GLB,, | Performed by: INTERNAL MEDICINE

## 2023-12-06 PROCEDURE — 4010F ACE/ARB THERAPY RXD/TAKEN: CPT | Mod: CPTII,S$GLB,, | Performed by: THORACIC SURGERY (CARDIOTHORACIC VASCULAR SURGERY)

## 2023-12-06 PROCEDURE — 3044F HG A1C LEVEL LT 7.0%: CPT | Mod: CPTII,S$GLB,, | Performed by: THORACIC SURGERY (CARDIOTHORACIC VASCULAR SURGERY)

## 2023-12-06 PROCEDURE — 3074F PR MOST RECENT SYSTOLIC BLOOD PRESSURE < 130 MM HG: ICD-10-PCS | Mod: CPTII,S$GLB,, | Performed by: INTERNAL MEDICINE

## 2023-12-06 PROCEDURE — 3078F PR MOST RECENT DIASTOLIC BLOOD PRESSURE < 80 MM HG: ICD-10-PCS | Mod: CPTII,S$GLB,, | Performed by: INTERNAL MEDICINE

## 2023-12-06 PROCEDURE — 1160F PR REVIEW ALL MEDS BY PRESCRIBER/CLIN PHARMACIST DOCUMENTED: ICD-10-PCS | Mod: CPTII,S$GLB,, | Performed by: INTERNAL MEDICINE

## 2023-12-06 PROCEDURE — 3288F PR FALLS RISK ASSESSMENT DOCUMENTED: ICD-10-PCS | Mod: CPTII,S$GLB,, | Performed by: THORACIC SURGERY (CARDIOTHORACIC VASCULAR SURGERY)

## 2023-12-06 PROCEDURE — 3078F DIAST BP <80 MM HG: CPT | Mod: CPTII,S$GLB,, | Performed by: INTERNAL MEDICINE

## 2023-12-06 PROCEDURE — 1160F RVW MEDS BY RX/DR IN RCRD: CPT | Mod: CPTII,S$GLB,, | Performed by: INTERNAL MEDICINE

## 2023-12-06 PROCEDURE — 99024 PR POST-OP FOLLOW-UP VISIT: ICD-10-PCS | Mod: S$GLB,,, | Performed by: THORACIC SURGERY (CARDIOTHORACIC VASCULAR SURGERY)

## 2023-12-06 PROCEDURE — 3288F FALL RISK ASSESSMENT DOCD: CPT | Mod: CPTII,S$GLB,, | Performed by: INTERNAL MEDICINE

## 2023-12-06 PROCEDURE — 1101F PR PT FALLS ASSESS DOC 0-1 FALLS W/OUT INJ PAST YR: ICD-10-PCS | Mod: CPTII,S$GLB,, | Performed by: THORACIC SURGERY (CARDIOTHORACIC VASCULAR SURGERY)

## 2023-12-06 PROCEDURE — 93010 ELECTROCARDIOGRAM REPORT: CPT | Mod: ,,, | Performed by: INTERNAL MEDICINE

## 2023-12-06 PROCEDURE — 3078F DIAST BP <80 MM HG: CPT | Mod: CPTII,S$GLB,, | Performed by: THORACIC SURGERY (CARDIOTHORACIC VASCULAR SURGERY)

## 2023-12-06 PROCEDURE — 3288F FALL RISK ASSESSMENT DOCD: CPT | Mod: CPTII,S$GLB,, | Performed by: THORACIC SURGERY (CARDIOTHORACIC VASCULAR SURGERY)

## 2023-12-06 PROCEDURE — 1159F PR MEDICATION LIST DOCUMENTED IN MEDICAL RECORD: ICD-10-PCS | Mod: CPTII,S$GLB,, | Performed by: INTERNAL MEDICINE

## 2023-12-06 PROCEDURE — 3074F SYST BP LT 130 MM HG: CPT | Mod: CPTII,S$GLB,, | Performed by: INTERNAL MEDICINE

## 2023-12-06 PROCEDURE — 3078F PR MOST RECENT DIASTOLIC BLOOD PRESSURE < 80 MM HG: ICD-10-PCS | Mod: CPTII,S$GLB,, | Performed by: THORACIC SURGERY (CARDIOTHORACIC VASCULAR SURGERY)

## 2023-12-06 PROCEDURE — 1126F AMNT PAIN NOTED NONE PRSNT: CPT | Mod: CPTII,S$GLB,, | Performed by: INTERNAL MEDICINE

## 2023-12-06 PROCEDURE — 99024 POSTOP FOLLOW-UP VISIT: CPT | Mod: S$GLB,,, | Performed by: THORACIC SURGERY (CARDIOTHORACIC VASCULAR SURGERY)

## 2023-12-06 PROCEDURE — 3288F PR FALLS RISK ASSESSMENT DOCUMENTED: ICD-10-PCS | Mod: CPTII,S$GLB,, | Performed by: INTERNAL MEDICINE

## 2023-12-06 PROCEDURE — 4010F ACE/ARB THERAPY RXD/TAKEN: CPT | Mod: CPTII,S$GLB,, | Performed by: INTERNAL MEDICINE

## 2023-12-06 PROCEDURE — 3044F PR MOST RECENT HEMOGLOBIN A1C LEVEL <7.0%: ICD-10-PCS | Mod: CPTII,S$GLB,, | Performed by: THORACIC SURGERY (CARDIOTHORACIC VASCULAR SURGERY)

## 2023-12-06 PROCEDURE — 3044F HG A1C LEVEL LT 7.0%: CPT | Mod: CPTII,S$GLB,, | Performed by: INTERNAL MEDICINE

## 2023-12-06 PROCEDURE — 93010 EKG 12-LEAD: ICD-10-PCS | Mod: ,,, | Performed by: INTERNAL MEDICINE

## 2023-12-06 PROCEDURE — 4010F PR ACE/ARB THEARPY RXD/TAKEN: ICD-10-PCS | Mod: CPTII,S$GLB,, | Performed by: THORACIC SURGERY (CARDIOTHORACIC VASCULAR SURGERY)

## 2023-12-06 PROCEDURE — 99999 PR PBB SHADOW E&M-EST. PATIENT-LVL V: CPT | Mod: PBBFAC,,, | Performed by: INTERNAL MEDICINE

## 2023-12-06 PROCEDURE — 1111F PR DISCHARGE MEDS RECONCILED W/ CURRENT OUTPATIENT MED LIST: ICD-10-PCS | Mod: CPTII,S$GLB,, | Performed by: INTERNAL MEDICINE

## 2023-12-06 PROCEDURE — 99205 PR OFFICE/OUTPT VISIT, NEW, LEVL V, 60-74 MIN: ICD-10-PCS | Mod: S$GLB,,, | Performed by: INTERNAL MEDICINE

## 2023-12-06 PROCEDURE — 1101F PR PT FALLS ASSESS DOC 0-1 FALLS W/OUT INJ PAST YR: ICD-10-PCS | Mod: CPTII,S$GLB,, | Performed by: INTERNAL MEDICINE

## 2023-12-06 PROCEDURE — 1157F ADVNC CARE PLAN IN RCRD: CPT | Mod: CPTII,S$GLB,, | Performed by: THORACIC SURGERY (CARDIOTHORACIC VASCULAR SURGERY)

## 2023-12-06 PROCEDURE — 3008F PR BODY MASS INDEX (BMI) DOCUMENTED: ICD-10-PCS | Mod: CPTII,S$GLB,, | Performed by: INTERNAL MEDICINE

## 2023-12-06 PROCEDURE — 3074F PR MOST RECENT SYSTOLIC BLOOD PRESSURE < 130 MM HG: ICD-10-PCS | Mod: CPTII,S$GLB,, | Performed by: THORACIC SURGERY (CARDIOTHORACIC VASCULAR SURGERY)

## 2023-12-06 PROCEDURE — 1126F PR PAIN SEVERITY QUANTIFIED, NO PAIN PRESENT: ICD-10-PCS | Mod: CPTII,S$GLB,, | Performed by: INTERNAL MEDICINE

## 2023-12-06 PROCEDURE — 93005 ELECTROCARDIOGRAM TRACING: CPT

## 2023-12-06 PROCEDURE — 1159F MED LIST DOCD IN RCRD: CPT | Mod: CPTII,S$GLB,, | Performed by: INTERNAL MEDICINE

## 2023-12-06 PROCEDURE — 99999 PR PBB SHADOW E&M-EST. PATIENT-LVL V: CPT | Mod: PBBFAC,,, | Performed by: THORACIC SURGERY (CARDIOTHORACIC VASCULAR SURGERY)

## 2023-12-06 PROCEDURE — 99999 PR PBB SHADOW E&M-EST. PATIENT-LVL V: ICD-10-PCS | Mod: PBBFAC,,, | Performed by: THORACIC SURGERY (CARDIOTHORACIC VASCULAR SURGERY)

## 2023-12-06 PROCEDURE — 3074F SYST BP LT 130 MM HG: CPT | Mod: CPTII,S$GLB,, | Performed by: THORACIC SURGERY (CARDIOTHORACIC VASCULAR SURGERY)

## 2023-12-06 PROCEDURE — 1101F PT FALLS ASSESS-DOCD LE1/YR: CPT | Mod: CPTII,S$GLB,, | Performed by: INTERNAL MEDICINE

## 2023-12-06 PROCEDURE — 1101F PT FALLS ASSESS-DOCD LE1/YR: CPT | Mod: CPTII,S$GLB,, | Performed by: THORACIC SURGERY (CARDIOTHORACIC VASCULAR SURGERY)

## 2023-12-06 PROCEDURE — 3008F BODY MASS INDEX DOCD: CPT | Mod: CPTII,S$GLB,, | Performed by: INTERNAL MEDICINE

## 2023-12-06 PROCEDURE — 4010F PR ACE/ARB THEARPY RXD/TAKEN: ICD-10-PCS | Mod: CPTII,S$GLB,, | Performed by: INTERNAL MEDICINE

## 2023-12-06 RX ORDER — LISINOPRIL 5 MG/1
5 TABLET ORAL DAILY
Qty: 90 TABLET | Refills: 3 | Status: SHIPPED | OUTPATIENT
Start: 2023-12-06 | End: 2024-12-05

## 2023-12-06 NOTE — PROGRESS NOTES
Subjective:   Patient ID:  Carlin Isaac is a 68 y.o. male who presents for evaluation of CABG and VF      68 yoM here for arrhythmia management. He suffered aborted SCD 10/31/23 leading to urgent LHC that revealed 3vD. CABG was performed 11/2/23. He had no subsequent arrhythmias. His EF was 60-65% post op. He has made a full recovery. Amiodarone was given on discharge for his VF.     ECho 11/23:  ·  Left Ventricle: The left ventricle is normal in size. Normal wall thickness. Normal wall motion. There is normal systolic function with a visually estimated ejection fraction of 60 - 65%. There is normal diastolic function.  ·  Right Ventricle: Normal right ventricular cavity size. Wall thickness is normal. Right ventricle wall motion  is normal. Systolic function is normal.  ·  IVC/SVC: Normal venous pressure at 3 mmHg.    Past Medical History:  No date: Kidney stone    Past Surgical History:  11/2/2023: CORONARY ARTERY BYPASS GRAFT (CABG); N/A      Comment:  Procedure: CORONARY ARTERY BYPASS GRAFT (CABG);                 Surgeon: Oscar Cazares MD;  Location: [facility] OR;                 Service: Cardiothoracic;  Laterality: N/A;  3-VESSEL WITH               EPI-AORTIC ULTRASOUND  11/2/2023: ECHOCARDIOGRAM,TRANSESOPHAGEAL; N/A      Comment:  Procedure: ECHOCARDIOGRAM,TRANSESOPHAGEAL;  Surgeon:                Oscar Cazares MD;  Location: [facility] OR;  Service:                Cardiothoracic;  Laterality: N/A;  11/2/2023: ENDOSCOPIC HARVEST OF VEIN; Left      Comment:  Procedure: SURGICAL PROCUREMENT, VEIN, ENDOSCOPIC;                 Surgeon: Oscar Cazares MD;  Location: [facility] OR;                 Service: Cardiothoracic;  Laterality: Left;  11/2/2023: INJECTION OF ANESTHETIC AGENT AROUND MULTIPLE INTERCOSTAL   NERVES; N/A      Comment:  Procedure: BLOCK, NERVE, INTERCOSTAL, 2 OR MORE;                 Surgeon: Oscar Cazares MD;  Location: [facility] OR;                 Service: Cardiothoracic;  Laterality: N/A;                  PARA-STERNALNERVE BLOCK  10/31/2023: INSERTION OF INTRAVASCULAR MICROAXIAL BLOOD PUMP; N/A      Comment:  Procedure: INSERTION, IMPELLA;  Surgeon: Baldev Mathew MD;  Location: [facility] CATH LAB;  Service: Cardiology;                 Laterality: N/A;  10/31/2023: LEFT HEART CATHETERIZATION; Left      Comment:  Procedure: Left heart cath, with possible Impella;                 Surgeon: Baldev Mathew MD;  Location: [facility] CATH LAB;                 Service: Cardiology;  Laterality: Left;    Social History    Socioeconomic History      Marital status: Single    Tobacco Use      Smoking status: Never      Smokeless tobacco: Never    Substance and Sexual Activity      Alcohol use: Never      Drug use: Never    Review of patient's family history indicates:  Problem: Prostate cancer      Relation: Father          Age of Onset: (Not Specified)    Current Outpatient Medications:  amiodarone (PACERONE) 200 MG Tab, Take 1 tablet (200 mg total) by mouth 2 (two) times daily., Disp: 60 tablet, Rfl: 0  ascorbic acid, vitamin C, (VITAMIN C) 500 MG tablet, Take 1 tablet (500 mg total) by mouth 2 (two) times daily., Disp: 60 tablet, Rfl: 0  aspirin (ECOTRIN) 81 MG EC tablet, Take 1 tablet (81 mg total) by mouth once daily., Disp: 90 tablet, Rfl: 3  atorvastatin (LIPITOR) 80 MG tablet, Take 1 tablet (80 mg total) by mouth once daily., Disp: 90 tablet, Rfl: 3  clopidogreL (PLAVIX) 75 mg tablet, Take 1 tablet (75 mg total) by mouth once daily., Disp: 90 tablet, Rfl: 3  cyanocobalamin (VITAMIN B-12) 1000 MCG tablet, Take 1 tablet (1,000 mcg total) by mouth once daily., Disp: 30 tablet, Rfl: 0  docusate sodium (COLACE) 100 MG capsule, Take 1 capsule (100 mg total) by mouth 2 (two) times daily., Disp: 60 capsule, Rfl: 0  ferrous sulfate 325 (65 FE) MG EC tablet, Take 1 tablet (325 mg total) by mouth once daily., Disp: 30 tablet, Rfl: 0  folic acid (FOLVITE) 1 MG tablet, Take 1 tablet (1 mg total) by mouth once daily.,  Disp: 30 tablet, Rfl: 0  ibuprofen (ADVIL,MOTRIN) 800 MG tablet, Take 800 mg by mouth 3 (three) times daily as needed., Disp: , Rfl:   lisinopriL (PRINIVIL,ZESTRIL) 5 MG tablet, Take 1 tablet (5 mg total) by mouth once daily., Disp: 90 tablet, Rfl: 3  magnesium hydroxide 400 mg/5 ml (MILK OF MAGNESIA) 400 mg/5 mL Susp, Take 5 mLs (400 mg total) by mouth 2 (two) times a day., Disp: 300 mL, Rfl: 0  metoprolol succinate (TOPROL-XL) 25 MG 24 hr tablet, Take 25 mg by mouth once daily., Disp: , Rfl:   oxyCODONE-acetaminophen (PERCOCET)  mg per tablet, Take 1 tablet by mouth every 6 (six) hours as needed., Disp: , Rfl:   pantoprazole (PROTONIX) 40 MG tablet, Take 1 tablet (40 mg total) by mouth once daily., Disp: 90 tablet, Rfl: 0  polyethylene glycol (GLYCOLAX) 17 gram/dose powder, Measure 17 g in cap, dissolve in liquid then take by mouth once daily., Disp: 510 g, Rfl: 0  rivaroxaban (XARELTO) 2.5 mg Tab, Take 1 tablet (2.5 mg total) by mouth 2 (two) times daily with meals., Disp: 180 tablet, Rfl: 3  testosterone cypionate (DEPOTESTOTERONE CYPIONATE) 200 mg/mL injection, Inject 200 mg into the muscle every 7 days., Disp: , Rfl:   furosemide (LASIX) 40 MG tablet, Take 1 tablet (40 mg total) by mouth 2 (two) times daily. for 7 days, Disp: 14 tablet, Rfl: 0    No current facility-administered medications for this visit.          Review of Systems   Constitutional: Negative.   HENT: Negative.     Eyes: Negative.    Cardiovascular:  Negative for chest pain, dyspnea on exertion, leg swelling, near-syncope, palpitations and syncope.   Respiratory: Negative.  Negative for shortness of breath.    Endocrine: Negative.    Hematologic/Lymphatic: Negative.    Skin: Negative.    Musculoskeletal: Negative.    Gastrointestinal: Negative.    Genitourinary: Negative.    Neurological: Negative.  Negative for dizziness and light-headedness.   Psychiatric/Behavioral: Negative.     Allergic/Immunologic: Negative.        Objective:    Physical Exam  Vitals reviewed.   Constitutional:       General: He is not in acute distress.     Appearance: He is well-developed.   HENT:      Head: Normocephalic and atraumatic.   Eyes:      Pupils: Pupils are equal, round, and reactive to light.   Neck:      Thyroid: No thyromegaly.      Vascular: No JVD.   Cardiovascular:      Rate and Rhythm: Normal rate and regular rhythm.      Chest Wall: PMI is not displaced.      Heart sounds: Normal heart sounds, S1 normal and S2 normal. No murmur heard.     No friction rub. No gallop.   Pulmonary:      Effort: Pulmonary effort is normal. No respiratory distress.      Breath sounds: Normal breath sounds. No wheezing or rales.   Abdominal:      General: Bowel sounds are normal. There is no distension.      Palpations: Abdomen is soft.      Tenderness: There is no abdominal tenderness. There is no guarding or rebound.   Musculoskeletal:         General: No tenderness. Normal range of motion.      Cervical back: Normal range of motion.   Skin:     General: Skin is warm and dry.      Findings: No erythema or rash.   Neurological:      Mental Status: He is alert and oriented to person, place, and time.      Cranial Nerves: No cranial nerve deficit.   Psychiatric:         Behavior: Behavior normal.         Thought Content: Thought content normal.         Judgment: Judgment normal.       ECG: SBR nl ID, QRS, QTc    Assessment:      1. Ventricular fibrillation    2. S/P CABG x 3    3. NSTEMI (non-ST elevated myocardial infarction)    4. Primary hypertension    5. VT (ventricular tachycardia)        Plan:     68 yoM here for arrhythmia assessment. He had aborted SCD in the setting of severe ischemic CAD. He has since been revascularized with normal EF. He has had no subsequent arrhythmias. He does not meet criteria for ICD. Should his upcoming holter be normal, he can stop amiodarone. RTC as needed

## 2023-12-06 NOTE — PROGRESS NOTES
"Subjective:      Patient ID: Carlin Isaac is a 68 y.o. male.    Chief Complaint: Follow-up    HPI:   The patient is a [age]-year-old male who is status post coronary artery bypass grafting x3 for severe left main coronary artery disease which was associated with sudden death.  Patient currently has no physical complaints.  However the patient with a smart watch and noticed that occasionally he will see the watch port a faster heart rate than his baseline.  Patient showed an episode where his heart rate was 78 per minute..  He he denies any chest pain, orthopnea or PND.  Denies any fever or chills or constipation.  Review of patient's allergies indicates:  No Known Allergies         Objective:   /74   Pulse 60   Resp 16   Ht 6' 3" (1.905 m)   Wt 93.4 kg (205 lb 14.6 oz)   SpO2 100%   BMI 25.74 kg/m²     X-Ray Chest AP Portable  Narrative: EXAMINATION:  XR CHEST AP PORTABLE    CLINICAL HISTORY:  Post-op;    COMPARISON:  11/03/2023    FINDINGS:  The right internal jugular venous Wishram-Jordan line remains in place.  The size of the heart is prominent.  There is a mild amount of alveolar consolidation in the base of the right lung.  There is no focal pulmonary infiltrate visualized in the left lung.  There is no pneumothorax.  The costophrenic angles are sharp.  Impression: 1. The right internal jugular venous Wishram-Jordan line remains in place.  2. There is a mild amount of alveolar consolidation in the base of the right lung.  An infectious process cannot be excluded.  .    Electronically signed by: Héctor Nowak MD  Date:    11/04/2023  Time:    07:10         Physical Exam  Constitutional:       Appearance: Normal appearance.   HENT:      Head: Normocephalic and atraumatic.      Nose: Nose normal.   Cardiovascular:      Rate and Rhythm: Normal rate and regular rhythm.      Heart sounds: Normal heart sounds.   Pulmonary:      Effort: Pulmonary effort is normal.      Breath sounds: Normal breath sounds. "   Musculoskeletal:      Right lower leg: No edema.      Left lower leg: No edema.   Skin:     General: Skin is warm and dry.   Neurological:      Mental Status: He is alert and oriented to person, place, and time.   Psychiatric:         Mood and Affect: Mood normal.         Behavior: Behavior normal.         Assessment:     1. Ventricular fibrillation    2. Primary hypertension    3. VT (ventricular tachycardia)    4. Cardiac arrest      Status post coronary artery bypass grafting x3    Plan   The patient is a 68-year-old male status post cardiac arrest followed by coronary artery bypass grafting x3.  Overall the patient is doing well.  Patient has no complaints.  Patient noticed occasional increased heart rate on his smart watch.    Will obtain a Holter monitor.  Will place an EP consult.  Continue aspirin Plavix and low-dose rivaroxaban  Continue atorvastatin   Continue amiodarone   Continue metoprolol  Continue lisinopril  Patient will be [facility] patient may return as needed.        Mark Awolesi, [facility]

## 2023-12-08 ENCOUNTER — HOSPITAL ENCOUNTER (OUTPATIENT)
Dept: CARDIOLOGY | Facility: HOSPITAL | Age: 68
Discharge: HOME | End: 2023-12-08
Attending: THORACIC SURGERY (CARDIOTHORACIC VASCULAR SURGERY)
Payer: MEDICARE

## 2023-12-08 ENCOUNTER — OFFICE VISIT (OUTPATIENT)
Dept: CARDIOLOGY | Facility: CLINIC | Age: 68
End: 2023-12-08
Payer: MEDICARE

## 2023-12-08 VITALS — BODY MASS INDEX: 25.63 KG/M2 | SYSTOLIC BLOOD PRESSURE: 120 MMHG | WEIGHT: 205 LBS | DIASTOLIC BLOOD PRESSURE: 60 MMHG

## 2023-12-08 DIAGNOSIS — E78.00 PURE HYPERCHOLESTEROLEMIA: ICD-10-CM

## 2023-12-08 DIAGNOSIS — I49.01 VENTRICULAR FIBRILLATION: ICD-10-CM

## 2023-12-08 DIAGNOSIS — Z95.1 S/P CABG X 3: Primary | ICD-10-CM

## 2023-12-08 DIAGNOSIS — I46.9 CARDIAC ARREST: ICD-10-CM

## 2023-12-08 DIAGNOSIS — I47.20 VT (VENTRICULAR TACHYCARDIA): ICD-10-CM

## 2023-12-08 DIAGNOSIS — I21.4 NSTEMI (NON-ST ELEVATED MYOCARDIAL INFARCTION): ICD-10-CM

## 2023-12-08 DIAGNOSIS — I10 PRIMARY HYPERTENSION: ICD-10-CM

## 2023-12-08 PROCEDURE — 1101F PR PT FALLS ASSESS DOC 0-1 FALLS W/OUT INJ PAST YR: ICD-10-PCS | Mod: CPTII,S$GLB,, | Performed by: INTERNAL MEDICINE

## 2023-12-08 PROCEDURE — 99999 PR PBB SHADOW E&M-EST. PATIENT-LVL III: CPT | Mod: PBBFAC,,, | Performed by: INTERNAL MEDICINE

## 2023-12-08 PROCEDURE — 3044F PR MOST RECENT HEMOGLOBIN A1C LEVEL <7.0%: ICD-10-PCS | Mod: CPTII,S$GLB,, | Performed by: INTERNAL MEDICINE

## 2023-12-08 PROCEDURE — 1101F PT FALLS ASSESS-DOCD LE1/YR: CPT | Mod: CPTII,S$GLB,, | Performed by: INTERNAL MEDICINE

## 2023-12-08 PROCEDURE — 4010F ACE/ARB THERAPY RXD/TAKEN: CPT | Mod: CPTII,S$GLB,, | Performed by: INTERNAL MEDICINE

## 2023-12-08 PROCEDURE — 3008F PR BODY MASS INDEX (BMI) DOCUMENTED: ICD-10-PCS | Mod: CPTII,S$GLB,, | Performed by: INTERNAL MEDICINE

## 2023-12-08 PROCEDURE — 3288F PR FALLS RISK ASSESSMENT DOCUMENTED: ICD-10-PCS | Mod: CPTII,S$GLB,, | Performed by: INTERNAL MEDICINE

## 2023-12-08 PROCEDURE — 3008F BODY MASS INDEX DOCD: CPT | Mod: CPTII,S$GLB,, | Performed by: INTERNAL MEDICINE

## 2023-12-08 PROCEDURE — 3078F DIAST BP <80 MM HG: CPT | Mod: CPTII,S$GLB,, | Performed by: INTERNAL MEDICINE

## 2023-12-08 PROCEDURE — 1159F PR MEDICATION LIST DOCUMENTED IN MEDICAL RECORD: ICD-10-PCS | Mod: CPTII,S$GLB,, | Performed by: INTERNAL MEDICINE

## 2023-12-08 PROCEDURE — 3074F SYST BP LT 130 MM HG: CPT | Mod: CPTII,S$GLB,, | Performed by: INTERNAL MEDICINE

## 2023-12-08 PROCEDURE — 4010F PR ACE/ARB THEARPY RXD/TAKEN: ICD-10-PCS | Mod: CPTII,S$GLB,, | Performed by: INTERNAL MEDICINE

## 2023-12-08 PROCEDURE — 93227 XTRNL ECG REC<48 HR R&I: CPT | Mod: ,,, | Performed by: INTERNAL MEDICINE

## 2023-12-08 PROCEDURE — 93227 HOLTER MONITOR - 48 HOUR (CUPID ONLY): ICD-10-PCS | Mod: ,,, | Performed by: INTERNAL MEDICINE

## 2023-12-08 PROCEDURE — 1157F PR ADVANCE CARE PLAN OR EQUIV PRESENT IN MEDICAL RECORD: ICD-10-PCS | Mod: CPTII,S$GLB,, | Performed by: INTERNAL MEDICINE

## 2023-12-08 PROCEDURE — 3078F PR MOST RECENT DIASTOLIC BLOOD PRESSURE < 80 MM HG: ICD-10-PCS | Mod: CPTII,S$GLB,, | Performed by: INTERNAL MEDICINE

## 2023-12-08 PROCEDURE — 1157F ADVNC CARE PLAN IN RCRD: CPT | Mod: CPTII,S$GLB,, | Performed by: INTERNAL MEDICINE

## 2023-12-08 PROCEDURE — 99214 PR OFFICE/OUTPT VISIT, EST, LEVL IV, 30-39 MIN: ICD-10-PCS | Mod: S$GLB,,, | Performed by: INTERNAL MEDICINE

## 2023-12-08 PROCEDURE — 3288F FALL RISK ASSESSMENT DOCD: CPT | Mod: CPTII,S$GLB,, | Performed by: INTERNAL MEDICINE

## 2023-12-08 PROCEDURE — 99999 PR PBB SHADOW E&M-EST. PATIENT-LVL III: ICD-10-PCS | Mod: PBBFAC,,, | Performed by: INTERNAL MEDICINE

## 2023-12-08 PROCEDURE — 99214 OFFICE O/P EST MOD 30 MIN: CPT | Mod: S$GLB,,, | Performed by: INTERNAL MEDICINE

## 2023-12-08 PROCEDURE — 3074F PR MOST RECENT SYSTOLIC BLOOD PRESSURE < 130 MM HG: ICD-10-PCS | Mod: CPTII,S$GLB,, | Performed by: INTERNAL MEDICINE

## 2023-12-08 PROCEDURE — 1159F MED LIST DOCD IN RCRD: CPT | Mod: CPTII,S$GLB,, | Performed by: INTERNAL MEDICINE

## 2023-12-08 PROCEDURE — 93225 XTRNL ECG REC<48 HRS REC: CPT

## 2023-12-08 PROCEDURE — 3044F HG A1C LEVEL LT 7.0%: CPT | Mod: CPTII,S$GLB,, | Performed by: INTERNAL MEDICINE

## 2023-12-08 NOTE — PROGRESS NOTES
Subjective:   Patient ID:  Carlin Isaac is a 68 y.o. male who presents for follow-up of No chief complaint on file.  Pt is s/p 3 V CABG ( LM/vib) the patient with incisional pain.  Patient denies CP, angina or anginal equivalent.    Hypertension  This is a chronic problem. The current episode started more than 1 year ago. The problem has been gradually improving since onset. The problem is controlled. Pertinent negatives include no chest pain, palpitations or shortness of breath. Past treatments include beta blockers and ACE inhibitors. There are no compliance problems.    Coronary Artery Disease  Presents for follow-up visit. Pertinent negatives include no chest pain, chest pressure, chest tightness, dizziness, leg swelling, muscle weakness, palpitations, shortness of breath or weight gain. Risk factors include hyperlipidemia. The symptoms have been stable. Compliance with diet is good. Compliance with exercise is good. Compliance with medications is good.   Hyperlipidemia  This is a chronic problem. The current episode started more than 1 year ago. The problem is controlled. Pertinent negatives include no chest pain or shortness of breath. Current antihyperlipidemic treatment includes statins. The current treatment provides moderate improvement of lipids. There are no compliance problems.  Risk factors for coronary artery disease include hypertension, dyslipidemia, male sex and family history.       Review of Systems   Constitutional: Negative. Negative for weight gain.   HENT: Negative.     Eyes: Negative.    Cardiovascular: Negative.  Negative for chest pain, leg swelling and palpitations.   Respiratory: Negative.  Negative for chest tightness and shortness of breath.    Endocrine: Negative.    Hematologic/Lymphatic: Negative.    Skin: Negative.    Musculoskeletal:  Negative for muscle weakness.   Gastrointestinal: Negative.    Genitourinary: Negative.    Neurological: Negative.  Negative for dizziness.    Psychiatric/Behavioral: Negative.     Allergic/Immunologic: Negative.    All other systems reviewed and are negative.    Family History   Problem Relation Age of Onset    Prostate cancer Father      Past Medical History:   Diagnosis Date    Kidney stone      Social History     Socioeconomic History    Marital status: Single   Tobacco Use    Smoking status: Never    Smokeless tobacco: Never   Substance and Sexual Activity    Alcohol use: Never    Drug use: Never     Social Determinants of Health     Financial Resource Strain: Low Risk  (12/7/2023)    Overall Financial Resource Strain (CARDIA)     Difficulty of Paying Living Expenses: Not hard at all   Food Insecurity: No Food Insecurity (12/7/2023)    Hunger Vital Sign     Worried About Running Out of Food in the Last Year: Never true     Ran Out of Food in the Last Year: Never true   Transportation Needs: No Transportation Needs (12/7/2023)    PRAPARE - Transportation     Lack of Transportation (Medical): No     Lack of Transportation (Non-Medical): No   Physical Activity: Unknown (12/7/2023)    Exercise Vital Sign     Days of Exercise per Week: Patient refused   Stress: Stress Concern Present (12/7/2023)    [employer] [place] [employer] - Occupational Stress Questionnaire     Feeling of Stress : To some extent   Social Connections: Unknown (12/7/2023)    Social Connection and Isolation Panel [NHANES]     Frequency of Communication with Friends and Family: More than three times a week     Frequency of Social Gatherings with Friends and Family: Patient refused     Active Member of Clubs or Organizations: Yes     Attends Club or Organization Meetings: Patient refused     Marital Status: Living with partner   Housing Stability: Low Risk  (12/7/2023)    Housing Stability Vital Sign     Unable to Pay for Housing in the Last Year: No     Number of Places Lived in the Last Year: 1     Unstable Housing in the Last Year: No     Current Outpatient Medications on  File Prior to Visit   Medication Sig Dispense Refill    aspirin (ECOTRIN) 81 MG EC tablet Take 1 tablet (81 mg total) by mouth once daily. 90 tablet 3    atorvastatin (LIPITOR) 80 MG tablet Take 1 tablet (80 mg total) by mouth once daily. 90 tablet 3    clopidogreL (PLAVIX) 75 mg tablet Take 1 tablet (75 mg total) by mouth once daily. 90 tablet 3    cyanocobalamin (VITAMIN B-12) 1000 MCG tablet Take 1 tablet (1,000 mcg total) by mouth once daily. 30 tablet 0    folic acid (FOLVITE) 1 MG tablet Take 1 tablet (1 mg total) by mouth once daily. 30 tablet 0    ibuprofen (ADVIL,MOTRIN) 800 MG tablet Take 800 mg by mouth 3 (three) times daily as needed.      lisinopriL (PRINIVIL,ZESTRIL) 5 MG tablet Take 1 tablet (5 mg total) by mouth once daily. 90 tablet 3    metoprolol succinate (TOPROL-XL) 25 MG 24 hr tablet Take 25 mg by mouth once daily.      oxyCODONE-acetaminophen (PERCOCET)  mg per tablet Take 1 tablet by mouth every 6 (six) hours as needed.      pantoprazole (PROTONIX) 40 MG tablet Take 1 tablet (40 mg total) by mouth once daily. 90 tablet 0    polyethylene glycol (GLYCOLAX) 17 gram/dose powder Measure 17 g in cap, dissolve in liquid then take by mouth once daily. 510 g 0    rivaroxaban (XARELTO) 2.5 mg Tab Take 1 tablet (2.5 mg total) by mouth 2 (two) times daily with meals. 180 tablet 3    testosterone cypionate (DEPOTESTOTERONE CYPIONATE) 200 mg/mL injection Inject 200 mg into the muscle every 7 days.      amiodarone (PACERONE) 200 MG Tab Take 1 tablet (200 mg total) by mouth 2 (two) times daily. 60 tablet 0    [] ascorbic acid, vitamin C, (VITAMIN C) 500 MG tablet Take 1 tablet (500 mg total) by mouth 2 (two) times daily. 60 tablet 0    [] docusate sodium (COLACE) 100 MG capsule Take 1 capsule (100 mg total) by mouth 2 (two) times daily. 60 capsule 0    [] ferrous sulfate 325 (65 FE) MG EC tablet Take 1 tablet (325 mg total) by mouth once daily. 30 tablet 0    furosemide (LASIX) 40  MG tablet Take 1 tablet (40 mg total) by mouth 2 (two) times daily. for 7 days 14 tablet 0    [] magnesium hydroxide 400 mg/5 ml (MILK OF MAGNESIA) 400 mg/5 mL Susp Take 5 mLs (400 mg total) by mouth 2 (two) times a day. 300 mL 0     No current facility-administered medications on file prior to visit.     Review of patient's allergies indicates:  No Known Allergies    Objective:     Physical Exam  Vitals and nursing note reviewed.   Constitutional:       Appearance: He is well-developed.   HENT:      Head: Normocephalic and atraumatic.   Eyes:      Conjunctiva/sclera: Conjunctivae normal.      Pupils: Pupils are equal, round, and reactive to light.   Cardiovascular:      Rate and Rhythm: Normal rate and regular rhythm.      Pulses: Intact distal pulses.      Heart sounds: Normal heart sounds.   Pulmonary:      Effort: Pulmonary effort is normal.      Breath sounds: Normal breath sounds.   Abdominal:      General: Bowel sounds are normal.      Palpations: Abdomen is soft.   Musculoskeletal:      Cervical back: Normal range of motion and neck supple.   Skin:     General: Skin is warm and dry.   Neurological:      Mental Status: He is alert and oriented to person, place, and time.         Assessment:     1. S/P CABG x 3    2. Pure hypercholesterolemia    3. Primary hypertension    4. NSTEMI (non-ST elevated myocardial infarction)    5. VT (ventricular tachycardia)    6. Ventricular fibrillation    7. Cardiac arrest        Plan:     S/P CABG x 3    Pure hypercholesterolemia    Primary hypertension    NSTEMI (non-ST elevated myocardial infarction)    VT (ventricular tachycardia)    Ventricular fibrillation    Cardiac arrest      Continue statin-HLP  Continue asa, plavix- CAD/CABG  Continue lisinopril, metoprolol- HTN  Continue xarelto- PAF? Review chart

## 2023-12-13 LAB
OHS CV EVENT MONITOR DAY: 0
OHS CV HOLTER LENGTH DECIMAL HOURS: 47.98
OHS CV HOLTER LENGTH HOURS: 47
OHS CV HOLTER LENGTH MINUTES: 59
OHS CV HOLTER SINUS AVERAGE HR: 53
OHS CV HOLTER SINUS MAX HR: 97
OHS CV HOLTER SINUS MIN HR: 46

## 2023-12-21 ENCOUNTER — PATIENT MESSAGE (OUTPATIENT)
Dept: CARDIOLOGY | Facility: CLINIC | Age: 68
End: 2023-12-21
Payer: MEDICARE

## 2023-12-21 DIAGNOSIS — I21.4 NSTEMI (NON-ST ELEVATED MYOCARDIAL INFARCTION): ICD-10-CM

## 2023-12-21 DIAGNOSIS — Z95.1 S/P CABG X 3: Primary | ICD-10-CM

## 2023-12-26 ENCOUNTER — TELEPHONE (OUTPATIENT)
Dept: ELECTROPHYSIOLOGY | Facility: CLINIC | Age: 68
End: 2023-12-26
Payer: MEDICARE

## 2023-12-26 NOTE — TELEPHONE ENCOUNTER
Dr. Wheat reviewed patient's monitor results. Per Dr. Wheat, patient may stop his Amiodarone. Patient verbalized understanding. Instructed patient to call if he has any further concerns. Amiodarone removed from patient MAR.

## 2024-01-23 ENCOUNTER — EXTERNAL HOME HEALTH (OUTPATIENT)
Dept: HOME HEALTH SERVICES | Facility: HOSPITAL | Age: 69
End: 2024-01-23
Payer: MEDICARE

## 2024-01-23 ENCOUNTER — TELEPHONE (OUTPATIENT)
Dept: CARDIAC REHAB | Facility: HOSPITAL | Age: 69
End: 2024-01-23
Payer: MEDICARE

## 2024-01-25 ENCOUNTER — PATIENT MESSAGE (OUTPATIENT)
Dept: CARDIAC REHAB | Facility: HOSPITAL | Age: 69
End: 2024-01-25
Payer: MEDICARE

## 2024-01-25 ENCOUNTER — TELEPHONE (OUTPATIENT)
Dept: CARDIAC REHAB | Facility: HOSPITAL | Age: 69
End: 2024-01-25
Payer: MEDICARE

## 2024-01-25 NOTE — TELEPHONE ENCOUNTER
Called to discuss starting cardiac rehab. No answer, sent pt a [employer] message instructing him to return our call.

## 2024-02-06 ENCOUNTER — OFFICE VISIT (OUTPATIENT)
Dept: CARDIOLOGY | Facility: CLINIC | Age: 69
End: 2024-02-06
Payer: MEDICARE

## 2024-02-06 ENCOUNTER — PATIENT MESSAGE (OUTPATIENT)
Dept: CARDIOTHORACIC SURGERY | Facility: CLINIC | Age: 69
End: 2024-02-06
Payer: MEDICARE

## 2024-02-06 VITALS
OXYGEN SATURATION: 98 % | DIASTOLIC BLOOD PRESSURE: 75 MMHG | HEART RATE: 65 BPM | HEIGHT: 75 IN | SYSTOLIC BLOOD PRESSURE: 130 MMHG | RESPIRATION RATE: 16 BRPM | BODY MASS INDEX: 26.4 KG/M2 | WEIGHT: 212.31 LBS

## 2024-02-06 DIAGNOSIS — I21.4 NSTEMI (NON-ST ELEVATED MYOCARDIAL INFARCTION): ICD-10-CM

## 2024-02-06 DIAGNOSIS — I25.10 CORONARY ARTERY DISEASE INVOLVING NATIVE CORONARY ARTERY OF NATIVE HEART WITHOUT ANGINA PECTORIS: ICD-10-CM

## 2024-02-06 DIAGNOSIS — E78.49 OTHER HYPERLIPIDEMIA: ICD-10-CM

## 2024-02-06 DIAGNOSIS — I49.01 VENTRICULAR FIBRILLATION: ICD-10-CM

## 2024-02-06 DIAGNOSIS — I47.20 VT (VENTRICULAR TACHYCARDIA): ICD-10-CM

## 2024-02-06 DIAGNOSIS — Z95.1 S/P CABG X 3: ICD-10-CM

## 2024-02-06 DIAGNOSIS — I10 PRIMARY HYPERTENSION: ICD-10-CM

## 2024-02-06 DIAGNOSIS — I46.9 CARDIAC ARREST: Primary | ICD-10-CM

## 2024-02-06 PROCEDURE — 99214 OFFICE O/P EST MOD 30 MIN: CPT | Mod: S$GLB,,, | Performed by: INTERNAL MEDICINE

## 2024-02-06 PROCEDURE — 99999 PR PBB SHADOW E&M-EST. PATIENT-LVL III: CPT | Mod: PBBFAC,,, | Performed by: INTERNAL MEDICINE

## 2024-02-06 RX ORDER — CLOPIDOGREL BISULFATE 75 MG/1
75 TABLET ORAL DAILY
Qty: 90 TABLET | Refills: 3 | Status: SHIPPED | OUTPATIENT
Start: 2024-02-06 | End: 2025-02-05

## 2024-02-06 NOTE — PROGRESS NOTES
Subjective:   Patient ID:  Carlin Isaac is a 68 y.o. male who presents for follow up of No chief complaint on file.      HPI  A 67 yo male with out of hospital CARDIAC ARREST HTN CHRON'S DISEASE HLP HAD LMCA DSIEASE HAD CABG AFTER IMPELLA WAS PLACED. WAS EVALUATED BY EP AND IS HERE FRO F/U. HE WAS EVALUATED BY DR SMITH. HE SUGGESTED STOPPING AMIO NO FURTHER INTERVENTION SINCE LVF IS NORMAL.   He is asymptomatic  he is fairly active exercises regularily. He needs knee surgery.  Past Medical History:   Diagnosis Date    Kidney stone        Past Surgical History:   Procedure Laterality Date    CORONARY ARTERY BYPASS GRAFT (CABG) N/A 11/2/2023    Procedure: CORONARY ARTERY BYPASS GRAFT (CABG);  Surgeon: Osacr Cazares MD;  Location: [facility] OR;  Service: Cardiothoracic;  Laterality: N/A;  3-VESSEL WITH EPI-AORTIC ULTRASOUND    ECHOCARDIOGRAM,TRANSESOPHAGEAL N/A 11/2/2023    Procedure: ECHOCARDIOGRAM,TRANSESOPHAGEAL;  Surgeon: Oscar Cazares MD;  Location: [facility] OR;  Service: Cardiothoracic;  Laterality: N/A;    ENDOSCOPIC HARVEST OF VEIN Left 11/2/2023    Procedure: SURGICAL PROCUREMENT, VEIN, ENDOSCOPIC;  Surgeon: Oscar Cazares MD;  Location: [facility] OR;  Service: Cardiothoracic;  Laterality: Left;    INJECTION OF ANESTHETIC AGENT AROUND MULTIPLE INTERCOSTAL NERVES N/A 11/2/2023    Procedure: BLOCK, NERVE, INTERCOSTAL, 2 OR MORE;  Surgeon: Oscar Cazares MD;  Location: [facility] OR;  Service: Cardiothoracic;  Laterality: N/A;  PARA-STERNALNERVE BLOCK    INSERTION OF INTRAVASCULAR MICROAXIAL BLOOD PUMP N/A 10/31/2023    Procedure: INSERTION, IMPELLA;  Surgeon: Baldev Mathew MD;  Location: [facility] CATH LAB;  Service: Cardiology;  Laterality: N/A;    LEFT HEART CATHETERIZATION Left 10/31/2023    Procedure: Left heart cath, with possible Impella;  Surgeon: Baldev Mathew MD;  Location: [facility] CATH LAB;  Service: Cardiology;  Laterality: Left;       Social History     Tobacco Use    Smoking status: Never    Smokeless  tobacco: Never   Substance Use Topics    Alcohol use: Never    Drug use: Never       Family History   Problem Relation Age of Onset    Prostate cancer Father        Current Outpatient Medications   Medication Sig    aspirin (ECOTRIN) 81 MG EC tablet Take 1 tablet (81 mg total) by mouth once daily.    atorvastatin (LIPITOR) 80 MG tablet Take 1 tablet (80 mg total) by mouth once daily.    clopidogreL (PLAVIX) 75 mg tablet Take 1 tablet (75 mg total) by mouth once daily.    ibuprofen (ADVIL,MOTRIN) 800 MG tablet Take 800 mg by mouth 3 (three) times daily as needed.    lisinopriL (PRINIVIL,ZESTRIL) 5 MG tablet Take 1 tablet (5 mg total) by mouth once daily.    metoprolol succinate (TOPROL-XL) 25 MG 24 hr tablet Take 25 mg by mouth once daily.    oxyCODONE-acetaminophen (PERCOCET)  mg per tablet Take 1 tablet by mouth every 6 (six) hours as needed.    pantoprazole (PROTONIX) 40 MG tablet Take 1 tablet (40 mg total) by mouth once daily.    rivaroxaban (XARELTO) 2.5 mg Tab Take 1 tablet (2.5 mg total) by mouth 2 (two) times daily with meals.    testosterone cypionate (DEPOTESTOTERONE CYPIONATE) 200 mg/mL injection Inject 200 mg into the muscle every 7 days.    folic acid (FOLVITE) 1 MG tablet Take 1 tablet (1 mg total) by mouth once daily.    furosemide (LASIX) 40 MG tablet Take 1 tablet (40 mg total) by mouth 2 (two) times daily. for 7 days     No current facility-administered medications for this visit.     Current Outpatient Medications on File Prior to Visit   Medication Sig    aspirin (ECOTRIN) 81 MG EC tablet Take 1 tablet (81 mg total) by mouth once daily.    atorvastatin (LIPITOR) 80 MG tablet Take 1 tablet (80 mg total) by mouth once daily.    clopidogreL (PLAVIX) 75 mg tablet Take 1 tablet (75 mg total) by mouth once daily.    ibuprofen (ADVIL,MOTRIN) 800 MG tablet Take 800 mg by mouth 3 (three) times daily as needed.    lisinopriL (PRINIVIL,ZESTRIL) 5 MG tablet Take 1 tablet (5 mg total) by mouth once  daily.    metoprolol succinate (TOPROL-XL) 25 MG 24 hr tablet Take 25 mg by mouth once daily.    oxyCODONE-acetaminophen (PERCOCET)  mg per tablet Take 1 tablet by mouth every 6 (six) hours as needed.    pantoprazole (PROTONIX) 40 MG tablet Take 1 tablet (40 mg total) by mouth once daily.    rivaroxaban (XARELTO) 2.5 mg Tab Take 1 tablet (2.5 mg total) by mouth 2 (two) times daily with meals.    testosterone cypionate (DEPOTESTOTERONE CYPIONATE) 200 mg/mL injection Inject 200 mg into the muscle every 7 days.    folic acid (FOLVITE) 1 MG tablet Take 1 tablet (1 mg total) by mouth once daily.    furosemide (LASIX) 40 MG tablet Take 1 tablet (40 mg total) by mouth 2 (two) times daily. for 7 days     No current facility-administered medications on file prior to visit.     Review of patient's allergies indicates:  No Known Allergies   Review of Systems   Constitutional: Negative for malaise/fatigue.   Eyes:  Negative for blurred vision.   Cardiovascular:  Negative for chest pain, claudication, cyanosis, dyspnea on exertion, irregular heartbeat, leg swelling, near-syncope, orthopnea, palpitations and paroxysmal nocturnal dyspnea.   Respiratory:  Negative for cough, hemoptysis and shortness of breath.    Hematologic/Lymphatic: Negative for bleeding problem. Does not bruise/bleed easily.   Skin:  Negative for dry skin and itching.   Musculoskeletal:  Positive for arthritis and joint pain. Negative for falls, muscle weakness and myalgias.   Gastrointestinal:  Negative for abdominal pain, diarrhea, heartburn, hematemesis, hematochezia and melena.   Genitourinary:  Negative for flank pain and hematuria.   Neurological:  Negative for dizziness, focal weakness, headaches, light-headedness, numbness, paresthesias, seizures and weakness.   Psychiatric/Behavioral:  Negative for altered mental status and memory loss. The patient is not nervous/anxious.    Allergic/Immunologic: Negative for hives.       Objective:   Physical  "Exam  Vitals and nursing note reviewed.   Constitutional:       General: He is not in acute distress.     Appearance: He is well-developed. He is not diaphoretic.   HENT:      Head: Normocephalic and atraumatic.   Eyes:      General:         Right eye: No discharge.         Left eye: No discharge.      Pupils: Pupils are equal, round, and reactive to light.   Neck:      Thyroid: No thyromegaly.      Vascular: No JVD.   Cardiovascular:      Rate and Rhythm: Normal rate and regular rhythm.      Pulses: Normal pulses and intact distal pulses.      Heart sounds: Normal heart sounds. No murmur heard.     No friction rub. No gallop.   Pulmonary:      Effort: Pulmonary effort is normal. No respiratory distress.      Breath sounds: Normal breath sounds. No wheezing or rales.      Comments: Scar well healed.   Chest:      Chest wall: No tenderness.   Abdominal:      General: Bowel sounds are normal. There is no distension.      Palpations: Abdomen is soft.      Tenderness: There is no abdominal tenderness.   Musculoskeletal:         General: Normal range of motion.      Cervical back: Neck supple.      Right lower leg: No edema.      Left lower leg: No edema.   Skin:     General: Skin is warm and dry.      Findings: No erythema or rash.   Neurological:      General: No focal deficit present.      Mental Status: He is alert and oriented to person, place, and time.      Cranial Nerves: No cranial nerve deficit.   Psychiatric:         Mood and Affect: Mood normal.         Behavior: Behavior normal.       Vitals:    02/06/24 1518 02/06/24 1519   BP: 130/70 130/75   Pulse: 65    Resp: 16    SpO2: 98%    Weight: 96.3 kg (212 lb 4.9 oz)    Height: 6' 3" (1.905 m)      Lab Results   Component Value Date    CHOL 80 (L) 05/30/2023      Body mass index is 26.54 kg/m².   Lab Results   Component Value Date    HGBA1C 5.5 11/01/2023      BMP  Lab Results   Component Value Date     11/07/2023    K 4.2 11/07/2023     11/07/2023 " "   CO2 28 11/07/2023    BUN 24 (H) 11/07/2023    CREATININE 1.1 11/07/2023    CALCIUM 8.3 (L) 11/07/2023    ANIONGAP 10 11/07/2023    EGFRNORACEVR >60 11/07/2023      Lab Results   Component Value Date    HDL 24 (L) 05/30/2023     Lab Results   Component Value Date    LDLCALC 43 05/30/2023     Lab Results   Component Value Date    TRIG 151 (H) 10/30/2023    TRIG 51 05/30/2023     No results found for: "CHOLHDL"    Chemistry        Component Value Date/Time     11/07/2023 0507    K 4.2 11/07/2023 0507     11/07/2023 0507    CO2 28 11/07/2023 0507    BUN 24 (H) 11/07/2023 0507    CREATININE 1.1 11/07/2023 0507     11/07/2023 0507        Component Value Date/Time    CALCIUM 8.3 (L) 11/07/2023 0507    ALKPHOS 46 (L) 11/05/2023 0517    AST 47 (H) 11/05/2023 0517    ALT 55 (H) 11/05/2023 0517    BILITOT 0.4 11/05/2023 0517          No results found for: "TSH"  Lab Results   Component Value Date    INR 1.5 (H) 11/03/2023    INR 1.3 (H) 11/02/2023    INR 1.3 (H) 11/02/2023     Lab Results   Component Value Date    WBC 11.53 11/07/2023    HGB 8.0 (L) 11/07/2023    HCT 25.8 (L) 11/07/2023    MCV 94 11/07/2023     11/07/2023     BMP  Sodium   Date Value Ref Range Status   11/07/2023 138 136 - 145 mmol/L Final     Potassium   Date Value Ref Range Status   11/07/2023 4.2 3.5 - 5.1 mmol/L Final     Chloride   Date Value Ref Range Status   11/07/2023 100 95 - 110 mmol/L Final     CO2   Date Value Ref Range Status   11/07/2023 28 23 - 29 mmol/L Final     BUN   Date Value Ref Range Status   11/07/2023 24 (H) 8 - 23 mg/dL Final     Creatinine   Date Value Ref Range Status   11/07/2023 1.1 0.5 - 1.4 mg/dL Final     Calcium   Date Value Ref Range Status   11/07/2023 8.3 (L) 8.7 - 10.5 mg/dL Final     Anion Gap   Date Value Ref Range Status   11/07/2023 10 8 - 16 mmol/L Final     CrCl cannot be calculated (Patient's most recent lab result is older than the maximum 7 days allowed.).    Assessment:     1. Cardiac " arrest    2. Coronary artery disease involving native coronary artery of native heart without angina pectoris    3. Primary hypertension    4. Other hyperlipidemia    5. S/P CABG x 3    6. NSTEMI (non-ST elevated myocardial infarction)    7. VT (ventricular tachycardia)    8. Ventricular fibrillation    Htn controlled will increase lisinopril to 10 mg po daily. Low slat diet advised.  Hlp on statins tolerated well on target continue same.  Cad s/p cabg asymptomatic continue same he will reach out to DR RADER regarding xarelto duration.will continue plavix asa   He ahs excellent exercise tolerance back exercising will continue the same.   No signs of diabetes or glucose intolerance. Continue  same.  He needs knee surgery I see no contraindication to proceed with knee surgery cardiac wise.   Plan:   Continue current therapy  Cardiac low salt diet.  Risk factor modification and excercise program.  F/u in 6 months with lipid cmp

## 2024-02-07 ENCOUNTER — TELEPHONE (OUTPATIENT)
Dept: CARDIOTHORACIC SURGERY | Facility: CLINIC | Age: 69
End: 2024-02-07
Payer: MEDICARE

## 2024-02-07 ENCOUNTER — PATIENT MESSAGE (OUTPATIENT)
Dept: CARDIOLOGY | Facility: CLINIC | Age: 69
End: 2024-02-07
Payer: MEDICARE

## 2024-02-07 NOTE — TELEPHONE ENCOUNTER
Patient contacted via my chart.    Please take the supply you have. Dr. Cazares would like to discontinue the medication after that. There will be no refills.     Thanks for reaching out to the clinic.

## 2024-02-19 PROBLEM — I21.4 NSTEMI (NON-ST ELEVATED MYOCARDIAL INFARCTION): Status: RESOLVED | Noted: 2023-11-13 | Resolved: 2024-02-19

## 2024-03-27 ENCOUNTER — PATIENT MESSAGE (OUTPATIENT)
Dept: CARDIOLOGY | Facility: CLINIC | Age: 69
End: 2024-03-27
Payer: MEDICARE

## 2024-04-08 ENCOUNTER — TELEPHONE (OUTPATIENT)
Dept: CARDIOTHORACIC SURGERY | Facility: CLINIC | Age: 69
End: 2024-04-08
Payer: MEDICARE

## 2024-04-08 NOTE — TELEPHONE ENCOUNTER
Provider response.    It should be okay to stop blood thinners prior to knee reconstruction as per orthopedics usual protocols. Thanks     Contacted Dr. Muller's office to ask them to fax over a form to have provider to sign. The nurse was provided [phone] to send the approval over.      11- Patient had surgery. He is scheduled for knee reconstruction surgery April 15, 2024 with Dr. Satya Muller. He would like advice on the blood thinners per Dr. Mathew. Thanks

## 2024-08-08 ENCOUNTER — OFFICE VISIT (OUTPATIENT)
Dept: CARDIOLOGY | Facility: CLINIC | Age: 69
End: 2024-08-08
Payer: MEDICARE

## 2024-08-08 VITALS
OXYGEN SATURATION: 98 % | HEART RATE: 67 BPM | BODY MASS INDEX: 25.27 KG/M2 | SYSTOLIC BLOOD PRESSURE: 160 MMHG | WEIGHT: 202.19 LBS | DIASTOLIC BLOOD PRESSURE: 90 MMHG

## 2024-08-08 DIAGNOSIS — I47.20 VT (VENTRICULAR TACHYCARDIA): ICD-10-CM

## 2024-08-08 DIAGNOSIS — Z95.1 S/P CABG X 3: ICD-10-CM

## 2024-08-08 DIAGNOSIS — I49.01 VENTRICULAR FIBRILLATION: ICD-10-CM

## 2024-08-08 DIAGNOSIS — I10 PRIMARY HYPERTENSION: ICD-10-CM

## 2024-08-08 DIAGNOSIS — I25.10 CORONARY ARTERY DISEASE INVOLVING NATIVE CORONARY ARTERY OF NATIVE HEART WITHOUT ANGINA PECTORIS: ICD-10-CM

## 2024-08-08 DIAGNOSIS — E78.5 HYPERLIPIDEMIA, UNSPECIFIED HYPERLIPIDEMIA TYPE: ICD-10-CM

## 2024-08-08 DIAGNOSIS — I46.9 CARDIAC ARREST: Primary | ICD-10-CM

## 2024-08-08 PROBLEM — Z95.811 LVAD (LEFT VENTRICULAR ASSIST DEVICE) PRESENT: Status: RESOLVED | Noted: 2024-08-08 | Resolved: 2024-08-08

## 2024-08-08 PROBLEM — Z95.811 LVAD (LEFT VENTRICULAR ASSIST DEVICE) PRESENT: Status: ACTIVE | Noted: 2024-08-08

## 2024-08-08 PROCEDURE — 99999 PR PBB SHADOW E&M-EST. PATIENT-LVL IV: CPT | Mod: PBBFAC,,, | Performed by: INTERNAL MEDICINE

## 2024-08-08 PROCEDURE — 1126F AMNT PAIN NOTED NONE PRSNT: CPT | Mod: CPTII,S$GLB,, | Performed by: INTERNAL MEDICINE

## 2024-08-08 PROCEDURE — 3044F HG A1C LEVEL LT 7.0%: CPT | Mod: CPTII,S$GLB,, | Performed by: INTERNAL MEDICINE

## 2024-08-08 PROCEDURE — 3288F FALL RISK ASSESSMENT DOCD: CPT | Mod: CPTII,S$GLB,, | Performed by: INTERNAL MEDICINE

## 2024-08-08 PROCEDURE — 3077F SYST BP >= 140 MM HG: CPT | Mod: CPTII,S$GLB,, | Performed by: INTERNAL MEDICINE

## 2024-08-08 PROCEDURE — 3080F DIAST BP >= 90 MM HG: CPT | Mod: CPTII,S$GLB,, | Performed by: INTERNAL MEDICINE

## 2024-08-08 PROCEDURE — 1159F MED LIST DOCD IN RCRD: CPT | Mod: CPTII,S$GLB,, | Performed by: INTERNAL MEDICINE

## 2024-08-08 PROCEDURE — 1157F ADVNC CARE PLAN IN RCRD: CPT | Mod: CPTII,S$GLB,, | Performed by: INTERNAL MEDICINE

## 2024-08-08 PROCEDURE — 3008F BODY MASS INDEX DOCD: CPT | Mod: CPTII,S$GLB,, | Performed by: INTERNAL MEDICINE

## 2024-08-08 PROCEDURE — 1101F PT FALLS ASSESS-DOCD LE1/YR: CPT | Mod: CPTII,S$GLB,, | Performed by: INTERNAL MEDICINE

## 2024-08-08 PROCEDURE — 1160F RVW MEDS BY RX/DR IN RCRD: CPT | Mod: CPTII,S$GLB,, | Performed by: INTERNAL MEDICINE

## 2024-08-08 PROCEDURE — 99214 OFFICE O/P EST MOD 30 MIN: CPT | Mod: S$GLB,,, | Performed by: INTERNAL MEDICINE

## 2024-08-08 PROCEDURE — 4010F ACE/ARB THERAPY RXD/TAKEN: CPT | Mod: CPTII,S$GLB,, | Performed by: INTERNAL MEDICINE

## 2024-08-08 RX ORDER — ANASTROZOLE 1 MG/1
1 TABLET ORAL
COMMUNITY
Start: 2024-05-17

## 2024-08-08 RX ORDER — AMOXICILLIN 500 MG
1 CAPSULE ORAL EVERY MORNING
COMMUNITY

## 2024-08-08 RX ORDER — LISINOPRIL 20 MG/1
20 TABLET ORAL 2 TIMES DAILY
Qty: 60 TABLET | Refills: 6 | Status: SHIPPED | OUTPATIENT
Start: 2024-08-08

## 2024-08-08 RX ORDER — METOPROLOL SUCCINATE 50 MG/1
1 TABLET, EXTENDED RELEASE ORAL EVERY MORNING
COMMUNITY
Start: 2024-02-26

## 2024-08-08 RX ORDER — LISINOPRIL 20 MG/1
1 TABLET ORAL EVERY MORNING
COMMUNITY
Start: 2024-05-02 | End: 2024-08-08 | Stop reason: SDUPTHER

## 2024-08-08 RX ORDER — ROSUVASTATIN CALCIUM 20 MG/1
1 TABLET, COATED ORAL EVERY MORNING
COMMUNITY
Start: 2024-06-18

## 2024-08-08 RX ORDER — EZETIMIBE 10 MG/1
1 TABLET ORAL EVERY MORNING
COMMUNITY
Start: 2024-02-26

## 2024-08-08 RX ORDER — LANOLIN ALCOHOL/MO/W.PET/CERES
1 CREAM (GRAM) TOPICAL EVERY MORNING
COMMUNITY

## 2024-08-20 ENCOUNTER — PATIENT MESSAGE (OUTPATIENT)
Dept: CARDIOLOGY | Facility: CLINIC | Age: 69
End: 2024-08-20
Payer: MEDICARE

## 2024-12-11 DIAGNOSIS — I10 PRIMARY HYPERTENSION: ICD-10-CM

## 2024-12-11 RX ORDER — LISINOPRIL 20 MG/1
20 TABLET ORAL 2 TIMES DAILY
Qty: 60 TABLET | Refills: 6 | Status: SHIPPED | OUTPATIENT
Start: 2024-12-11

## 2025-01-27 NOTE — PLAN OF CARE
PT AAOx4, on 3LNC, maxT overnight 101.7 (navdeep tylenol given). A/V pacer wires in place, currently A paced at 90 (underlying rate 85).   Epi infusing and titrated per order.  Q1 accuchecks, insulin gtt titrated per nomogram.  R IJ swan in place, L radial yumiko, R femoral impella in place.  CT X4, Pleural and Mediastinal with controlled O.P.  Varela in place with 45-60ml UOP/hr.  Electrolytes replaced per order (see mar)    CHG bath given, full linen changed,  POC reviewed with PT, all questions encouraged and answered.  All alarms audible and appropriate.  Call light in reach, instructed to call for any assistance.        Pt OQ discharge completed

## 2025-01-31 ENCOUNTER — LAB VISIT (OUTPATIENT)
Dept: LAB | Facility: HOSPITAL | Age: 70
End: 2025-01-31
Attending: INTERNAL MEDICINE
Payer: MEDICARE

## 2025-01-31 DIAGNOSIS — I25.10 CORONARY ARTERY DISEASE INVOLVING NATIVE CORONARY ARTERY OF NATIVE HEART WITHOUT ANGINA PECTORIS: ICD-10-CM

## 2025-01-31 DIAGNOSIS — E78.5 HYPERLIPIDEMIA, UNSPECIFIED HYPERLIPIDEMIA TYPE: ICD-10-CM

## 2025-01-31 DIAGNOSIS — I10 PRIMARY HYPERTENSION: ICD-10-CM

## 2025-01-31 LAB
ALBUMIN SERPL BCP-MCNC: 4.4 G/DL (ref 3.5–5.2)
ALP SERPL-CCNC: 49 U/L (ref 40–150)
ALT SERPL W/O P-5'-P-CCNC: 55 U/L (ref 10–44)
ANION GAP SERPL CALC-SCNC: 10 MMOL/L (ref 8–16)
AST SERPL-CCNC: 31 U/L (ref 10–40)
BILIRUB SERPL-MCNC: 0.8 MG/DL (ref 0.1–1)
BUN SERPL-MCNC: 28 MG/DL (ref 8–23)
CALCIUM SERPL-MCNC: 9.8 MG/DL (ref 8.7–10.5)
CHLORIDE SERPL-SCNC: 107 MMOL/L (ref 95–110)
CHOLEST SERPL-MCNC: 100 MG/DL (ref 120–199)
CHOLEST/HDLC SERPL: 3.6 {RATIO} (ref 2–5)
CO2 SERPL-SCNC: 25 MMOL/L (ref 23–29)
CREAT SERPL-MCNC: 1.3 MG/DL (ref 0.5–1.4)
EST. GFR  (NO RACE VARIABLE): 59.8 ML/MIN/1.73 M^2
GLUCOSE SERPL-MCNC: 96 MG/DL (ref 70–110)
HDLC SERPL-MCNC: 28 MG/DL (ref 40–75)
HDLC SERPL: 28 % (ref 20–50)
LDLC SERPL CALC-MCNC: 58 MG/DL (ref 63–159)
NONHDLC SERPL-MCNC: 72 MG/DL
POTASSIUM SERPL-SCNC: 4.7 MMOL/L (ref 3.5–5.1)
PROT SERPL-MCNC: 7.3 G/DL (ref 6–8.4)
SODIUM SERPL-SCNC: 142 MMOL/L (ref 136–145)
TRIGL SERPL-MCNC: 70 MG/DL (ref 30–150)

## 2025-01-31 PROCEDURE — 80061 LIPID PANEL: CPT | Performed by: INTERNAL MEDICINE

## 2025-01-31 PROCEDURE — 80053 COMPREHEN METABOLIC PANEL: CPT | Performed by: INTERNAL MEDICINE

## 2025-01-31 PROCEDURE — 36415 COLL VENOUS BLD VENIPUNCTURE: CPT | Performed by: INTERNAL MEDICINE

## 2025-02-04 DIAGNOSIS — I10 PRIMARY HYPERTENSION: Primary | ICD-10-CM

## 2025-02-11 ENCOUNTER — OFFICE VISIT (OUTPATIENT)
Dept: CARDIOLOGY | Facility: CLINIC | Age: 70
End: 2025-02-11
Payer: MEDICARE

## 2025-02-11 ENCOUNTER — HOSPITAL ENCOUNTER (OUTPATIENT)
Dept: CARDIOLOGY | Facility: HOSPITAL | Age: 70
Discharge: HOME | End: 2025-02-11
Attending: INTERNAL MEDICINE
Payer: MEDICARE

## 2025-02-11 VITALS
BODY MASS INDEX: 25.64 KG/M2 | SYSTOLIC BLOOD PRESSURE: 142 MMHG | DIASTOLIC BLOOD PRESSURE: 88 MMHG | HEART RATE: 55 BPM | OXYGEN SATURATION: 97 % | WEIGHT: 205.13 LBS

## 2025-02-11 DIAGNOSIS — Z95.1 S/P CABG X 3: ICD-10-CM

## 2025-02-11 DIAGNOSIS — I47.20 VT (VENTRICULAR TACHYCARDIA): ICD-10-CM

## 2025-02-11 DIAGNOSIS — I49.01 VENTRICULAR FIBRILLATION: ICD-10-CM

## 2025-02-11 DIAGNOSIS — I25.2 OLD MI (MYOCARDIAL INFARCTION): ICD-10-CM

## 2025-02-11 DIAGNOSIS — I25.10 CORONARY ARTERY DISEASE INVOLVING NATIVE CORONARY ARTERY OF NATIVE HEART WITHOUT ANGINA PECTORIS: Primary | ICD-10-CM

## 2025-02-11 DIAGNOSIS — I10 PRIMARY HYPERTENSION: ICD-10-CM

## 2025-02-11 DIAGNOSIS — I46.9 CARDIAC ARREST: ICD-10-CM

## 2025-02-11 LAB
OHS QRS DURATION: 98 MS
OHS QTC CALCULATION: 440 MS

## 2025-02-11 PROCEDURE — 1159F MED LIST DOCD IN RCRD: CPT | Mod: CPTII,S$GLB,, | Performed by: INTERNAL MEDICINE

## 2025-02-11 PROCEDURE — 93005 ELECTROCARDIOGRAM TRACING: CPT

## 2025-02-11 PROCEDURE — 3008F BODY MASS INDEX DOCD: CPT | Mod: CPTII,S$GLB,, | Performed by: INTERNAL MEDICINE

## 2025-02-11 PROCEDURE — 93010 ELECTROCARDIOGRAM REPORT: CPT | Mod: ,,, | Performed by: INTERNAL MEDICINE

## 2025-02-11 PROCEDURE — 1160F RVW MEDS BY RX/DR IN RCRD: CPT | Mod: CPTII,S$GLB,, | Performed by: INTERNAL MEDICINE

## 2025-02-11 PROCEDURE — 1101F PT FALLS ASSESS-DOCD LE1/YR: CPT | Mod: CPTII,S$GLB,, | Performed by: INTERNAL MEDICINE

## 2025-02-11 PROCEDURE — 3079F DIAST BP 80-89 MM HG: CPT | Mod: CPTII,S$GLB,, | Performed by: INTERNAL MEDICINE

## 2025-02-11 PROCEDURE — 99999 PR PBB SHADOW E&M-EST. PATIENT-LVL IV: CPT | Mod: PBBFAC,,, | Performed by: INTERNAL MEDICINE

## 2025-02-11 PROCEDURE — 1126F AMNT PAIN NOTED NONE PRSNT: CPT | Mod: CPTII,S$GLB,, | Performed by: INTERNAL MEDICINE

## 2025-02-11 PROCEDURE — 1157F ADVNC CARE PLAN IN RCRD: CPT | Mod: CPTII,S$GLB,, | Performed by: INTERNAL MEDICINE

## 2025-02-11 PROCEDURE — 3077F SYST BP >= 140 MM HG: CPT | Mod: CPTII,S$GLB,, | Performed by: INTERNAL MEDICINE

## 2025-02-11 PROCEDURE — 3288F FALL RISK ASSESSMENT DOCD: CPT | Mod: CPTII,S$GLB,, | Performed by: INTERNAL MEDICINE

## 2025-02-11 PROCEDURE — 99214 OFFICE O/P EST MOD 30 MIN: CPT | Mod: S$GLB,,, | Performed by: INTERNAL MEDICINE

## 2025-02-11 RX ORDER — TERBINAFINE HYDROCHLORIDE 250 MG/1
250 TABLET ORAL
COMMUNITY
Start: 2025-01-11

## 2025-02-11 RX ORDER — PREGABALIN 50 MG/1
50 CAPSULE ORAL 2 TIMES DAILY
COMMUNITY
Start: 2025-01-20

## 2025-02-11 NOTE — PROGRESS NOTES
Subjective:   Patient ID:  Carlin Isaac is a 69 y.o. male who presents for follow up of Follow-up      HPI  2/6/2024  A 67 yo male with out of hospital CARDIAC ARREST HTN CHRON'S DISEASE HLP HAD LMCA DSIEASE HAD CABG AFTER IMPELLA WAS PLACED. WAS EVALUATED BY EP AND IS HERE FRO F/U. HE WAS EVALUATED BY DR SMITH. HE SUGGESTED STOPPING AMIO NO FURTHER INTERVENTION SINCE LVF IS NORMAL.   He is asymptomatic  he is fairly active exercises regularily. He needs knee surgery.     8/8/2024  Here forf /u has been active physically asymptomatic exercises regularily  he claims compliance with medical therapy low salt diet . He is compliant with emdical tehrapy .     2/11/2025   Asymptomatic doing well no cardiac symptoms exercises regularily .he stopped plavix and xarelto he is on asa.compliant with meds htn imporved he needs to do better with salt intake.   Past Medical History:   Diagnosis Date    Kidney stone        Past Surgical History:   Procedure Laterality Date    CORONARY ARTERY BYPASS GRAFT (CABG) N/A 11/2/2023    Procedure: CORONARY ARTERY BYPASS GRAFT (CABG);  Surgeon: Oscar Cazares MD;  Location: [facility] OR;  Service: Cardiothoracic;  Laterality: N/A;  3-VESSEL WITH EPI-AORTIC ULTRASOUND    ECHOCARDIOGRAM,TRANSESOPHAGEAL N/A 11/2/2023    Procedure: ECHOCARDIOGRAM,TRANSESOPHAGEAL;  Surgeon: Oscar Cazares MD;  Location: [facility] OR;  Service: Cardiothoracic;  Laterality: N/A;    ENDOSCOPIC HARVEST OF VEIN Left 11/2/2023    Procedure: SURGICAL PROCUREMENT, VEIN, ENDOSCOPIC;  Surgeon: Oscar Cazares MD;  Location: [facility] OR;  Service: Cardiothoracic;  Laterality: Left;    INJECTION OF ANESTHETIC AGENT AROUND MULTIPLE INTERCOSTAL NERVES N/A 11/2/2023    Procedure: BLOCK, NERVE, INTERCOSTAL, 2 OR MORE;  Surgeon: Oscar Cazares MD;  Location: [facility] OR;  Service: Cardiothoracic;  Laterality: N/A;  PARA-STERNALNERVE BLOCK    INSERTION OF INTRAVASCULAR MICROAXIAL BLOOD PUMP N/A 10/31/2023    Procedure: INSERTION,  IMPELLA;  Surgeon: Baldev Mathew MD;  Location: [facility] CATH LAB;  Service: Cardiology;  Laterality: N/A;    LEFT HEART CATHETERIZATION Left 10/31/2023    Procedure: Left heart cath, with possible Impella;  Surgeon: Baldev Mathew MD;  Location: [facility] CATH LAB;  Service: Cardiology;  Laterality: Left;       Social History     Tobacco Use    Smoking status: Never    Smokeless tobacco: Never   Substance Use Topics    Alcohol use: Never    Drug use: Never       Family History   Problem Relation Name Age of Onset    Prostate cancer Father         Current Outpatient Medications   Medication Sig    anastrozole (ARIMIDEX) 1 mg Tab Take 1 mg by mouth 3 (three) times a week.    aspirin (ECOTRIN) 81 MG EC tablet Take 1 tablet (81 mg total) by mouth once daily.    atorvastatin (LIPITOR) 80 MG tablet Take 1 tablet (80 mg total) by mouth once daily.    cholecalciferol, vitD3,/vit K2 (VITAMIN D3-VITAMIN K2 ORAL) Take 1 capsule by mouth every morning.    clopidogreL (PLAVIX) 75 mg tablet Take 1 tablet (75 mg total) by mouth once daily.    ezetimibe (ZETIA) 10 mg tablet Take 1 tablet by mouth every morning.    ibuprofen (ADVIL,MOTRIN) 800 MG tablet Take 800 mg by mouth 3 (three) times daily as needed.    lisinopriL (PRINIVIL,ZESTRIL) 20 MG tablet Take 1 tablet (20 mg total) by mouth 2 (two) times a day.    magnesium oxide (MAG-OX) 400 mg (241.3 mg magnesium) tablet Take 1 tablet by mouth every morning.    metoprolol succinate (TOPROL-XL) 50 MG 24 hr tablet Take 1 tablet by mouth every morning.    omega-3 fatty acids/fish oil (FISH OIL-OMEGA-3 FATTY ACIDS) 300-1,000 mg capsule Take 1 capsule by mouth every morning.    oxyCODONE-acetaminophen (PERCOCET)  mg per tablet Take 1 tablet by mouth every 6 (six) hours as needed.    pregabalin (LYRICA) 50 MG capsule Take 50 mg by mouth 2 (two) times daily.    rosuvastatin (CRESTOR) 20 MG tablet Take 1 tablet by mouth every morning.    terbinafine HCL (LAMISIL) 250 mg tablet Take 250 mg  by mouth.    testosterone cypionate (DEPOTESTOTERONE CYPIONATE) 200 mg/mL injection Inject 200 mg into the muscle every 7 days.    folic acid (FOLVITE) 1 MG tablet Take 1 tablet (1 mg total) by mouth once daily. (Patient not taking: Reported on 2/11/2025)    furosemide (LASIX) 40 MG tablet Take 1 tablet (40 mg total) by mouth 2 (two) times daily. for 7 days    pantoprazole (PROTONIX) 40 MG tablet Take 1 tablet (40 mg total) by mouth once daily.    rivaroxaban (XARELTO) 2.5 mg Tab Take 1 tablet (2.5 mg total) by mouth 2 (two) times daily with meals.     No current facility-administered medications for this visit.     Current Outpatient Medications on File Prior to Visit   Medication Sig    anastrozole (ARIMIDEX) 1 mg Tab Take 1 mg by mouth 3 (three) times a week.    aspirin (ECOTRIN) 81 MG EC tablet Take 1 tablet (81 mg total) by mouth once daily.    atorvastatin (LIPITOR) 80 MG tablet Take 1 tablet (80 mg total) by mouth once daily.    cholecalciferol, vitD3,/vit K2 (VITAMIN D3-VITAMIN K2 ORAL) Take 1 capsule by mouth every morning.    clopidogreL (PLAVIX) 75 mg tablet Take 1 tablet (75 mg total) by mouth once daily.    ezetimibe (ZETIA) 10 mg tablet Take 1 tablet by mouth every morning.    ibuprofen (ADVIL,MOTRIN) 800 MG tablet Take 800 mg by mouth 3 (three) times daily as needed.    lisinopriL (PRINIVIL,ZESTRIL) 20 MG tablet Take 1 tablet (20 mg total) by mouth 2 (two) times a day.    magnesium oxide (MAG-OX) 400 mg (241.3 mg magnesium) tablet Take 1 tablet by mouth every morning.    metoprolol succinate (TOPROL-XL) 50 MG 24 hr tablet Take 1 tablet by mouth every morning.    omega-3 fatty acids/fish oil (FISH OIL-OMEGA-3 FATTY ACIDS) 300-1,000 mg capsule Take 1 capsule by mouth every morning.    oxyCODONE-acetaminophen (PERCOCET)  mg per tablet Take 1 tablet by mouth every 6 (six) hours as needed.    pregabalin (LYRICA) 50 MG capsule Take 50 mg by mouth 2 (two) times daily.    rosuvastatin (CRESTOR) 20 MG  tablet Take 1 tablet by mouth every morning.    terbinafine HCL (LAMISIL) 250 mg tablet Take 250 mg by mouth.    testosterone cypionate (DEPOTESTOTERONE CYPIONATE) 200 mg/mL injection Inject 200 mg into the muscle every 7 days.    folic acid (FOLVITE) 1 MG tablet Take 1 tablet (1 mg total) by mouth once daily. (Patient not taking: Reported on 2/11/2025)    furosemide (LASIX) 40 MG tablet Take 1 tablet (40 mg total) by mouth 2 (two) times daily. for 7 days    pantoprazole (PROTONIX) 40 MG tablet Take 1 tablet (40 mg total) by mouth once daily.    rivaroxaban (XARELTO) 2.5 mg Tab Take 1 tablet (2.5 mg total) by mouth 2 (two) times daily with meals.     No current facility-administered medications on file prior to visit.     Review of patient's allergies indicates:  No Known Allergies   Review of Systems   Constitutional: Negative for malaise/fatigue.   Eyes:  Negative for blurred vision.   Cardiovascular:  Negative for chest pain, claudication, cyanosis, dyspnea on exertion, irregular heartbeat, leg swelling, near-syncope, orthopnea, palpitations and paroxysmal nocturnal dyspnea.   Respiratory:  Negative for cough, hemoptysis and shortness of breath.    Hematologic/Lymphatic: Negative for bleeding problem. Does not bruise/bleed easily.   Skin:  Negative for dry skin and itching.   Musculoskeletal:  Negative for falls, muscle weakness and myalgias.   Gastrointestinal:  Negative for abdominal pain, diarrhea, heartburn, hematemesis, hematochezia and melena.   Genitourinary:  Negative for flank pain and hematuria.   Neurological:  Negative for dizziness, focal weakness, headaches, light-headedness, numbness, paresthesias, seizures and weakness.   Psychiatric/Behavioral:  Negative for altered mental status and memory loss. The patient is not nervous/anxious.    Allergic/Immunologic: Negative for hives.       Objective:   Physical Exam  Vitals and nursing note reviewed.   Constitutional:       General: He is not in acute  distress.     Appearance: He is well-developed. He is not diaphoretic.   HENT:      Head: Normocephalic and atraumatic.   Eyes:      General:         Right eye: No discharge.         Left eye: No discharge.      Pupils: Pupils are equal, round, and reactive to light.   Neck:      Thyroid: No thyromegaly.      Vascular: No JVD.   Cardiovascular:      Rate and Rhythm: Normal rate and regular rhythm.      Pulses: Normal pulses and intact distal pulses.      Heart sounds: Normal heart sounds. No murmur heard.     No friction rub. No gallop.   Pulmonary:      Effort: Pulmonary effort is normal. No respiratory distress.      Breath sounds: Normal breath sounds. No wheezing or rales.      Comments: Scar well healed  Chest:      Chest wall: No tenderness.   Abdominal:      General: Bowel sounds are normal. There is no distension.      Palpations: Abdomen is soft.      Tenderness: There is no abdominal tenderness.   Musculoskeletal:         General: Normal range of motion.      Cervical back: Neck supple.      Right lower leg: No edema.      Left lower leg: No edema.   Skin:     General: Skin is warm and dry.      Findings: No erythema or rash.   Neurological:      General: No focal deficit present.      Mental Status: He is alert and oriented to person, place, and time.      Cranial Nerves: No cranial nerve deficit.   Psychiatric:         Mood and Affect: Mood normal.         Behavior: Behavior normal.       Vitals:    02/11/25 1419 02/11/25 1421   BP: (!) 140/70 (!) 142/88   BP Location: Right arm Left arm   Patient Position: Sitting Sitting   Pulse: (!) 55    SpO2: 97%    Weight: 93.1 kg (205 lb 2.2 oz)      Lab Results   Component Value Date    CHOL 100 (L) 01/31/2025    CHOL 80 (L) 05/30/2023      Body mass index is 25.64 kg/m².   Lab Results   Component Value Date    HGBA1C 5.4 04/03/2024      BMP  Lab Results   Component Value Date     01/31/2025    K 4.7 01/31/2025     01/31/2025    CO2 25 01/31/2025     "BUN 28 (H) 01/31/2025    CREATININE 1.3 01/31/2025    CALCIUM 9.8 01/31/2025    ANIONGAP 10 01/31/2025    EGFRNORACEVR 59.8 (A) 01/31/2025      Lab Results   Component Value Date    HDL 28 (L) 01/31/2025    HDL 24 (L) 05/30/2023     Lab Results   Component Value Date    LDLCALC 58.0 (L) 01/31/2025    LDLCALC 43 05/30/2023     Lab Results   Component Value Date    TRIG 70 01/31/2025    TRIG 151 (H) 10/30/2023    TRIG 51 05/30/2023     Lab Results   Component Value Date    CHOLHDL 28.0 01/31/2025       Chemistry        Component Value Date/Time     01/31/2025 0736    K 4.7 01/31/2025 0736     01/31/2025 0736    CO2 25 01/31/2025 0736    BUN 28 (H) 01/31/2025 0736    CREATININE 1.3 01/31/2025 0736    GLU 96 01/31/2025 0736        Component Value Date/Time    CALCIUM 9.8 01/31/2025 0736    ALKPHOS 49 01/31/2025 0736    AST 31 01/31/2025 0736    ALT 55 (H) 01/31/2025 0736    BILITOT 0.8 01/31/2025 0736    ESTGFRAFRICA 59 04/03/2024 1236          No results found for: "TSH"  Lab Results   Component Value Date    INR 1.3 04/15/2024    INR 1.7 04/03/2024    INR 1.5 (H) 11/03/2023     Lab Results   Component Value Date    WBC 11.53 11/07/2023    HGB 8.0 (L) 11/07/2023    HCT 25.8 (L) 11/07/2023    MCV 94 11/07/2023     11/07/2023     BMP  Sodium   Date Value Ref Range Status   01/31/2025 142 136 - 145 mmol/L Final     Potassium   Date Value Ref Range Status   01/31/2025 4.7 3.5 - 5.1 mmol/L Final     Chloride   Date Value Ref Range Status   01/31/2025 107 95 - 110 mmol/L Final     CO2   Date Value Ref Range Status   01/31/2025 25 23 - 29 mmol/L Final     BUN   Date Value Ref Range Status   01/31/2025 28 (H) 8 - 23 mg/dL Final     Creatinine   Date Value Ref Range Status   01/31/2025 1.3 0.5 - 1.4 mg/dL Final     Calcium   Date Value Ref Range Status   01/31/2025 9.8 8.7 - 10.5 mg/dL Final     Anion Gap   Date Value Ref Range Status   01/31/2025 10 8 - 16 mmol/L Final     eGFR    Date Value " Ref Range Status   04/03/2024 59 mL/min/1.73mSq Final     Comment:     In accordance with NKF-ASN Task Force recommendation, calculation based on the Chronic Kidney Disease Epidemiology Collaboration (CKD-EPI) equation without adjustment for race. eGFR adjusted for gender and age and calculated in ml/min/1.73mSquared. eGFR cannot be calculated if patient is under 18 years of age.     Reference Range:   >= 60 ml/min/1.73mSquared.     CrCl cannot be calculated (Patient's most recent lab result is older than the maximum 7 days allowed.).    Assessment:     1. Coronary artery disease involving native coronary artery of native heart without angina pectoris    2. VT (ventricular tachycardia)    3. Primary hypertension    4. S/P CABG x 3    5. Old MI (myocardial infarction)    6. Ventricular fibrillation    7. Cardiac arrest      Htn control improved with   lisinopril t 20 mg po bid. Low salt diet advised.her needs better compliance  Hlp on statins tolerated well on target continue same.  Cad s/p cabg asymptomatic continue same he is off xarelto and plavix on asa only  He ahs excellent exercise tolerance back exercising will continue the same.   No signs of diabetes or glucose intolerance. Continue  same.  Plan:   Continue current therapy  Cardiac low salt diet.  Risk factor modification and excercise program.  Smoking cessation counseling  F/u in 6 months with lipid cmp ekg.

## 2025-03-15 NOTE — PLAN OF CARE
Bicarb given now IV   Pt AAOx3. A paced on monitor. BP stable. Afebrile. 2L O2 NC. Tolerating diet. Varela intact, all other lines removed. 2 PIVs intact. Pt turns/repositions independently. Assist x1 when ambulating. BG monitored. Sternal precautions reinforced. Bed low, wheels locked, alarms audible. POC reviewed with pt.

## 2025-06-30 ENCOUNTER — OFFICE VISIT (OUTPATIENT)
Dept: UROLOGY | Facility: CLINIC | Age: 70
End: 2025-06-30
Payer: MEDICARE

## 2025-06-30 VITALS
RESPIRATION RATE: 16 BRPM | HEIGHT: 75 IN | HEART RATE: 73 BPM | SYSTOLIC BLOOD PRESSURE: 130 MMHG | WEIGHT: 205.25 LBS | TEMPERATURE: 97 F | DIASTOLIC BLOOD PRESSURE: 79 MMHG | BODY MASS INDEX: 25.52 KG/M2

## 2025-06-30 DIAGNOSIS — N40.1 BENIGN PROSTATIC HYPERPLASIA WITH NOCTURIA: Primary | ICD-10-CM

## 2025-06-30 DIAGNOSIS — R35.1 BENIGN PROSTATIC HYPERPLASIA WITH NOCTURIA: Primary | ICD-10-CM

## 2025-06-30 DIAGNOSIS — Z80.42 FAMILY HISTORY OF PROSTATE CANCER IN FATHER: ICD-10-CM

## 2025-06-30 PROCEDURE — 1159F MED LIST DOCD IN RCRD: CPT | Mod: CPTII,S$GLB,, | Performed by: UROLOGY

## 2025-06-30 PROCEDURE — 1160F RVW MEDS BY RX/DR IN RCRD: CPT | Mod: CPTII,S$GLB,, | Performed by: UROLOGY

## 2025-06-30 PROCEDURE — 99214 OFFICE O/P EST MOD 30 MIN: CPT | Mod: S$GLB,,, | Performed by: UROLOGY

## 2025-06-30 PROCEDURE — 3078F DIAST BP <80 MM HG: CPT | Mod: CPTII,S$GLB,, | Performed by: UROLOGY

## 2025-06-30 PROCEDURE — 1157F ADVNC CARE PLAN IN RCRD: CPT | Mod: CPTII,S$GLB,, | Performed by: UROLOGY

## 2025-06-30 PROCEDURE — 1101F PT FALLS ASSESS-DOCD LE1/YR: CPT | Mod: CPTII,S$GLB,, | Performed by: UROLOGY

## 2025-06-30 PROCEDURE — 1126F AMNT PAIN NOTED NONE PRSNT: CPT | Mod: CPTII,S$GLB,, | Performed by: UROLOGY

## 2025-06-30 PROCEDURE — 3008F BODY MASS INDEX DOCD: CPT | Mod: CPTII,S$GLB,, | Performed by: UROLOGY

## 2025-06-30 PROCEDURE — 3075F SYST BP GE 130 - 139MM HG: CPT | Mod: CPTII,S$GLB,, | Performed by: UROLOGY

## 2025-06-30 PROCEDURE — 99999 PR PBB SHADOW E&M-EST. PATIENT-LVL IV: CPT | Mod: PBBFAC,,, | Performed by: UROLOGY

## 2025-06-30 PROCEDURE — 4010F ACE/ARB THERAPY RXD/TAKEN: CPT | Mod: CPTII,S$GLB,, | Performed by: UROLOGY

## 2025-06-30 PROCEDURE — 3288F FALL RISK ASSESSMENT DOCD: CPT | Mod: CPTII,S$GLB,, | Performed by: UROLOGY

## 2025-06-30 RX ORDER — TAMSULOSIN HYDROCHLORIDE 0.4 MG/1
0.4 CAPSULE ORAL DAILY
Qty: 30 CAPSULE | Refills: 3 | Status: SHIPPED | OUTPATIENT
Start: 2025-06-30 | End: 2025-10-28

## 2025-06-30 NOTE — PROGRESS NOTES
Chief Complaint:   Encounter Diagnoses   Name Primary?    Family history of prostate cancer in father     Benign prostatic hyperplasia with nocturia Yes       HPI:  HPI Carlin Isaac annamaria 70 y.o. male who presents with complaints of urinary frequency, nocturia x5, hesitancy.  He states it has been going on for the last year so.  He has never been on BPH treatment in the past.  He is on testosterone replacement he takes 120 mg 3 times a week.  His last 5 levels in the chart have been greater than a 1000.  He has a history of CABG a couple years ago.  He does have family history of prostate cancer and gets routine PSA screens.    History:  Social History[1]  Past Medical History:   Diagnosis Date    Kidney stone      Past Surgical History:   Procedure Laterality Date    CORONARY ARTERY BYPASS GRAFT (CABG) N/A 11/2/2023    Procedure: CORONARY ARTERY BYPASS GRAFT (CABG);  Surgeon: Oscar Cazares MD;  Location: [facility] OR;  Service: Cardiothoracic;  Laterality: N/A;  3-VESSEL WITH EPI-AORTIC ULTRASOUND    ECHOCARDIOGRAM,TRANSESOPHAGEAL N/A 11/2/2023    Procedure: ECHOCARDIOGRAM,TRANSESOPHAGEAL;  Surgeon: Oscar Cazares MD;  Location: [facility] OR;  Service: Cardiothoracic;  Laterality: N/A;    ENDOSCOPIC HARVEST OF VEIN Left 11/2/2023    Procedure: SURGICAL PROCUREMENT, VEIN, ENDOSCOPIC;  Surgeon: Oscar Cazares MD;  Location: [facility] OR;  Service: Cardiothoracic;  Laterality: Left;    INJECTION OF ANESTHETIC AGENT AROUND MULTIPLE INTERCOSTAL NERVES N/A 11/2/2023    Procedure: BLOCK, NERVE, INTERCOSTAL, 2 OR MORE;  Surgeon: Oscar Cazares MD;  Location: [facility] OR;  Service: Cardiothoracic;  Laterality: N/A;  PARA-STERNALNERVE BLOCK    INSERTION OF INTRAVASCULAR MICROAXIAL BLOOD PUMP N/A 10/31/2023    Procedure: INSERTION, IMPELLA;  Surgeon: Baldev Mathew MD;  Location: [facility] CATH LAB;  Service: Cardiology;  Laterality: N/A;    LEFT HEART CATHETERIZATION Left 10/31/2023    Procedure: Left heart cath, with possible  "Impella;  Surgeon: Baldev Mathew MD;  Location: [facility] CATH LAB;  Service: Cardiology;  Laterality: Left;     Family History   Problem Relation Name Age of Onset    Prostate cancer Father         Medications Ordered Prior to Encounter[2]     Objective:     Vitals:    06/30/25 1447   BP: 130/79   Pulse: 73   Resp: 16   Temp: 97.4 °F (36.3 °C)   Weight: 93.1 kg (205 lb 4 oz)   Height: 6' 3" (1.905 m)      BMI Readings from Last 1 Encounters:   06/30/25 25.65 kg/m²          Physical Exam  No acute distress alert and oriented   Respirations even unlabored   Abdomen is soft nontender  Lab Results   Component Value Date    PSA 0.92 04/17/2025    PSA 0.51 07/01/2024    PSA 0.61 04/08/2024    PSA 0.51 02/12/2024    PSA 0.95 06/29/2023    PSA 0.74 04/14/2023    PSA 1.03 11/10/2022    PSA 0.85 08/18/2022    PSA 0.69 03/18/2022    PSA 0.40 09/17/2021    PSA 0.5 04/16/2021    PSA 0.6 11/13/2020    PSA 0.4 09/14/2020    PSA 0.5 08/19/2020        Lab Results   Component Value Date    CREATININE 1.3 01/31/2025      Assessment:       1. Benign prostatic hyperplasia with nocturia    2. Family history of prostate cancer in father        Plan:     1. Benign prostatic hyperplasia with nocturia    2. Family history of prostate cancer in father       Orders Placed This Encounter    tamsulosin (FLOMAX) 0.4 mg Cap      BPH with moderate lower urinary tract symptoms.  We will try him on tamsulosin.  He inquired about a UroLift.  Discussed his testosterone levels are too high for his age.  Discussed risks of BPH, prostate cancer, increase coagulability and heart attack strokes blood clots.  We will see him back in about 10 weeks to evaluate his luts.        [1]   Social History  Tobacco Use    Smoking status: Never    Smokeless tobacco: Never   Substance Use Topics    Alcohol use: Never    Drug use: Never   [2]   Current Outpatient Medications on File Prior to Visit   Medication Sig Dispense Refill    anastrozole (ARIMIDEX) 1 mg Tab Take 1 " mg by mouth 3 (three) times a week.      aspirin (ECOTRIN) 81 MG EC tablet Take 1 tablet (81 mg total) by mouth once daily. 90 tablet 3    cholecalciferol, vitD3,/vit K2 (VITAMIN D3-VITAMIN K2 ORAL) Take 1 capsule by mouth every morning.      ezetimibe (ZETIA) 10 mg tablet Take 1 tablet by mouth every morning.      ibuprofen (ADVIL,MOTRIN) 800 MG tablet Take 800 mg by mouth 3 (three) times daily as needed.      lisinopriL (PRINIVIL,ZESTRIL) 20 MG tablet Take 1 tablet (20 mg total) by mouth 2 (two) times a day. 60 tablet 6    magnesium oxide (MAG-OX) 400 mg (241.3 mg magnesium) tablet Take 1 tablet by mouth every morning.      metoprolol succinate (TOPROL-XL) 50 MG 24 hr tablet Take 1 tablet by mouth every morning.      omega-3 fatty acids/fish oil (FISH OIL-OMEGA-3 FATTY ACIDS) 300-1,000 mg capsule Take 1 capsule by mouth every morning.      oxyCODONE-acetaminophen (PERCOCET)  mg per tablet Take 1 tablet by mouth every 6 (six) hours as needed.      pregabalin (LYRICA) 50 MG capsule Take 50 mg by mouth 2 (two) times daily.      rosuvastatin (CRESTOR) 20 MG tablet Take 1 tablet by mouth every morning.      terbinafine HCL (LAMISIL) 250 mg tablet Take 250 mg by mouth.      testosterone cypionate (DEPOTESTOTERONE CYPIONATE) 200 mg/mL injection Inject 200 mg into the muscle every 7 days.      [DISCONTINUED] atorvastatin (LIPITOR) 80 MG tablet Take 1 tablet (80 mg total) by mouth once daily. 90 tablet 3    [DISCONTINUED] clopidogreL (PLAVIX) 75 mg tablet Take 1 tablet (75 mg total) by mouth once daily. 90 tablet 3    [DISCONTINUED] folic acid (FOLVITE) 1 MG tablet Take 1 tablet (1 mg total) by mouth once daily. (Patient not taking: Reported on 2/11/2025) 30 tablet 0    [DISCONTINUED] furosemide (LASIX) 40 MG tablet Take 1 tablet (40 mg total) by mouth 2 (two) times daily. for 7 days 14 tablet 0    [DISCONTINUED] pantoprazole (PROTONIX) 40 MG tablet Take 1 tablet (40 mg total) by mouth once daily. 90 tablet 0     [DISCONTINUED] rivaroxaban (XARELTO) 2.5 mg Tab Take 1 tablet (2.5 mg total) by mouth 2 (two) times daily with meals. 180 tablet 3     No current facility-administered medications on file prior to visit.

## 2025-08-05 ENCOUNTER — PATIENT MESSAGE (OUTPATIENT)
Dept: CARDIOLOGY | Facility: CLINIC | Age: 70
End: 2025-08-05
Payer: MEDICARE

## 2025-08-06 ENCOUNTER — TELEPHONE (OUTPATIENT)
Dept: CARDIOLOGY | Facility: CLINIC | Age: 70
End: 2025-08-06
Payer: MEDICARE

## 2025-08-06 NOTE — TELEPHONE ENCOUNTER
Copied from CRM #[id number]. Topic: General Inquiry - Patient Advice  >> Aug 6, 2025 12:57 PM Mayi wrote:  Type: Staff Message     Who called: marc - surgical specialty   Call back number: [phone]  Reason for the call: returning call for cardiac clearance  Additional information: fax [phone]      Clearance faxed with confirmation

## 2025-08-06 NOTE — TELEPHONE ENCOUNTER
Dr. Satya Muller  [phone]  Coordinator: Jane Quinones, [phone]  Pre-Admit Clinic Nurse, Anna Bauman [phone]    Called all numbers and LM for them to call us back and give us a fax number so we can send the cardiac clearance from Baldev Quiles MD to Me        8/6/25  7:02 AM  Moderate risk   No cardiac contraindication  Stop asa -7 days before procedure

## 2025-08-20 ENCOUNTER — LAB VISIT (OUTPATIENT)
Dept: LAB | Facility: HOSPITAL | Age: 70
End: 2025-08-20
Attending: INTERNAL MEDICINE
Payer: MEDICARE

## 2025-08-20 DIAGNOSIS — I25.2 OLD MI (MYOCARDIAL INFARCTION): ICD-10-CM

## 2025-08-20 DIAGNOSIS — I25.10 CORONARY ARTERY DISEASE INVOLVING NATIVE CORONARY ARTERY OF NATIVE HEART WITHOUT ANGINA PECTORIS: ICD-10-CM

## 2025-08-20 LAB
ALBUMIN SERPL BCP-MCNC: 3.9 G/DL (ref 3.5–5.2)
ALP SERPL-CCNC: 49 UNIT/L (ref 40–150)
ALT SERPL W/O P-5'-P-CCNC: 39 UNIT/L (ref 0–55)
ANION GAP (OHS): 10 MMOL/L (ref 8–16)
AST SERPL-CCNC: 26 UNIT/L (ref 0–50)
BILIRUB SERPL-MCNC: 1 MG/DL (ref 0.1–1)
BUN SERPL-MCNC: 29 MG/DL (ref 8–23)
CALCIUM SERPL-MCNC: 9.2 MG/DL (ref 8.7–10.5)
CHLORIDE SERPL-SCNC: 103 MMOL/L (ref 95–110)
CHOLEST SERPL-MCNC: 110 MG/DL (ref 120–199)
CHOLEST/HDLC SERPL: 3.4 {RATIO} (ref 2–5)
CO2 SERPL-SCNC: 28 MMOL/L (ref 23–29)
CREAT SERPL-MCNC: 1.3 MG/DL (ref 0.5–1.4)
GFR SERPLBLD CREATININE-BSD FMLA CKD-EPI: 59 ML/MIN/1.73/M2
GLUCOSE SERPL-MCNC: 90 MG/DL (ref 70–110)
HDLC SERPL-MCNC: 32 MG/DL (ref 40–75)
HDLC SERPL: 29.1 % (ref 20–50)
LDLC SERPL CALC-MCNC: 62.2 MG/DL (ref 63–159)
NONHDLC SERPL-MCNC: 78 MG/DL
POTASSIUM SERPL-SCNC: 4.4 MMOL/L (ref 3.5–5.1)
PROT SERPL-MCNC: 6.4 GM/DL (ref 6–8.4)
SODIUM SERPL-SCNC: 141 MMOL/L (ref 136–145)
TRIGL SERPL-MCNC: 79 MG/DL (ref 30–150)

## 2025-08-20 PROCEDURE — 80053 COMPREHEN METABOLIC PANEL: CPT

## 2025-08-20 PROCEDURE — 36415 COLL VENOUS BLD VENIPUNCTURE: CPT | Mod: PO

## 2025-08-20 PROCEDURE — 80061 LIPID PANEL: CPT

## 2025-09-04 ENCOUNTER — OFFICE VISIT (OUTPATIENT)
Dept: CARDIOLOGY | Facility: CLINIC | Age: 70
End: 2025-09-04
Payer: MEDICARE

## 2025-09-04 VITALS
BODY MASS INDEX: 25.95 KG/M2 | WEIGHT: 208.69 LBS | OXYGEN SATURATION: 96 % | DIASTOLIC BLOOD PRESSURE: 58 MMHG | SYSTOLIC BLOOD PRESSURE: 132 MMHG | HEIGHT: 75 IN | HEART RATE: 56 BPM

## 2025-09-04 DIAGNOSIS — I25.10 CORONARY ARTERY DISEASE INVOLVING NATIVE CORONARY ARTERY OF NATIVE HEART WITHOUT ANGINA PECTORIS: Primary | ICD-10-CM

## 2025-09-04 DIAGNOSIS — I10 PRIMARY HYPERTENSION: ICD-10-CM

## 2025-09-04 DIAGNOSIS — Z95.1 S/P CABG X 3: ICD-10-CM

## 2025-09-04 DIAGNOSIS — I25.2 OLD MI (MYOCARDIAL INFARCTION): ICD-10-CM

## 2025-09-04 DIAGNOSIS — E78.5 HYPERLIPIDEMIA, UNSPECIFIED HYPERLIPIDEMIA TYPE: ICD-10-CM

## 2025-09-04 DIAGNOSIS — R79.9 ABNORMAL FINDING OF BLOOD CHEMISTRY, UNSPECIFIED: ICD-10-CM

## 2025-09-04 DIAGNOSIS — I47.20 VT (VENTRICULAR TACHYCARDIA): ICD-10-CM

## 2025-09-04 DIAGNOSIS — I49.01 VENTRICULAR FIBRILLATION: ICD-10-CM

## 2025-09-04 DIAGNOSIS — I46.9 CARDIAC ARREST: ICD-10-CM

## 2025-09-04 PROCEDURE — 3044F HG A1C LEVEL LT 7.0%: CPT | Mod: CPTII,S$GLB,, | Performed by: INTERNAL MEDICINE

## 2025-09-04 PROCEDURE — 3288F FALL RISK ASSESSMENT DOCD: CPT | Mod: CPTII,S$GLB,, | Performed by: INTERNAL MEDICINE

## 2025-09-04 PROCEDURE — 99213 OFFICE O/P EST LOW 20 MIN: CPT | Mod: S$GLB,,, | Performed by: INTERNAL MEDICINE

## 2025-09-04 PROCEDURE — 1160F RVW MEDS BY RX/DR IN RCRD: CPT | Mod: CPTII,S$GLB,, | Performed by: INTERNAL MEDICINE

## 2025-09-04 PROCEDURE — 1101F PT FALLS ASSESS-DOCD LE1/YR: CPT | Mod: CPTII,S$GLB,, | Performed by: INTERNAL MEDICINE

## 2025-09-04 PROCEDURE — 3075F SYST BP GE 130 - 139MM HG: CPT | Mod: CPTII,S$GLB,, | Performed by: INTERNAL MEDICINE

## 2025-09-04 PROCEDURE — 3078F DIAST BP <80 MM HG: CPT | Mod: CPTII,S$GLB,, | Performed by: INTERNAL MEDICINE

## 2025-09-04 PROCEDURE — 1157F ADVNC CARE PLAN IN RCRD: CPT | Mod: CPTII,S$GLB,, | Performed by: INTERNAL MEDICINE

## 2025-09-04 PROCEDURE — 99999 PR PBB SHADOW E&M-EST. PATIENT-LVL IV: CPT | Mod: PBBFAC,,, | Performed by: INTERNAL MEDICINE

## 2025-09-04 PROCEDURE — 1126F AMNT PAIN NOTED NONE PRSNT: CPT | Mod: CPTII,S$GLB,, | Performed by: INTERNAL MEDICINE

## 2025-09-04 PROCEDURE — 4010F ACE/ARB THERAPY RXD/TAKEN: CPT | Mod: CPTII,S$GLB,, | Performed by: INTERNAL MEDICINE

## 2025-09-04 PROCEDURE — 3008F BODY MASS INDEX DOCD: CPT | Mod: CPTII,S$GLB,, | Performed by: INTERNAL MEDICINE

## 2025-09-04 PROCEDURE — 1159F MED LIST DOCD IN RCRD: CPT | Mod: CPTII,S$GLB,, | Performed by: INTERNAL MEDICINE

## (undated) DEVICE — SUT PROLENE 6-0 C-1 30IN BL

## (undated) DEVICE — SYR 30CC LUER LOCK

## (undated) DEVICE — SUT VICRYL 1 OB 36 CTX

## (undated) DEVICE — DRESSING MEPORE 3.6 X 10

## (undated) DEVICE — SOL ISOLYTE S PH 7.4 1000ML

## (undated) DEVICE — BLADE KNIFE UNITOME 4.0MM

## (undated) DEVICE — CANNULA AG CARDPLG 14G FLANGE

## (undated) DEVICE — KIT SYR REUSABLE

## (undated) DEVICE — APPLIER LIGACLIP SM 9.38IN

## (undated) DEVICE — CANNULA 21FR 7MM SOFT FLOW EXT

## (undated) DEVICE — KIT SURGIFLO HEMOSTATIC MATRIX

## (undated) DEVICE — COVER MAYO STND XL 30X57IN

## (undated) DEVICE — CONNECTOR 6 IN 1 Y TUBING STRL

## (undated) DEVICE — SUT SILK 2-0 SH 18IN BLACK

## (undated) DEVICE — CONNECTOR Y 3/8X3/8X1/2 STRL

## (undated) DEVICE — CANNULA PERF RCSP 15FR SINUS

## (undated) DEVICE — SUT 8/0 24IN PROLENE BL MON

## (undated) DEVICE — CONNECTOR STRAIGHT 1/4X1/4IN

## (undated) DEVICE — ANGIOTOUCH KIT

## (undated) DEVICE — GLOVE BIOGEL 7.5

## (undated) DEVICE — CATH IMPULSE 6FR MULTI-PAK

## (undated) DEVICE — SUT SILK 1 6-30 LABYRINTH

## (undated) DEVICE — SET SUCTION ANTICOAGULANT

## (undated) DEVICE — SET DECANTER MEDICHOICE

## (undated) DEVICE — CLIP STEALTH LATIS 1/4 FORCE 6

## (undated) DEVICE — CATH DIAG IMPULSE 6FR FL4

## (undated) DEVICE — PACK OPEN HEART BR

## (undated) DEVICE — CATH URETHRAL RED RUBBER 18FR

## (undated) DEVICE — CANNULA IMA 1MM

## (undated) DEVICE — NDL ECLIPSE SAFETY 23G 1.5IN

## (undated) DEVICE — GOWN NONREINF SET-IN SLV 2XL

## (undated) DEVICE — DRESSING MEPORE ISLAND 31/2X4

## (undated) DEVICE — SPONGE COTTON TRAY 4X4IN

## (undated) DEVICE — TAPE SILK 3IN

## (undated) DEVICE — KIT PREVENA PLUS

## (undated) DEVICE — APPLIER CLIP LIAGCLIP 9.375IN

## (undated) DEVICE — CABLE PACING WIRE EPICARD 12FT

## (undated) DEVICE — CONNECTOR 3/8X3/8 STERILE

## (undated) DEVICE — SPONGE LAP 18X18 PREWASHED

## (undated) DEVICE — SET PNEUMOCLEAR HEAT HUM SE HF

## (undated) DEVICE — RESERVOIR CARDIOTOMY.

## (undated) DEVICE — PERFUSION FX X-COATED

## (undated) DEVICE — SUT MCRYL PLUS 4-0 PS2 27IN

## (undated) DEVICE — Device

## (undated) DEVICE — PACK SPY-PHI DRUG DRAPE

## (undated) DEVICE — SOL NACL IRR 1000ML BTL

## (undated) DEVICE — KIT PROBE COVER WITH GEL

## (undated) DEVICE — SOL IRRI STRL WATER 1000ML

## (undated) DEVICE — CATH IMPELLA CP 14F 4.7X65MM

## (undated) DEVICE — BOWL CELL SAVER 5 225ML

## (undated) DEVICE — SUT PROLENE 4-0 RB-1 BL MO

## (undated) DEVICE — SUT 7/0 24IN PROLENE BL MO

## (undated) DEVICE — CATH DIAG IMPULSE 6FR FR4

## (undated) DEVICE — ELECTRODE REM PLYHSV RETURN 9

## (undated) DEVICE — SHEATH INTRODUCER 6FR 11CM

## (undated) DEVICE — SOL NORMAL USPCA 0.9%

## (undated) DEVICE — BLOWER MISTER

## (undated) DEVICE — SET CARDIOPLEGIA DEL

## (undated) DEVICE — OMNIPAQUE 300MG 150ML VIAL

## (undated) DEVICE — WIRE TEMP PACING 0 SH SKS-3

## (undated) DEVICE — NDL PERCUTANEOUS 21G 7CM VASC

## (undated) DEVICE — BANDAGE ROLL COTTN 4.5INX4.1YD

## (undated) DEVICE — TOWEL OR DISP STRL BLUE 4/PK

## (undated) DEVICE — CANNULA SOFT FLOW EXT 24F 8MM

## (undated) DEVICE — SENSORS CDI

## (undated) DEVICE — GOWN POLY REINF BRTH SLV XL

## (undated) DEVICE — LOCATOR MANTA DEPTH 8FR

## (undated) DEVICE — CONTAINER SPECIMEN OR STER 4OZ

## (undated) DEVICE — VENTILATOR TRANSPORT CIRCUIT

## (undated) DEVICE — BLANKET HYPOTHERMIA 25X64IN

## (undated) DEVICE — ELECTRODE BLADE E-Z CLEAN 4IN

## (undated) DEVICE — POWDER ARISTA AH 1GM

## (undated) DEVICE — CANNULA VENOUS MC2X 29FR

## (undated) DEVICE — GOWN POLY REINF X-LONG XL

## (undated) DEVICE — TIP YANKAUERS BULB NO VENT

## (undated) DEVICE — SUT STEEL 7 MONO B&S18 CCS

## (undated) DEVICE — DRESSING MEPORE ADH 3.5X12

## (undated) DEVICE — SUT PERMA HAND SILK BLK 0-0

## (undated) DEVICE — PACK HEART CATH BR

## (undated) DEVICE — SUT PROLENE 4-0 SH BLU 36IN

## (undated) DEVICE — EVACUATOR WOUND BULB 100CC

## (undated) DEVICE — INSERT STEALTH SURGICAL CLAMP

## (undated) DEVICE — GUIDEWIRE EMERALD .035IN 260CM

## (undated) DEVICE — SYS VIRTUOSAPH PLUS EVM

## (undated) DEVICE — IMMOBILIZER KNEE 22IN

## (undated) DEVICE — CATH IMPULSE PIGTAIL 6F 110CM

## (undated) DEVICE — DRAPE THREE-QUARTER 53X77IN

## (undated) DEVICE — TRAY CATH FOL SIL TEMP 10 16FR

## (undated) DEVICE — ORGNZR TUBING CLR W/CLIPS

## (undated) DEVICE — SYR 20ML BLUE LUER LOCK

## (undated) DEVICE — ADAPTER DLP Y PERF 3.5 &10IN

## (undated) DEVICE — RETRACTOR OCTOBASE INSERT HOLD

## (undated) DEVICE — CANNULA VSL W/DUCKBILL

## (undated) DEVICE — COUNTER BDL BLADEGUARD LI DBL

## (undated) DEVICE — GLOVE BIOGEL PI MICRO SZ 6

## (undated) DEVICE — DRAIN CHAN RND HUBLS 8MM 24FR

## (undated) DEVICE — SET PERFUSION

## (undated) DEVICE — TUBING CONNECTION 5MMX 15 18IN

## (undated) DEVICE — CELL SAVER 4/CASE

## (undated) DEVICE — PUNCH AORTIC SHORT 4.4MM

## (undated) DEVICE — SUT PLEDGET LG SOFT

## (undated) DEVICE — COVER OVERHEAD SURG LT BLUE

## (undated) DEVICE — BANDAGE ACE DOUBLE STER 6IN

## (undated) DEVICE — DRAIN CHANNEL ROUND 19FR

## (undated) DEVICE — DRESSING ANTIMICROBIAL 1 INCH

## (undated) DEVICE — TUBING MEDI-VAC 20FT .25IN

## (undated) DEVICE — DRAIN CHEST DRY SUCTION

## (undated) DEVICE — SUT SILK 2-0 STRANDS 30IN

## (undated) DEVICE — CATH THORACIC ANGLE 24F

## (undated) DEVICE — KIT MANIFOLD LOW PRESS TUBING

## (undated) DEVICE — DRAPE SLUSH WARMER WITH DISC

## (undated) DEVICE — POWDER ARISTA AH 3G

## (undated) DEVICE — SUT VICRYL 2-0 36 CT-1